# Patient Record
Sex: MALE | Race: BLACK OR AFRICAN AMERICAN | NOT HISPANIC OR LATINO | Employment: OTHER | URBAN - METROPOLITAN AREA
[De-identification: names, ages, dates, MRNs, and addresses within clinical notes are randomized per-mention and may not be internally consistent; named-entity substitution may affect disease eponyms.]

---

## 2017-05-10 ENCOUNTER — HOSPITAL ENCOUNTER (OUTPATIENT)
Dept: MRI IMAGING | Facility: CLINIC | Age: 67
Discharge: HOME | End: 2017-05-10
Attending: INTERNAL MEDICINE | Admitting: INTERNAL MEDICINE
Payer: COMMERCIAL

## 2017-05-10 DIAGNOSIS — G89.29 CHRONIC BACK PAIN: ICD-10-CM

## 2017-05-10 DIAGNOSIS — R29.898 WEAKNESS OF BOTH LEGS: ICD-10-CM

## 2017-05-10 DIAGNOSIS — N18.5 CHRONIC KIDNEY DISEASE, STAGE V (H): ICD-10-CM

## 2017-05-10 DIAGNOSIS — E11.22 TYPE 2 DIABETES MELLITUS WITH DIABETIC CHRONIC KIDNEY DISEASE (H): ICD-10-CM

## 2017-05-10 DIAGNOSIS — M54.9 CHRONIC BACK PAIN: ICD-10-CM

## 2017-05-10 DIAGNOSIS — I10 ESSENTIAL (PRIMARY) HYPERTENSION: ICD-10-CM

## 2017-05-10 PROCEDURE — 72148 MRI LUMBAR SPINE W/O DYE: CPT

## 2017-08-17 ENCOUNTER — MEDICAL CORRESPONDENCE (OUTPATIENT)
Dept: HEALTH INFORMATION MANAGEMENT | Facility: CLINIC | Age: 67
End: 2017-08-17

## 2017-08-17 ENCOUNTER — TRANSFERRED RECORDS (OUTPATIENT)
Dept: HEALTH INFORMATION MANAGEMENT | Facility: CLINIC | Age: 67
End: 2017-08-17

## 2017-11-03 ENCOUNTER — MEDICAL CORRESPONDENCE (OUTPATIENT)
Dept: HEALTH INFORMATION MANAGEMENT | Facility: CLINIC | Age: 67
End: 2017-11-03

## 2018-04-20 ENCOUNTER — HOSPITAL ENCOUNTER (OUTPATIENT)
Dept: ULTRASOUND IMAGING | Facility: CLINIC | Age: 68
Discharge: HOME | End: 2018-04-20
Attending: INTERNAL MEDICINE | Admitting: INTERNAL MEDICINE
Payer: COMMERCIAL

## 2018-04-20 DIAGNOSIS — N18.4 CHRONIC KIDNEY DISEASE, STAGE 4 (SEVERE) (H): ICD-10-CM

## 2018-04-20 PROCEDURE — 76770 US EXAM ABDO BACK WALL COMP: CPT

## 2019-01-07 ENCOUNTER — OFFICE VISIT (OUTPATIENT)
Dept: OPHTHALMOLOGY | Facility: CLINIC | Age: 69
End: 2019-01-07
Attending: OPTOMETRIST
Payer: COMMERCIAL

## 2019-01-07 DIAGNOSIS — H25.812 COMBINED FORMS OF AGE-RELATED CATARACT OF LEFT EYE: ICD-10-CM

## 2019-01-07 DIAGNOSIS — H40.003 GLAUCOMA SUSPECT, BILATERAL: ICD-10-CM

## 2019-01-07 DIAGNOSIS — E08.3213 MILD NONPROLIFERATIVE DIABETIC RETINOPATHY OF BOTH EYES WITH MACULAR EDEMA ASSOCIATED WITH DIABETES MELLITUS DUE TO UNDERLYING CONDITION (H): Primary | ICD-10-CM

## 2019-01-07 PROCEDURE — G0463 HOSPITAL OUTPT CLINIC VISIT: HCPCS | Mod: 25,ZF

## 2019-01-07 PROCEDURE — 92134 CPTRZ OPH DX IMG PST SGM RTA: CPT | Mod: ZF | Performed by: OPTOMETRIST

## 2019-01-07 ASSESSMENT — VISUAL ACUITY
OS_SC: 20/200
OS_PH_SC: 20/100
OD_PH_SC+: -2
METHOD: SNELLEN - LINEAR
OD_SC+: +2
OD_SC: 20/40
OS_PH_SC+: -1
OD_PH_SC: 20/30

## 2019-01-07 ASSESSMENT — CUP TO DISC RATIO
OS_RATIO: 0.6
OD_RATIO: 0.6

## 2019-01-07 ASSESSMENT — REFRACTION_MANIFEST
OD_AXIS: 020
OS_ADD: +3.00
OD_SPHERE: -0.50
OD_ADD: +3.00
OS_CYLINDER: +0.50
OS_SPHERE: -1.00
OS_AXIS: 180
OD_CYLINDER: +1.50

## 2019-01-07 ASSESSMENT — CONF VISUAL FIELD
OS_NORMAL: 1
METHOD: COUNTING FINGERS
OD_NORMAL: 1

## 2019-01-07 ASSESSMENT — SLIT LAMP EXAM - LIDS
COMMENTS: NORMAL
COMMENTS: NORMAL

## 2019-01-07 ASSESSMENT — EXTERNAL EXAM - LEFT EYE: OS_EXAM: NORMAL

## 2019-01-07 ASSESSMENT — TONOMETRY
IOP_METHOD: TONOPEN
OD_IOP_MMHG: 18
OS_IOP_MMHG: 17

## 2019-01-07 ASSESSMENT — REFRACTION_WEARINGRX: SPECS_TYPE: PAL

## 2019-01-07 ASSESSMENT — EXTERNAL EXAM - RIGHT EYE: OD_EXAM: NORMAL

## 2019-01-07 NOTE — PROGRESS NOTES
HPI:  Patient presents for a diabetic eye exam. He has diabetes mellitis type 2 (30 years) and on insulin (stable per last primary note 08/17/2017. Patient complains of glare and blur in the left eye. He has noticed the blur for the last three months now.       Pertinent Medical History:    Diabetes mellitis type 2    Hypertension    CKD 5    Ocular History:    PCO right eye    Cataract, left eye     No family history of glaucoma    Eye Medications:    None    Assessment and Plan:  1.   Mild Non-Proliferative Diabetic Retinopathy both eyes. With CME right eye >> left eye.     Patient has had diabetes for ~30 years. CME is visually significant. See retina clinic within one week for possible injections.     2.   Cataract, left eye.     Visually significant. Hold off until resolution of CME. Patient usually follows Dr. Vazquez.     3.   PCO, right eye.     Mildly visually significant. Follow up with Dr. Vazquez.     4.   Low Risk Glaucoma suspect due to age and cupping, both eyes.     Rim tissue full, large ONH, and no family history of glaucoma.    Recommends baseline testing to include VF 24-2, ONH OCT, and fundus photo in 6 months with Dr. Vazquez.       Medical History:  Past Medical History:   Diagnosis Date     Anal fissure      Kidney problem     causes leg swelling, underfunctioning     Non-proliferative diabetic retinopathy, both eyes (H) 9/16/2015     Nonsenile cataract      Nonspecific reaction to tuberculin skin test without active tuberculosis     finished 9 months INH 7/03-4/04     Type II or unspecified type diabetes mellitus without mention of complication, not stated as uncontrolled      Unspecified essential hypertension        Medications:  Current Outpatient Medications   Medication Sig Dispense Refill     amLODIPine (NORVASC) 5 MG tablet Take 5 mg by mouth daily.       ASPIRIN 81 MG OR TABS ONE DAILY 100 3     chlorthalidone (HYGROTEN) 25 MG tablet Take 25 mg by mouth daily.       Cholecalciferol  (VITAMIN D) 2000 UNIT CAPS Take 1 capsule by mouth daily.       glimepiride (AMARYL) 2 MG tablet Take 2 mg by mouth 2 times daily.       linagliptin (TRADJENTA) 5 MG TABS tablet Take 5 mg by mouth daily.       liraglutide (VICTOZA) 18 MG/3ML SOLN Inject  Subcutaneous daily. 0.6mg sq daily once weekly, then 1.2 sq daily       losartan (COZAAR) 50 MG tablet Take 50 mg by mouth daily.       metoprolol (TOPROL-XL) 50 MG 24 hr tablet Take 50 mg by mouth daily. For 90 days     Complete documentation of historical and exam elements from today's encounter can be found in the full encounter summary report (not reduplicated in this progress note). I personally obtained the chief complaint(s) and history of present illness.  I confirmed and edited as necessary the review of systems, past medical/surgical history, family history, social history, and examination findings as documented by others; and I examined the patient myself. I personally reviewed the relevant tests, images, and reports as documented above. I formulated and edited as necessary the assessment and plan and discussed the findings and management plan with the patient and family. - Sarthak Angeles, right eye    Chief Complaint(s) and History of Present Illness(es)     Diabetic Eye Exam     Associated symptoms include blurred vision.  Diabetes characteristics include Type 2.              Comments        The  blurry vision is in his LE. Hes has a history of cataract surgery RE 2012 but not the left.  He also complains of eye dryness LE and pressure LE.     Last A1C was 7.0 IN dECEMBER  BS today was 180    Gonzalo Teran COT 8:43 AM January 7, 2019

## 2019-02-20 ENCOUNTER — OFFICE VISIT (OUTPATIENT)
Dept: OPHTHALMOLOGY | Facility: CLINIC | Age: 69
End: 2019-02-20
Attending: OPHTHALMOLOGY
Payer: COMMERCIAL

## 2019-02-20 DIAGNOSIS — E10.3213 BOTH EYES AFFECTED BY MILD NONPROLIFERATIVE DIABETIC RETINOPATHY WITH MACULAR EDEMA, ASSOCIATED WITH TYPE 1 DIABETES MELLITUS (H): Primary | ICD-10-CM

## 2019-02-20 DIAGNOSIS — H25.12 AGE-RELATED NUCLEAR CATARACT, LEFT: Primary | ICD-10-CM

## 2019-02-20 DIAGNOSIS — E11.3293 NON-PROLIFERATIVE DIABETIC RETINOPATHY, BOTH EYES (H): ICD-10-CM

## 2019-02-20 PROCEDURE — 76516 ECHO EXAM OF EYE: CPT | Mod: ZF | Performed by: OPHTHALMOLOGY

## 2019-02-20 PROCEDURE — G0463 HOSPITAL OUTPT CLINIC VISIT: HCPCS | Mod: ZF

## 2019-02-20 ASSESSMENT — VISUAL ACUITY
METHOD: SNELLEN - LINEAR
OS_PH_SC: 20/100
OS_PH_SC: 20/100-1
OS_SC: 20/125
METHOD: SNELLEN - LINEAR
OD_SC+: -1
OS_SC: 20/125
OD_SC: 20/25
OS_PH_SC+: -1
OD_SC: 20/25-1

## 2019-02-20 ASSESSMENT — REFRACTION_WEARINGRX
OS_CYLINDER: +0.50
OD_ADD: +3.00
OD_AXIS: 020
SPECS_TYPE: PAL
OS_ADD: +3.00
OD_ADD: +3.00
OD_AXIS: 020
SPECS_TYPE: PAL
OD_CYLINDER: +1.50
OS_AXIS: 180
OD_SPHERE: -0.50
OS_SPHERE: -1.00
OS_AXIS: 180
OD_CYLINDER: +1.50
SPECS_TYPE: PAL
OS_ADD: +3.00
OS_SPHERE: -1.00
OS_CYLINDER: +0.50
OD_SPHERE: -0.50

## 2019-02-20 ASSESSMENT — SLIT LAMP EXAM - LIDS
COMMENTS: NORMAL

## 2019-02-20 ASSESSMENT — TONOMETRY
OS_IOP_MMHG: 22
OS_IOP_MMHG: 22
OD_IOP_MMHG: 18
IOP_METHOD: TONOPEN
IOP_METHOD: TONOPEN
OD_IOP_MMHG: 18

## 2019-02-20 ASSESSMENT — CONF VISUAL FIELD
METHOD: COUNTING FINGERS
OS_NORMAL: 1
OD_NORMAL: 1
OS_NORMAL: 1
OD_NORMAL: 1

## 2019-02-20 ASSESSMENT — EXTERNAL EXAM - LEFT EYE
OS_EXAM: NORMAL
OS_EXAM: NORMAL

## 2019-02-20 ASSESSMENT — EXTERNAL EXAM - RIGHT EYE
OD_EXAM: NORMAL
OD_EXAM: NORMAL

## 2019-02-20 ASSESSMENT — CUP TO DISC RATIO
OD_RATIO: 0.4
OS_RATIO: 0.4
OS_RATIO: 0.4
OD_RATIO: 0.4

## 2019-02-20 NOTE — PROGRESS NOTES
BERNABE Fierro is a 68 year old male referred by Dr. Solano and Dr. Alonso for cataract evaluation left eye. He has noted a slow decrease in his left eye vision. It is not very blurred on that side, and he is severely bothered by glare and bright sunlight. No pain, redness, discharge. No flashes/floaters.    POH: Cataract surgery in 2012 OD  PMH: Diabetes, type 2  FH: No FH of glc  SH: Non-smoker    Assessment & Plan    (H25.12) Age-related nuclear cataract, left  (primary encounter diagnosis)  Comment: Visually significant with BCVA of 20/100 and dense NS, cortical, and PSC changes on exam.    Dilates to: 7 mm  Alpha blockers/Flomax: None  Trauma/Pseudoxfoliation: None  Fuchs dystrophy/guttae: None    Diabetes: YES, with CME  Anticoagulation: None    Cyl: 1.74 @ 005 OS    We discussed the risks and benefits of cataract surgery in the left eye, and informed consent was obtained.  Proceed with CE/IOL OS.    Surgical plan:  RB/MAC  Post-op acular  Intraop avastin injection    (V43.1) Pseudophakia, right eye  Comment: Surgery performed elsewhere > 8 years ago. 3 piece IOL in the capsular bag. Mild PCO (s/p YAG in 2015)   Plan: Observe    (E11.5103) Non-proliferative diabetic retinopathy, both eyes (H)  Comment: With center-involving DME. Seen in retina clinic today. He declined anti-VEGF given good vision right eye and is afraid of complications.  Plan: Plan for deferral of anti-VEGF right eye until after CE/IOL left eye, plan for intra-op anti-VEGF left eye.     -----------------------------------------------------------------------------------    Patient disposition:   Return for scheduled procedure or sooner as needed.    Teaching statement:  I have confirmed the content of the chief complaint, history of present illness, review of systems, and past medical/surgical history sections and edited the information as needed.    I have interviewed and examined the patient and confirm the pertinent findings.   I have discussed the case with the resident/fellow and agree with the findings and plan as documented.    Kendal Vazquez MD  Comprehensive Ophthalmology & Ocular Pathology  Department of Ophthalmology and Visual Neurosciences  [email or web address]  Pager [phone]

## 2019-02-20 NOTE — PROGRESS NOTES
I have confirmed the patient's and reviewed Past Medical History, Past Surgical History, Social History, Family History, Problem List, Medication List and agree with Tech note.    CC: DM retinopathy    HPI: history of NPDR both eyes , lost to follow up , he ahd CE/IOL with Dr. Vazquez in 2015. Now worsening of vision in both eye    Assessment/plan:   1.  NPDR both eyes   Right eye    - center involving DME   - offered treatment with Anti-VEGF, he is declining to have treatment given that he has good vision right eye and is afraid of complications   - will plan for deferral of anti-VEGF until after CE/IOL left eye per patient request.    Left eye   - parafoveal CME   - closely observe for now   - best plan is to have pre-op avastin but pt likes to have CE/IOL first   - consider intraop avastin per Dr. Vazquez      2. PCIOL right eye    - few years ago byDr. Vazquez    - good outcome   - moderate PCO, to see Dr. Vazquez    3- Cataract left eye    - visually significant , motivated to have surgery    - will refer back to [place]   - ideally it is better to have pre-op avastin but pt is unwilling, consider intra-op avastin per Dr. Vazquez      RTC 2 months with dilated fundus exam , OCT mac     Cash Alonso MD, PhD  Vitreoretinal Surgery Fellow      Attestation:  I have seen and examined the patient with Dr. Alonso and agree with the findings in this note, as well as the interpretations of the diagnostic tests.      Sarah Ledezma MD PhD.  Professor & Chair

## 2019-02-21 ENCOUNTER — DOCUMENTATION ONLY (OUTPATIENT)
Dept: CARE COORDINATION | Facility: CLINIC | Age: 69
End: 2019-02-21

## 2019-02-27 DIAGNOSIS — H25.12 NUCLEAR SCLEROTIC CATARACT, LEFT: Primary | ICD-10-CM

## 2019-02-27 RX ORDER — KETOROLAC TROMETHAMINE 5 MG/ML
SOLUTION OPHTHALMIC
Qty: 1 BOTTLE | Refills: 1 | Status: SHIPPED | OUTPATIENT
Start: 2019-02-27 | End: 2019-03-27

## 2019-02-27 RX ORDER — OFLOXACIN 3 MG/ML
SOLUTION/ DROPS OPHTHALMIC
Qty: 1 BOTTLE | Refills: 0 | Status: SHIPPED | OUTPATIENT
Start: 2019-02-27 | End: 2019-03-27

## 2019-02-27 RX ORDER — PREDNISOLONE ACETATE 10 MG/ML
SUSPENSION/ DROPS OPHTHALMIC
Qty: 1 BOTTLE | Refills: 1 | Status: SHIPPED | OUTPATIENT
Start: 2019-02-27 | End: 2019-03-27

## 2019-02-28 ENCOUNTER — ANESTHESIA EVENT (OUTPATIENT)
Dept: SURGERY | Facility: AMBULATORY SURGERY CENTER | Age: 69
End: 2019-02-28

## 2019-03-01 ENCOUNTER — HOSPITAL ENCOUNTER (OUTPATIENT)
Facility: AMBULATORY SURGERY CENTER | Age: 69
End: 2019-03-01
Attending: OPHTHALMOLOGY
Payer: COMMERCIAL

## 2019-03-01 ENCOUNTER — ANESTHESIA (OUTPATIENT)
Dept: SURGERY | Facility: AMBULATORY SURGERY CENTER | Age: 69
End: 2019-03-01

## 2019-03-01 ENCOUNTER — OFFICE VISIT (OUTPATIENT)
Dept: OPHTHALMOLOGY | Facility: CLINIC | Age: 69
End: 2019-03-01
Payer: COMMERCIAL

## 2019-03-01 VITALS
SYSTOLIC BLOOD PRESSURE: 163 MMHG | BODY MASS INDEX: 32.58 KG/M2 | OXYGEN SATURATION: 100 % | TEMPERATURE: 97.5 F | WEIGHT: 220 LBS | RESPIRATION RATE: 15 BRPM | HEIGHT: 69 IN | HEART RATE: 64 BPM | DIASTOLIC BLOOD PRESSURE: 85 MMHG

## 2019-03-01 DIAGNOSIS — Z96.1 PSEUDOPHAKIA, LEFT EYE: Primary | ICD-10-CM

## 2019-03-01 DIAGNOSIS — E08.3213 MILD NONPROLIFERATIVE DIABETIC RETINOPATHY OF BOTH EYES WITH MACULAR EDEMA ASSOCIATED WITH DIABETES MELLITUS DUE TO UNDERLYING CONDITION (H): ICD-10-CM

## 2019-03-01 DIAGNOSIS — H25.12 AGE-RELATED NUCLEAR CATARACT OF LEFT EYE: Primary | ICD-10-CM

## 2019-03-01 DIAGNOSIS — E11.3293 NON-PROLIFERATIVE DIABETIC RETINOPATHY, BOTH EYES (H): ICD-10-CM

## 2019-03-01 DIAGNOSIS — H35.352 CYSTOID MACULAR DEGENERATION OF LEFT EYE: ICD-10-CM

## 2019-03-01 LAB — GLUCOSE BLDC GLUCOMTR-MCNC: 128 MG/DL (ref 70–99)

## 2019-03-01 DEVICE — EYE IMP IOL AMO PCL TECNIS ZCB00 23.0: Type: IMPLANTABLE DEVICE | Site: EYE | Status: FUNCTIONAL

## 2019-03-01 RX ORDER — ONDANSETRON 2 MG/ML
4 INJECTION INTRAMUSCULAR; INTRAVENOUS EVERY 30 MIN PRN
Status: DISCONTINUED | OUTPATIENT
Start: 2019-03-01 | End: 2019-03-02 | Stop reason: HOSPADM

## 2019-03-01 RX ORDER — TETRACAINE HYDROCHLORIDE 5 MG/ML
SOLUTION OPHTHALMIC PRN
Status: DISCONTINUED | OUTPATIENT
Start: 2019-03-01 | End: 2019-03-01 | Stop reason: HOSPADM

## 2019-03-01 RX ORDER — NALOXONE HYDROCHLORIDE 0.4 MG/ML
.1-.4 INJECTION, SOLUTION INTRAMUSCULAR; INTRAVENOUS; SUBCUTANEOUS
Status: DISCONTINUED | OUTPATIENT
Start: 2019-03-01 | End: 2019-03-02 | Stop reason: HOSPADM

## 2019-03-01 RX ORDER — SODIUM CHLORIDE, SODIUM LACTATE, POTASSIUM CHLORIDE, CALCIUM CHLORIDE 600; 310; 30; 20 MG/100ML; MG/100ML; MG/100ML; MG/100ML
500 INJECTION, SOLUTION INTRAVENOUS CONTINUOUS
Status: DISCONTINUED | OUTPATIENT
Start: 2019-03-01 | End: 2019-03-01 | Stop reason: HOSPADM

## 2019-03-01 RX ORDER — FENTANYL CITRATE 50 UG/ML
25-50 INJECTION, SOLUTION INTRAMUSCULAR; INTRAVENOUS
Status: DISCONTINUED | OUTPATIENT
Start: 2019-03-01 | End: 2019-03-01 | Stop reason: HOSPADM

## 2019-03-01 RX ORDER — CYCLOPENTOLATE HYDROCHLORIDE 10 MG/ML
1 SOLUTION/ DROPS OPHTHALMIC
Status: COMPLETED | OUTPATIENT
Start: 2019-03-01 | End: 2019-03-01

## 2019-03-01 RX ORDER — ONDANSETRON 4 MG/1
4 TABLET, ORALLY DISINTEGRATING ORAL EVERY 30 MIN PRN
Status: DISCONTINUED | OUTPATIENT
Start: 2019-03-01 | End: 2019-03-02 | Stop reason: HOSPADM

## 2019-03-01 RX ORDER — BALANCED SALT SOLUTION 6.4; .75; .48; .3; 3.9; 1.7 MG/ML; MG/ML; MG/ML; MG/ML; MG/ML; MG/ML
SOLUTION OPHTHALMIC PRN
Status: DISCONTINUED | OUTPATIENT
Start: 2019-03-01 | End: 2019-03-01 | Stop reason: HOSPADM

## 2019-03-01 RX ORDER — SODIUM CHLORIDE, SODIUM LACTATE, POTASSIUM CHLORIDE, CALCIUM CHLORIDE 600; 310; 30; 20 MG/100ML; MG/100ML; MG/100ML; MG/100ML
INJECTION, SOLUTION INTRAVENOUS CONTINUOUS
Status: DISCONTINUED | OUTPATIENT
Start: 2019-03-01 | End: 2019-03-02 | Stop reason: HOSPADM

## 2019-03-01 RX ORDER — PHENYLEPHRINE HYDROCHLORIDE 25 MG/ML
1 SOLUTION/ DROPS OPHTHALMIC
Status: COMPLETED | OUTPATIENT
Start: 2019-03-01 | End: 2019-03-01

## 2019-03-01 RX ORDER — PROPOFOL 10 MG/ML
INJECTION, EMULSION INTRAVENOUS PRN
Status: DISCONTINUED | OUTPATIENT
Start: 2019-03-01 | End: 2019-03-01

## 2019-03-01 RX ORDER — FLURBIPROFEN SODIUM 0.3 MG/ML
1 SOLUTION/ DROPS OPHTHALMIC
Status: COMPLETED | OUTPATIENT
Start: 2019-03-01 | End: 2019-03-01

## 2019-03-01 RX ORDER — OFLOXACIN 3 MG/ML
1 SOLUTION/ DROPS OPHTHALMIC
Status: COMPLETED | OUTPATIENT
Start: 2019-03-01 | End: 2019-03-01

## 2019-03-01 RX ORDER — MEPERIDINE HYDROCHLORIDE 25 MG/ML
12.5 INJECTION INTRAMUSCULAR; INTRAVENOUS; SUBCUTANEOUS
Status: DISCONTINUED | OUTPATIENT
Start: 2019-03-01 | End: 2019-03-02 | Stop reason: HOSPADM

## 2019-03-01 RX ORDER — OXYCODONE HYDROCHLORIDE 5 MG/1
5 TABLET ORAL EVERY 4 HOURS PRN
Status: DISCONTINUED | OUTPATIENT
Start: 2019-03-01 | End: 2019-03-02 | Stop reason: HOSPADM

## 2019-03-01 RX ORDER — PROPARACAINE HYDROCHLORIDE 5 MG/ML
1 SOLUTION/ DROPS OPHTHALMIC ONCE
Status: COMPLETED | OUTPATIENT
Start: 2019-03-01 | End: 2019-03-01

## 2019-03-01 RX ORDER — ACETAMINOPHEN 325 MG/1
650 TABLET ORAL ONCE
Status: COMPLETED | OUTPATIENT
Start: 2019-03-01 | End: 2019-03-01

## 2019-03-01 RX ADMIN — OFLOXACIN 1 DROP: 3 SOLUTION/ DROPS OPHTHALMIC at 10:52

## 2019-03-01 RX ADMIN — CYCLOPENTOLATE HYDROCHLORIDE 1 DROP: 10 SOLUTION/ DROPS OPHTHALMIC at 10:42

## 2019-03-01 RX ADMIN — OFLOXACIN 1 DROP: 3 SOLUTION/ DROPS OPHTHALMIC at 10:57

## 2019-03-01 RX ADMIN — PHENYLEPHRINE HYDROCHLORIDE 1 DROP: 25 SOLUTION/ DROPS OPHTHALMIC at 10:57

## 2019-03-01 RX ADMIN — FLURBIPROFEN SODIUM 1 DROP: 0.3 SOLUTION/ DROPS OPHTHALMIC at 10:42

## 2019-03-01 RX ADMIN — PHENYLEPHRINE HYDROCHLORIDE 1 DROP: 25 SOLUTION/ DROPS OPHTHALMIC at 10:42

## 2019-03-01 RX ADMIN — OFLOXACIN 1 DROP: 3 SOLUTION/ DROPS OPHTHALMIC at 10:42

## 2019-03-01 RX ADMIN — CYCLOPENTOLATE HYDROCHLORIDE 1 DROP: 10 SOLUTION/ DROPS OPHTHALMIC at 10:57

## 2019-03-01 RX ADMIN — FLURBIPROFEN SODIUM 1 DROP: 0.3 SOLUTION/ DROPS OPHTHALMIC at 10:52

## 2019-03-01 RX ADMIN — ACETAMINOPHEN 650 MG: 325 TABLET ORAL at 10:36

## 2019-03-01 RX ADMIN — FLURBIPROFEN SODIUM 1 DROP: 0.3 SOLUTION/ DROPS OPHTHALMIC at 11:03

## 2019-03-01 RX ADMIN — SODIUM CHLORIDE, SODIUM LACTATE, POTASSIUM CHLORIDE, CALCIUM CHLORIDE 500 ML: 600; 310; 30; 20 INJECTION, SOLUTION INTRAVENOUS at 10:36

## 2019-03-01 RX ADMIN — PROPARACAINE HYDROCHLORIDE 1 DROP: 5 SOLUTION/ DROPS OPHTHALMIC at 10:37

## 2019-03-01 RX ADMIN — FLURBIPROFEN SODIUM 1 DROP: 0.3 SOLUTION/ DROPS OPHTHALMIC at 10:57

## 2019-03-01 RX ADMIN — CYCLOPENTOLATE HYDROCHLORIDE 1 DROP: 10 SOLUTION/ DROPS OPHTHALMIC at 10:52

## 2019-03-01 RX ADMIN — PHENYLEPHRINE HYDROCHLORIDE 1 DROP: 25 SOLUTION/ DROPS OPHTHALMIC at 10:52

## 2019-03-01 RX ADMIN — PROPOFOL 40 MG: 10 INJECTION, EMULSION INTRAVENOUS at 11:17

## 2019-03-01 ASSESSMENT — VISUAL ACUITY
OS_SC: CF @ 2'
METHOD: SNELLEN - LINEAR

## 2019-03-01 ASSESSMENT — TONOMETRY
IOP_METHOD: TONOPEN
OS_IOP_MMHG: 20

## 2019-03-01 ASSESSMENT — SLIT LAMP EXAM - LIDS: COMMENTS: PTOSIS

## 2019-03-01 ASSESSMENT — MIFFLIN-ST. JEOR: SCORE: 1740.41

## 2019-03-01 ASSESSMENT — EXTERNAL EXAM - LEFT EYE: OS_EXAM: NORMAL

## 2019-03-01 NOTE — NURSING NOTE
Chief Complaints and History of Present Illnesses   Patient presents with     Post Op (Ophthalmology) Left Eye     Left Eye Cataract Removal with Intraocular Lens Implant     Chief Complaint(s) and History of Present Illness(es)     Post Op (Ophthalmology) Left Eye     Laterality: left eye    Onset: sudden    Onset: hours ago    Quality: distorted vision    Severity: moderate    Frequency: constantly    Timing: throughout the day    Course: stable    Associated symptoms: Negative for eye pain    Treatments tried: eye drops    Pain scale: 0/10    Comments: Left Eye Cataract Removal with Intraocular Lens Implant              Comments     No eye pain today. Vision is blurry. Patient states they have their post op drops from the pharmacy.   Ariana Dawkins COT 12:28 PM March 1, 2019

## 2019-03-01 NOTE — ANESTHESIA PREPROCEDURE EVALUATION
"Anesthesia Pre-Procedure Evaluation    Patient: Jass Fierro   MRN:     [id number] Gender:   male   Age:    68 year old :      1950        Preoperative Diagnosis: Visually Significant Cataract   Procedure(s):  Left Eye Cataract Removal with Intraocular Lens Implant     Past Medical History:   Diagnosis Date     Anal fissure      Kidney problem     causes leg swelling, underfunctioning     Non-proliferative diabetic retinopathy, both eyes (H) 2015     Nonsenile cataract      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     finished 9 months INH -     Type II or unspecified type diabetes mellitus without mention of complication, not stated as uncontrolled      Unspecified essential hypertension       Past Surgical History:   Procedure Laterality Date     C NONSPECIFIC PROCEDURE  2002    Fistulotomy + partial lat. internal sphincterotomy     CATARACT IOL, RT/LT Right     doctor and clinic unknown to pt          Anesthesia Evaluation     .             ROS/MED HX    ENT/Pulmonary:  - neg pulmonary ROS     Neurologic:  - neg neurologic ROS     Cardiovascular:     (+) hypertension----. : . . . :. .       METS/Exercise Tolerance:     Hematologic:  - neg hematologic  ROS       Musculoskeletal:  - neg musculoskeletal ROS       GI/Hepatic:  - neg GI/hepatic ROS       Renal/Genitourinary: Comment: No recent labs. Patient states that last time labs were checked, everything was \"normal\". No dialysis. Makes urine.     (+) chronic renal disease, type: CRI, Pt does not require dialysis, Pt has no history of transplant,       Endo:     (+) type II DM Obesity, .      Psychiatric:  - neg psychiatric ROS       Infectious Disease:  - neg infectious disease ROS       Malignancy:         Other:                         PHYSICAL EXAM:   Mental Status/Neuro:    Airway: Facies: Feasible  Mallampati: II  Mouth/Opening: Full  TM distance: > 6 cm  Neck ROM: Full   Respiratory:    CV:    Comments:                    Lab Results " "  Component Value Date    WBC 7.5 05/07/2012    HGB 12.5 (L) 05/07/2012    HCT 37.1 (L) 05/07/2012     05/07/2012     05/07/2012    POTASSIUM 5.2 05/07/2012    CHLORIDE 105 05/07/2012    CO2 23 05/07/2012    BUN 49 (H) 05/07/2012    CR 3.74 (H) 05/07/2012     (H) 05/07/2012    BOB 9.2 05/07/2012    PHOS 4.2 08/12/2011    ALBUMIN 4.3 05/07/2012    PROTTOTAL 8.6 05/07/2012    ALT 23 05/07/2012    AST 31 05/07/2012    ALKPHOS 125 05/07/2012    BILITOTAL 0.6 05/07/2012    TSH 4.12 01/13/2006       Preop Vitals  BP Readings from Last 3 Encounters:   03/01/19 171/82   05/07/12 165/94   08/11/08 138/82    Pulse Readings from Last 3 Encounters:   03/01/19 64   05/07/12 69   08/11/08 56      Resp Readings from Last 3 Encounters:   03/01/19 18   05/07/12 18   08/11/08 12    SpO2 Readings from Last 3 Encounters:   03/01/19 100%   05/07/12 100%      Temp Readings from Last 1 Encounters:   03/01/19 36.8  C (98.2  F) (Oral)    Ht Readings from Last 1 Encounters:   03/01/19 1.74 m (5' 8.5\")      Wt Readings from Last 1 Encounters:   03/01/19 99.8 kg (220 lb)    Estimated body mass index is 32.96 kg/m  as calculated from the following:    Height as of this encounter: 1.74 m (5' 8.5\").    Weight as of this encounter: 99.8 kg (220 lb).     LDA:            Assessment:   ASA SCORE: 3    NPO Status: > 2 hours since completed Clear Liquids; > 6 hours since completed Solid Foods   Documentation: H&P complete; Preop Testing complete; Consents complete   Proceeding: Proceed without further delay  Tobacco Use:  NO Active use of Tobacco/UNKNOWN Tobacco use status     Plan:   Anes. Type:  MAC   Pre-Induction: Midazolam IV   Induction:  Not applicable   Airway: Native Airway   Access/Monitoring: PIV   Maintenance: N/a   Emergence: N/a   Logistics: Same Day Surgery     Postop Pain/Sedation Strategy:  Standard-Options: Opioids PRN     PONV Management:  Adult Risk Factors:, Non-Smoker, Postop Opioids     CONSENT: Direct " conversation   Plan and risks discussed with: Patient          Comments for Plan/Consent:  Risks, benefits, and alternatives of MAC discussed with patient. They understand the risks including possible recall of events in the room. They understand there is a possibility that conversion to general anesthesia may be needed. Patient consents.                           Felicia Bettencourt MD

## 2019-03-01 NOTE — DISCHARGE INSTRUCTIONS
[facility]     Home Care Following Cataract Surgery    If you have a gauze eye patch on, please do not remove it until it is removed by your doctor at your first appointment after your surgery.  You will start your eye drops the next day.    OR    If you only have a clear eye shield on, you may remove the eye shield when you get home and begin eye drops today as directed by your doctor.      Wear the clear eye shield for protection when sleeping for the next 5 days.      Do not rub the eye that had the operation.      Your eye might be sensitive to light.  Wearing sunglasses may be more comfortable for you.      You may have some discomfort and irritation.  Acetaminophen (Tylenol) or Ibuprofen (Advil) may be taken for discomfort. If pain persists please call your doctor s office.      Keep the eye that had the surgery dry. You may wash your hair, bathe or shower, but keep your eye closed while doing so.       Avoid bending over, strenuous activity or heavy lifting until this activity has been approved by your doctor.      You have a follow-up appointment with your doctor tomorrow at the [facility] ([phone]).  Bring all your prescribed eye drops with you to this follow-up appointment.        If you take glaucoma medications, bring them with you to your follow-up appointment tomorrow.      Use medication exactly as prescribed by your doctor. You may restart your regular home medications.       Occasional blood-tinged tears are normal the day or two after surgery. However, if there is large or persistent bleeding from the eye, that is not normal, and you should contact the clinic.      Call your doctor s office at [phone] if any of the following should occur:    - Any sudden vision changes, including decreased vision  - Nausea or severe headache  - Increase in pain that is not controlled with Acetaminophen (Tylenol) or Ibuprofen (Advil)  - Signs of  infection (pus, increasing redness or tenderness)  - Severe sensitivity to light  - An increase in floaters (black spots in front of your vision)    [facility]  Home Care Following Anesthesia  For 24 hours after surgery:  1. Get plenty of rest.  A responsible adult must stay with you for at least 24 hours after you leave the surgery center.  2. Do not drive or use heavy equipment.  If you have weakness or tingling, don't drive or use heavy equipment until this feeling goes away.   3. Do not drink alcohol.   4. Avoid strenuous or risky activities.  Ask for help when climbing stairs.  5. You may feel lightheaded.  IF so, sit for a few minutes before standing.  Have someone help you get up.   6. If you have nausea (feel sick to your stomach): Drink only clear liquids such as apple juice, ginger ale, broth or 7-Up.  Rest may also help.  Be sure to drink enough fluids.  Move to a regular diet as you feel able.   7. You may have a slight fever.  Call the doctor if your fever is over 100 F (37.7 C) (taken under the tongue) or lasts longer than 24 hours.  8. You may have a dry mouth, a sore throat, muscle aches or trouble sleeping. These should go away after 24 hours.  9. Do not make important or legal decisions.               Tips for taking pain medications  To get the best pain relief possible, remember these points:    Take pain medications as directed, before pain becomes severe.    Pain medication can upset your stomach: taking it with food may help.    Constipation is a common side effect of pain medication. Drink plenty of  fluids.    Eat foods high in fiber. Take a stool softener if recommended by your doctor or pharmacist.    Do not drink alcohol, drive or operate machinery while taking pain medications.    Ask about other ways to control pain, such as with heat, ice or relaxation.    Tylenol/Acetaminophen Consumption  To help encourage the safe use of acetaminophen, the  makers of TYLENOL  have lowered the maximum daily dose for single-ingredient Extra Strength TYLENOL  (acetaminophen) products sold in the [place] from 8 pills per day (4,000 mg) to 6 pills per day (3,000 mg). The dosing interval has also changed from 2 pills every 4-6 hours to 2 pills every 6 hours.    If you feel your pain relief is insufficient, you may take Tylenol/Acetaminophen in addition to your narcotic pain medication.     Be careful not to exceed 3,000 mg of Tylenol/Acetaminophen in a 24 hour period from all sources.    If you are taking extra strength Tylenol/acetaminophen (500 mg), the maximum dose is 6 tablets in 24 hours.    If you are taking regular strength acetaminophen (325 mg), the maximum dose is 9 tablets in 24 hours.    Call a doctor for any of the followin. Signs of infection (fever, growing tenderness at the surgery site, a large amount of drainage or bleeding, severe pain, foul-smelling drainage, redness, swelling).  2. It has been over 8 to 10 hours since surgery and you are still not able to urinate (pass water).  3. Headache for over 24 hours.  Your doctor is: Dr. Kendal Vazquez, Ophthalmology: [phone]               Or dial [phone] and ask for the resident on call for:  Ophthalmology  For emergency care, call the:  [place] [facility]:  [phone] (TTY for hearing impaired: [phone])

## 2019-03-01 NOTE — ANESTHESIA PREPROCEDURE EVALUATION
Anesthesia Pre-Procedure Evaluation    Patient: Jass Fierro   MRN:     [id number] Gender:   male   Age:    68 year old :      1950        Preoperative Diagnosis: Visually Significant Cataract   Procedure(s):  Left Eye Cataract Removal with Intraocular Lens Implant     Past Medical History:   Diagnosis Date     Anal fissure      Kidney problem     causes leg swelling, underfunctioning     Non-proliferative diabetic retinopathy, both eyes (H) 2015     Nonsenile cataract      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     finished 9 months INH -     Type II or unspecified type diabetes mellitus without mention of complication, not stated as uncontrolled      Unspecified essential hypertension       Past Surgical History:   Procedure Laterality Date     C NONSPECIFIC PROCEDURE  2002    Fistulotomy + partial lat. internal sphincterotomy     CATARACT IOL, RT/LT Right     doctor and clinic unknown to pt          Anesthesia Evaluation     .             ROS/MED HX    ENT/Pulmonary:  - neg pulmonary ROS     Neurologic:  - neg neurologic ROS     Cardiovascular:     (+) hypertension----. : . . . :. .       METS/Exercise Tolerance:     Hematologic:  - neg hematologic  ROS       Musculoskeletal:  - neg musculoskeletal ROS       GI/Hepatic:  - neg GI/hepatic ROS       Renal/Genitourinary:  - ROS Renal section negative       Endo:     (+) type II DM Obesity, .      Psychiatric:  - neg psychiatric ROS       Infectious Disease:  - neg infectious disease ROS       Malignancy:         Other:                         PHYSICAL EXAM:   Mental Status/Neuro: A/A/O   Airway: Facies: Feasible  Mallampati: II  Mouth/Opening: Full  TM distance: > 6 cm  Neck ROM: Full   Respiratory:    CV:    Comments:                    Lab Results   Component Value Date    WBC 7.5 2012    HGB 12.5 (L) 2012    HCT 37.1 (L) 2012     2012     2012    POTASSIUM 5.2 2012    CHLORIDE 105 2012  "   CO2 23 05/07/2012    BUN 49 (H) 05/07/2012    CR 3.74 (H) 05/07/2012     (H) 05/07/2012    BOB 9.2 05/07/2012    PHOS 4.2 08/12/2011    ALBUMIN 4.3 05/07/2012    PROTTOTAL 8.6 05/07/2012    ALT 23 05/07/2012    AST 31 05/07/2012    ALKPHOS 125 05/07/2012    BILITOTAL 0.6 05/07/2012    TSH 4.12 01/13/2006       Preop Vitals  BP Readings from Last 3 Encounters:   03/01/19 171/82   05/07/12 165/94   08/11/08 138/82    Pulse Readings from Last 3 Encounters:   03/01/19 64   05/07/12 69   08/11/08 56      Resp Readings from Last 3 Encounters:   03/01/19 18   05/07/12 18   08/11/08 12    SpO2 Readings from Last 3 Encounters:   03/01/19 100%   05/07/12 100%      Temp Readings from Last 1 Encounters:   03/01/19 36.8  C (98.2  F) (Oral)    Ht Readings from Last 1 Encounters:   03/01/19 1.74 m (5' 8.5\")      Wt Readings from Last 1 Encounters:   03/01/19 99.8 kg (220 lb)    Estimated body mass index is 32.96 kg/m  as calculated from the following:    Height as of this encounter: 1.74 m (5' 8.5\").    Weight as of this encounter: 99.8 kg (220 lb).     LDA:            Assessment:   ASA SCORE: 2    NPO Status: > 2 hours since completed Clear Liquids; > 6 hours since completed Solid Foods   Documentation: H&P complete; Preop Testing complete; Consents complete   Proceeding: Proceed without further delay  Tobacco Use:  NO Active use of Tobacco/UNKNOWN Tobacco use status     Plan:   Anes. Type:  MAC   Pre-Induction: Midazolam IV   Induction:  Not applicable   Airway: Native Airway   Access/Monitoring: PIV   Maintenance: N/a   Emergence: N/a   Logistics: Same Day Surgery     Postop Pain/Sedation Strategy:  Standard-Options: Opioids PRN     PONV Management:  Adult Risk Factors:, Non-Smoker, Postop Opioids     CONSENT: Direct conversation   Plan and risks discussed with: Patient          Comments for Plan/Consent:  Risks, benefits, and alternatives of MAC discussed with patient. They understand the risks including possible " recall of events in the room. They understand there is a possibility that conversion to general anesthesia may be needed. Patient consents.                           Felicia Bettencourt MD

## 2019-03-01 NOTE — ANESTHESIA CARE TRANSFER NOTE
Patient: Jass Fierro    Procedure(s):  Left Eye Cataract Removal with Intraocular Lens Implant    Diagnosis: Visually Significant Cataract  Diagnosis Additional Information: No value filed.    Anesthesia Type:   No value filed.     Note:  Airway :Room Air  Patient transferred to:Phase II  Handoff Report: Identifed the Patient, Identified the Reponsible Provider, Reviewed the pertinent medical history, Discussed the surgical course, Reviewed Intra-OP anesthesia mangement and issues during anesthesia, Set expectations for post-procedure period and Allowed opportunity for questions and acknowledgement of understanding      Vitals: (Last set prior to Anesthesia Care Transfer)    CRNA VITALS  3/1/2019 1116 - 3/1/2019 1150      3/1/2019             NIBP:  140/101  (Abnormal)     NIBP Mean:  127    SpO2:  100 %    Resp Rate (set):  10                Electronically Signed By: DEVEN Raya CRNA  March 1, 2019  11:50 AM

## 2019-03-01 NOTE — ANESTHESIA POSTPROCEDURE EVALUATION
Anesthesia POST Procedure Evaluation    Patient: Jass Fierro   MRN:     [id number] Gender:   male   Age:    68 year old :      1950        Preoperative Diagnosis: Visually Significant Cataract   Procedure(s):  Left Eye Cataract Removal with Intraocular Lens Implant   Postop Comments: No value filed.       Anesthesia Type:  MAC    Reportable Event: NO     PAIN: Uncomplicated   Sign Out status: Comfortable, Well controlled pain     PONV: No PONV   Sign Out status:  No Nausea or Vomiting     Neuro/Psych: Uneventful perioperative course   Sign Out Status: Preoperative baseline; Age appropriate mentation     Airway/Resp.: Uneventful perioperative course   Sign Out Status: Non labored breathing, age appropriate RR; Resp. Status within EXPECTED Parameters     CV: Uneventful perioperative course   Sign Out status: Appropriate BP and perfusion indices; Appropriate HR/Rhythm     Disposition:   Sign Out in:  Phase II  Disposition:  Home  Recovery Course: Uneventful  Follow-Up: Not required           Last Anesthesia Record Vitals:  CRNA VITALS  3/1/2019 1116 - 3/1/2019 1216      3/1/2019             NIBP:  140/101  (Abnormal)     NIBP Mean:  127    SpO2:  100 %    Resp Rate (set):  10          Last PACU/Preop Vitals:  Vitals:    19 0959 19 1150 19 1205   BP: 171/82 134/80 163/85   Pulse: 64     Resp: 18 16 15   Temp: 36.8  C (98.2  F) 36.4  C (97.5  F) 36.4  C (97.5  F)   SpO2: 100% 96% 100%         Electronically Signed By: Felicia Bettencourt MD, 2019, 1:01 PM

## 2019-03-01 NOTE — PROGRESS NOTES
POD#0, status post cataract surgery, left eye, retrobulbar block, avastin injection    No complaints.  Denies eye pain.    Impression/Plan:  Pseudophakia, OS: POD0, good post-operative appearance. IOP reasonable.    - Fluoroquinolone antibiotic 4x daily in the surgical eye for 1 week  - Prednisolone 4x daily until follow-up  - Ketorolac to start 1 week after surgery, then use 4x daily until follow-up      Eye protection at all times and eye shield at night for 1 week.    Limited activities with no exercise or heavy lifting for 1 week.    Instructed patient to contact us for decreasing vision, eye pain, new floaters or flashes of light or other concerning symptoms.    Written instructions given    Return to clinic 3-4 weeks with refraction, dilation, and macula OCT both eyes    Teaching statement:  Complete documentation of historical and exam elements from today's encounter can be found in the full encounter summary report (not reduplicated in this progress note). I personally obtained the chief complaint(s) and history of present illness.  I confirmed and edited as necessary the review of systems, past medical/surgical history, family history, social history, and examination findings as documented by others; and I examined the patient myself. I personally reviewed the relevant tests, images, and reports as documented above.     I formulated and edited as necessary the assessment and plan and discussed the findings and management plan with the patient and family.    Kendal Vazquez MD  Comprehensive Ophthalmology & Ocular Pathology  Department of Ophthalmology and Visual Neurosciences  [email or web address]  Pager [phone]

## 2019-03-01 NOTE — PROCEDURES
Ophthalmology Post-op Procedure Note    Surgical Service:    Ophthalmology & Visual Sciences  Date Performed:      March 1, 2019  [facility]      Pre-operative Diagnosis: Visually significant cataract, left eye; diabetic macular edema let eye  Post-operative Diagnosis:  Pseudophakia, left eye; diabetic macular edema left eye  Operative Procedure:  Phacoemulsification with intraocular lens implantation, left eye; Intravitreal injection of bevacizumab, left eye    Surgeon(s):  Fellow/Staff Surgeon:       Kendal aVzquez MD  Resident Surgeon:            Yossi Figueroa MD    Anesthesia:   Retrobulbar/MAC  Findings:  Dense cataract  Blood Loss:    Minimal  Implants:  ZCB00 23.0 intraocular lens  Specimens:  None     Complications:  The patient did not experience any complications.   Condition: Stable    Operative Report Completion:    Description of Operation/Procedure:  After appropriate informed consent was obtained, the appropriate cardiac and blood pressure monitors were placed. The patient was brought to the operating room. A final pause occurred just before the start of the procedure during which the entire procedure team actively confirmed the correct patient, procedure, site, special equipment and special requirements. Under monitored anesthesia care, the patient was sedated with intravenous anesthesia. While the patient was sedated, retrobulbar anesthesia was achieved with 4 cc of 1% lidocaine, 0.375% bupivacaine and 75 units of Vitrase. An additional 1 cc of this anesthetic mixture was given around the orbicularis oculi muscle as the needle was being withdrawn from the muscle cone. The patient was prepped and draped in the usual sterile fashion using 5% povidone/iodine.     An eyelid speculum was placed to open the eyelids. A paracentesis port was placed approximately sixty degrees to the left of the planned temporal incision location using the sideport blade. The anterior chamber was filled with  dispersive viscoelastic. A clear corneal temporal incision was made with a metal 2.6 mm keratome. A round continuous tear capsulorhexis was initiated with a cystotome and completed with the Utrata forceps. Balanced salt solution on a cannula was used to perform hydrodissection. The nucleus was removed using phacoemulsification with a chop technique. The remaining cortical material was removed using the irrigation/aspiration handpiece. The capsular bag was filled with Provisc. A lens of the power and model listed above was placed into the capsular bag using the cartridge injection system. The remaining viscoelastic was removed using the irrigation aspiration handpiece. 0.1 mm of 1.25mg Intravitreal bevacizumab was injected 3.5 mm posterior to the limbus superotemporally. Intracameral moxifloxacin was administered. The paracentesis and temporal wounds were hydrated with balanced salt solution. At the conclusion of the case, the wounds were felt to be watertight, the pupil was round, the lens was centered, and the anterior chamber was deep. The eyelid speculum was removed. Antibiotic ointment was administered to the operative eye. A pressure patch was placed over the operative eye.      Attending Attestation:  I was present for and performed the entire procedure.  Kendal Vazquez MD

## 2019-03-01 NOTE — PROCEDURES
Ophthalmology Post-op Procedure Note    Surgical Service:    Ophthalmology & Visual Sciences  Date Performed:      March 1, 2019  Location: [facility]      Pre-operative Diagnosis: Visually significant cataract, right eye  Post-operative Diagnosis:  Pseudophakia, right eye  Operative Procedure:  Phacoemulsification with intraocular lens implantation, right eye      Surgeon(s):  Fellow/Staff Surgeon:       Kendal Vazquez MD  Resident Surgeon:            Amy Reed MD    Anesthesia:   Retrobulbar/MAC  Findings:  Dense cortical opacification with poor red reflex  Blood Loss:    Minimal  Implants:  ZCB00 21.0 intraocular lens  Specimens:  None     Complications:  The patient did not experience any complications.   Condition: Stable    Operative Report Completion:    Description of Operation/Procedure:    After appropriate informed consent was obtained, the appropriate cardiac and blood pressure monitors were placed. The patient was brought to the operating room. A final pause occurred just before the start of the procedure during which the entire procedure team actively confirmed the correct patient, procedure, site, special equipment and special requirements. Under monitored anesthesia care, the patient was sedated with intravenous anesthesia. While the patient was sedated, retrobulbar anesthesia was achieved with 4 cc of 1% lidocaine, 0.375% bupivacaine and 75 units of Vitrase. An additional 1 cc of this anesthetic mixture was given around the orbicularis oculi muscle as the needle was being withdrawn from the muscle cone. The patient was prepped and draped in the usual sterile fashion using 5% povidone/iodine.     An eyelid speculum was placed to open the eyelids. A paracentesis port was placed approximately sixty degrees to the left of the planned temporal incision location using the sideport blade. Approximately 0.8 cc of 1% nonpreserved lidocaine was placed into the anterior chamber. Air was injected in  the anterior chamber followed by Trypan blue. Trypan blue was necessary to provide better visualization of the anterior capsule. The anterior chamber was filled with dispersive viscoelastic. A clear corneal temporal incision was made with a metal 2.6 mm keratome. A round continuous tear capsulorhexis was initiated with a cystotome and completed with the Utrata forceps. Balanced salt solution on a cannula was used to perform hydrodissection. The nucleus was removed using phacoemulsification with a chop technique. The remaining cortical material was removed using the irrigation/aspiration handpiece. The capsular bag was filled with cohesive viscoelastic. A lens of the model and power listed above was placed into the capsular bag using the cartridge injection system. The remaining viscoelastic was removed using the irrigation aspiration handpiece. The paracentesis and temporal wounds were hydrated with balanced salt solution. Intracameral moxifloxacin was administered. At the conclusion of the case, the wounds were felt to be watertight, the pupil was round, the lens was centered, and the anterior chamber was deep. The eyelid speculum was removed. Antibiotic ointment was administered to the operative eye. A pressure patch was placed over the operative eye.        Attending Attestation:  I was present for and performed the entire procedure.  Kendal Vazquez MD

## 2019-03-27 ENCOUNTER — OFFICE VISIT (OUTPATIENT)
Dept: OPHTHALMOLOGY | Facility: CLINIC | Age: 69
End: 2019-03-27
Attending: OPHTHALMOLOGY
Payer: COMMERCIAL

## 2019-03-27 DIAGNOSIS — Z96.1 PSEUDOPHAKIA, BOTH EYES: ICD-10-CM

## 2019-03-27 PROCEDURE — 92015 DETERMINE REFRACTIVE STATE: CPT | Mod: ZF

## 2019-03-27 PROCEDURE — G0463 HOSPITAL OUTPT CLINIC VISIT: HCPCS | Mod: ZF

## 2019-03-27 RX ORDER — KETOROLAC TROMETHAMINE 5 MG/ML
1 SOLUTION OPHTHALMIC 4 TIMES DAILY
Qty: 1 BOTTLE | Refills: 3 | Status: SHIPPED | OUTPATIENT
Start: 2019-03-27 | End: 2023-07-06

## 2019-03-27 RX ORDER — PREDNISOLONE ACETATE 10 MG/ML
1 SUSPENSION/ DROPS OPHTHALMIC 4 TIMES DAILY
Qty: 1 BOTTLE | Refills: 1 | Status: SHIPPED | OUTPATIENT
Start: 2019-03-27 | End: 2023-07-06

## 2019-03-27 ASSESSMENT — REFRACTION_MANIFEST
OD_CYLINDER: +1.50
OD_SPHERE: -0.75
OD_AXIS: 017
OS_CYLINDER: +0.75
OS_SPHERE: -1.25
OS_AXIS: 177
OS_ADD: +2.75
OD_ADD: +2.75

## 2019-03-27 ASSESSMENT — VISUAL ACUITY
OS_PH_SC: 20/60
OD_PH_SC+: -2
OD_SC+: +1
OS_SC: J7
OD_PH_SC: 20/40
OS_SC+: +1
OS_SC: 20/100-
OD_SC: 20/50
OD_SC: J7
OS_PH_SC+: -2
METHOD: SNELLEN - LINEAR

## 2019-03-27 ASSESSMENT — SLIT LAMP EXAM - LIDS
COMMENTS: NORMAL
COMMENTS: NORMAL

## 2019-03-27 ASSESSMENT — CONF VISUAL FIELD
OD_NORMAL: 1
OS_NORMAL: 1
METHOD: COUNTING FINGERS

## 2019-03-27 ASSESSMENT — TONOMETRY
OD_IOP_MMHG: 15
IOP_METHOD: ICARE
OS_IOP_MMHG: 17

## 2019-03-27 ASSESSMENT — EXTERNAL EXAM - LEFT EYE: OS_EXAM: NORMAL

## 2019-03-27 ASSESSMENT — CUP TO DISC RATIO
OS_RATIO: 0.4
OD_RATIO: 0.4

## 2019-03-27 NOTE — NURSING NOTE
"Chief Complaints and History of Present Illnesses   Patient presents with     Post Op (Ophthalmology) Left Eye     Chief Complaint(s) and History of Present Illness(es)     Post Op (Ophthalmology) Left Eye     Laterality: left eye    Course: stable    Associated symptoms: Negative for floaters and flashes    Treatments tried: eye drops    Pain scale: 0/10              Comments     POW#4 LE s/p CE/IOL    Vision is \"good.\" Would like to obtain new gls Rx today.    Ocular meds: Ketorolac tid LE, Predforte tid LE    DM2   Last BS: 200 today @ 6am                  Last A1c: 7.0  (per Pt )  01/2019       A1C                      14.2                08/11/2008                 A1C                      12.7                03/03/2008                 A1C                      8.8                 08/13/2007                 A1C                      12.8                03/02/2007                 A1C                      7.9                 04/11/2006                Vidhi Reed COT 9:03 AM March 27, 2019                   " The patient is a 9y Female complaining of chest pain.

## 2019-03-27 NOTE — PROGRESS NOTES
BERNABE Fierro is a 68 year old male here for follow-up after CE/IOL left eye with intra-op avastin injection for DME. He is pleased with his improvement in vision.  No pain, redness, discharge. No flashes/floaters.    POH: Cataract surgery in 2012 OD  PMH: Diabetes, type 2  FH: No FH of glc  SH: Non-smoker    Assessment & Plan    (Z96.1) Pseudophakia, both eyes  Comment: S/p CE/IOL with intra-op avastin. Good post-op appearance of anterior segment left eye. Increased macular edema on exam and OCT not surprising.   Left eye surgery performed elsewhere > 8 years ago. 3 piece IOL in the capsular bag. Mild PCO (s/p YAG in 2015)   Plan: Continue Prednisolone and ketorolac 4x daily left eye  Follow-up in retina clinic 3-4 weeks    (E11.6394) Non-proliferative diabetic retinopathy, both eyes (H)  Comment: With center-involving DME. Seen in retina clinic prior to CE/IOL. He declined anti-VEGF given good vision right eye and is afraid of complications.  Plan: Return to retina clinic as above     -----------------------------------------------------------------------------------    Patient disposition:   Return to retina clinic next couple of weeks or sooner as needed.    Teaching statement:  Complete documentation of historical and exam elements from today's encounter can be found in the full encounter summary report (not reduplicated in this progress note). I personally obtained the chief complaint(s) and history of present illness.  I confirmed and edited as necessary the review of systems, past medical/surgical history, family history, social history, and examination findings as documented by others; and I examined the patient myself. I personally reviewed the relevant tests, images, and reports as documented above.     I formulated and edited as necessary the assessment and plan and discussed the findings and management plan with the patient and family.      Kendal Vazquez MD  Comprehensive Ophthalmology &  Ocular Pathology  Department of Ophthalmology and Visual Neurosciences  [email or web address]  Pager [phone]

## 2021-08-06 ENCOUNTER — LAB REQUISITION (OUTPATIENT)
Dept: LAB | Facility: CLINIC | Age: 71
End: 2021-08-06
Payer: MEDICARE

## 2021-08-06 DIAGNOSIS — E03.8 OTHER SPECIFIED HYPOTHYROIDISM: ICD-10-CM

## 2021-08-06 DIAGNOSIS — Z12.5 ENCOUNTER FOR SCREENING FOR MALIGNANT NEOPLASM OF PROSTATE: ICD-10-CM

## 2021-08-06 DIAGNOSIS — E11.9 TYPE 2 DIABETES MELLITUS WITHOUT COMPLICATIONS (H): ICD-10-CM

## 2021-08-06 DIAGNOSIS — Z11.3 ENCOUNTER FOR SCREENING FOR INFECTIONS WITH A PREDOMINANTLY SEXUAL MODE OF TRANSMISSION: ICD-10-CM

## 2021-08-06 DIAGNOSIS — I10 ESSENTIAL (PRIMARY) HYPERTENSION: ICD-10-CM

## 2021-08-06 LAB
ALBUMIN SERPL-MCNC: 2.7 G/DL (ref 3.4–5)
ANION GAP SERPL CALCULATED.3IONS-SCNC: 11 MMOL/L (ref 3–14)
BUN SERPL-MCNC: 77 MG/DL (ref 7–30)
CALCIUM SERPL-MCNC: 8.2 MG/DL (ref 8.5–10.1)
CHLORIDE BLD-SCNC: 104 MMOL/L (ref 94–109)
CHOLEST SERPL-MCNC: 100 MG/DL
CO2 SERPL-SCNC: 17 MMOL/L (ref 20–32)
CREAT SERPL-MCNC: 6.36 MG/DL (ref 0.66–1.25)
FASTING STATUS PATIENT QL REPORTED: NO
GFR SERPL CREATININE-BSD FRML MDRD: 8 ML/MIN/1.73M2
GLUCOSE BLD-MCNC: 346 MG/DL (ref 70–99)
HDLC SERPL-MCNC: 27 MG/DL
LDLC SERPL CALC-MCNC: 38 MG/DL
NONHDLC SERPL-MCNC: 73 MG/DL
PHOSPHATE SERPL-MCNC: 3.5 MG/DL (ref 2.5–4.5)
POTASSIUM BLD-SCNC: 4.4 MMOL/L (ref 3.4–5.3)
SODIUM SERPL-SCNC: 132 MMOL/L (ref 133–144)
TRIGL SERPL-MCNC: 176 MG/DL
TSH SERPL DL<=0.005 MIU/L-ACNC: 2.51 MU/L (ref 0.4–4)

## 2021-08-06 PROCEDURE — 86803 HEPATITIS C AB TEST: CPT | Mod: ORL | Performed by: FAMILY MEDICINE

## 2021-08-06 PROCEDURE — 82043 UR ALBUMIN QUANTITATIVE: CPT | Mod: ORL | Performed by: FAMILY MEDICINE

## 2021-08-06 PROCEDURE — 80069 RENAL FUNCTION PANEL: CPT | Mod: ORL | Performed by: FAMILY MEDICINE

## 2021-08-06 PROCEDURE — 82607 VITAMIN B-12: CPT | Mod: ORL | Performed by: FAMILY MEDICINE

## 2021-08-06 PROCEDURE — 84443 ASSAY THYROID STIM HORMONE: CPT | Mod: ORL | Performed by: FAMILY MEDICINE

## 2021-08-06 PROCEDURE — 87389 HIV-1 AG W/HIV-1&-2 AB AG IA: CPT | Mod: ORL | Performed by: FAMILY MEDICINE

## 2021-08-06 PROCEDURE — 80061 LIPID PANEL: CPT | Mod: ORL | Performed by: FAMILY MEDICINE

## 2021-08-06 PROCEDURE — 87340 HEPATITIS B SURFACE AG IA: CPT | Mod: ORL | Performed by: FAMILY MEDICINE

## 2021-08-06 PROCEDURE — G0103 PSA SCREENING: HCPCS | Mod: ORL | Performed by: FAMILY MEDICINE

## 2021-08-07 LAB
CREAT UR-MCNC: 91 MG/DL
MICROALBUMIN UR-MCNC: 1590 MG/L
MICROALBUMIN/CREAT UR: 1747.25 MG/G CR (ref 0–17)
PSA SERPL-MCNC: 1.09 UG/L (ref 0–4)
VIT B12 SERPL-MCNC: 910 PG/ML (ref 193–986)

## 2021-08-08 LAB
HBV SURFACE AG SERPL QL IA: NONREACTIVE
HCV AB SERPL QL IA: NONREACTIVE
HIV 1+2 AB+HIV1 P24 AG SERPL QL IA: NONREACTIVE

## 2021-08-31 ENCOUNTER — LAB REQUISITION (OUTPATIENT)
Dept: LAB | Facility: CLINIC | Age: 71
End: 2021-08-31
Payer: MEDICARE

## 2021-08-31 DIAGNOSIS — R00.0 TACHYCARDIA, UNSPECIFIED: ICD-10-CM

## 2021-08-31 LAB
T4 FREE SERPL-MCNC: 1.63 NG/DL (ref 0.76–1.46)
TSH SERPL DL<=0.005 MIU/L-ACNC: 0.07 MU/L (ref 0.4–4)

## 2021-08-31 PROCEDURE — 84439 ASSAY OF FREE THYROXINE: CPT | Mod: ORL | Performed by: INTERNAL MEDICINE

## 2021-08-31 PROCEDURE — 84443 ASSAY THYROID STIM HORMONE: CPT | Mod: ORL | Performed by: INTERNAL MEDICINE

## 2021-09-01 ENCOUNTER — TRANSCRIBE ORDERS (OUTPATIENT)
Dept: OTHER | Age: 71
End: 2021-09-01

## 2021-09-01 DIAGNOSIS — N18.5 CKD (CHRONIC KIDNEY DISEASE) STAGE 5, GFR LESS THAN 15 ML/MIN (H): Primary | ICD-10-CM

## 2021-09-09 ENCOUNTER — LAB REQUISITION (OUTPATIENT)
Dept: LAB | Facility: CLINIC | Age: 71
End: 2021-09-09
Payer: MEDICARE

## 2021-09-09 DIAGNOSIS — D63.1 ANEMIA IN CHRONIC KIDNEY DISEASE (CODE): ICD-10-CM

## 2021-09-09 DIAGNOSIS — N18.5 CHRONIC KIDNEY DISEASE, STAGE 5 (H): ICD-10-CM

## 2021-09-09 DIAGNOSIS — E21.3 HYPERPARATHYROIDISM, UNSPECIFIED (H): ICD-10-CM

## 2021-09-09 LAB
FERRITIN SERPL-MCNC: 364 NG/ML (ref 26–388)
IRON SATN MFR SERPL: 16 % (ref 15–46)
IRON SERPL-MCNC: 37 UG/DL (ref 35–180)
PTH-INTACT SERPL-MCNC: 741 PG/ML (ref 18–80)
TIBC SERPL-MCNC: 226 UG/DL (ref 240–430)

## 2021-09-09 PROCEDURE — 82306 VITAMIN D 25 HYDROXY: CPT | Mod: ORL | Performed by: INTERNAL MEDICINE

## 2021-09-09 PROCEDURE — 83550 IRON BINDING TEST: CPT | Mod: ORL | Performed by: INTERNAL MEDICINE

## 2021-09-09 PROCEDURE — 83970 ASSAY OF PARATHORMONE: CPT | Mod: ORL | Performed by: INTERNAL MEDICINE

## 2021-09-09 PROCEDURE — 82728 ASSAY OF FERRITIN: CPT | Mod: ORL | Performed by: INTERNAL MEDICINE

## 2021-09-09 PROCEDURE — 36415 COLL VENOUS BLD VENIPUNCTURE: CPT | Mod: ORL | Performed by: INTERNAL MEDICINE

## 2021-09-10 LAB — DEPRECATED CALCIDIOL+CALCIFEROL SERPL-MC: 24 UG/L (ref 20–75)

## 2023-06-07 ENCOUNTER — LAB REQUISITION (OUTPATIENT)
Dept: LAB | Facility: CLINIC | Age: 73
End: 2023-06-07

## 2023-06-07 ENCOUNTER — LAB REQUISITION (OUTPATIENT)
Dept: LAB | Facility: CLINIC | Age: 73
End: 2023-06-07
Payer: COMMERCIAL

## 2023-06-07 DIAGNOSIS — E11.9 TYPE 2 DIABETES MELLITUS WITHOUT COMPLICATIONS (H): ICD-10-CM

## 2023-06-07 DIAGNOSIS — Z94.0 KIDNEY TRANSPLANT STATUS: ICD-10-CM

## 2023-06-07 DIAGNOSIS — Z79.4 LONG TERM (CURRENT) USE OF INSULIN (H): ICD-10-CM

## 2023-06-07 LAB
ALBUMIN SERPL BCG-MCNC: 3.8 G/DL (ref 3.5–5.2)
ALP SERPL-CCNC: 140 U/L (ref 40–129)
ALT SERPL W P-5'-P-CCNC: 33 U/L (ref 10–50)
ANION GAP SERPL CALCULATED.3IONS-SCNC: 13 MMOL/L (ref 7–15)
AST SERPL W P-5'-P-CCNC: 22 U/L (ref 10–50)
BILIRUB SERPL-MCNC: 0.5 MG/DL
BUN SERPL-MCNC: 17.9 MG/DL (ref 8–23)
CALCIUM SERPL-MCNC: 9.1 MG/DL (ref 8.8–10.2)
CHLORIDE SERPL-SCNC: 102 MMOL/L (ref 98–107)
CREAT SERPL-MCNC: 1.14 MG/DL (ref 0.67–1.17)
DEPRECATED HCO3 PLAS-SCNC: 20 MMOL/L (ref 22–29)
GFR SERPL CREATININE-BSD FRML MDRD: 67 ML/MIN/1.73M2
GLUCOSE SERPL-MCNC: 263 MG/DL (ref 70–99)
POTASSIUM SERPL-SCNC: 4.2 MMOL/L (ref 3.4–5.3)
PROT SERPL-MCNC: 6.5 G/DL (ref 6.4–8.3)
SODIUM SERPL-SCNC: 135 MMOL/L (ref 136–145)
TACROLIMUS BLD-MCNC: 7 UG/L (ref 5–15)
TME LAST DOSE: NORMAL H
TME LAST DOSE: NORMAL H

## 2023-06-07 PROCEDURE — 80197 ASSAY OF TACROLIMUS: CPT | Performed by: INTERNAL MEDICINE

## 2023-06-07 PROCEDURE — 80053 COMPREHEN METABOLIC PANEL: CPT | Performed by: INTERNAL MEDICINE

## 2023-06-12 ENCOUNTER — TRANSCRIBE ORDERS (OUTPATIENT)
Dept: OTHER | Age: 73
End: 2023-06-12

## 2023-06-12 DIAGNOSIS — Z94.0 RENAL TRANSPLANT RECIPIENT: ICD-10-CM

## 2023-06-12 DIAGNOSIS — I10 ESSENTIAL HYPERTENSION: Primary | ICD-10-CM

## 2023-06-13 ENCOUNTER — TELEPHONE (OUTPATIENT)
Dept: TRANSPLANT | Facility: CLINIC | Age: 73
End: 2023-06-13
Payer: MEDICAID

## 2023-06-13 DIAGNOSIS — Z79.899 ENCOUNTER FOR LONG-TERM (CURRENT) USE OF HIGH-RISK MEDICATION: ICD-10-CM

## 2023-06-13 DIAGNOSIS — D84.9 IMMUNOSUPPRESSED STATUS (H): ICD-10-CM

## 2023-06-13 DIAGNOSIS — Z20.828 CONTACT WITH AND (SUSPECTED) EXPOSURE TO OTHER VIRAL COMMUNICABLE DISEASES: ICD-10-CM

## 2023-06-13 DIAGNOSIS — Z94.0 KIDNEY REPLACED BY TRANSPLANT: Primary | ICD-10-CM

## 2023-06-13 NOTE — TELEPHONE ENCOUNTER
[facility] Call Center    Phone Message    May a detailed message be left on voicemail: yes     Reason for Call: Patient calling to schedule in neph, he had a transplant in [place]. He is running out of medication tonight and was requesting to be seen tomorrow. Has never been seen at [facility]. Informed patient of protocols to send TE to see a transplant provider and unsure if he will be able to get in tomorrow. Please advise patient what to do about medication. Thank you     Action Taken: Message routed to:  [facility] ([facility] NEPH    Travel Screening: Not Applicable

## 2023-06-14 ENCOUNTER — APPOINTMENT (OUTPATIENT)
Dept: GENERAL RADIOLOGY | Facility: CLINIC | Age: 73
End: 2023-06-14
Attending: FAMILY MEDICINE
Payer: COMMERCIAL

## 2023-06-14 ENCOUNTER — HOSPITAL ENCOUNTER (EMERGENCY)
Facility: CLINIC | Age: 73
Discharge: HOME | End: 2023-06-14
Attending: FAMILY MEDICINE | Admitting: FAMILY MEDICINE
Payer: COMMERCIAL

## 2023-06-14 VITALS
HEART RATE: 100 BPM | RESPIRATION RATE: 16 BRPM | DIASTOLIC BLOOD PRESSURE: 71 MMHG | SYSTOLIC BLOOD PRESSURE: 137 MMHG | BODY MASS INDEX: 31.2 KG/M2 | WEIGHT: 198.8 LBS | HEIGHT: 67 IN | TEMPERATURE: 98.8 F | OXYGEN SATURATION: 100 %

## 2023-06-14 DIAGNOSIS — Z94.0 KIDNEY REPLACED BY TRANSPLANT: ICD-10-CM

## 2023-06-14 DIAGNOSIS — M54.9 UPPER BACK PAIN ON RIGHT SIDE: ICD-10-CM

## 2023-06-14 LAB
ALBUMIN SERPL BCG-MCNC: 4.1 G/DL (ref 3.5–5.2)
ALBUMIN UR-MCNC: NEGATIVE MG/DL
ALP SERPL-CCNC: 133 U/L (ref 40–129)
ALT SERPL W P-5'-P-CCNC: 26 U/L (ref 0–70)
ANION GAP SERPL CALCULATED.3IONS-SCNC: 10 MMOL/L (ref 7–15)
APPEARANCE UR: CLEAR
AST SERPL W P-5'-P-CCNC: 26 U/L (ref 0–45)
BACTERIA #/AREA URNS HPF: ABNORMAL /HPF
BASOPHILS # BLD AUTO: 0 10E3/UL (ref 0–0.2)
BASOPHILS NFR BLD AUTO: 0 %
BILIRUB SERPL-MCNC: 0.6 MG/DL
BILIRUB UR QL STRIP: NEGATIVE
BUN SERPL-MCNC: 21.1 MG/DL (ref 8–23)
CALCIUM SERPL-MCNC: 9.2 MG/DL (ref 8.8–10.2)
CHLORIDE SERPL-SCNC: 102 MMOL/L (ref 98–107)
COLOR UR AUTO: ABNORMAL
CREAT SERPL-MCNC: 1.17 MG/DL (ref 0.67–1.17)
DEPRECATED HCO3 PLAS-SCNC: 26 MMOL/L (ref 22–29)
EOSINOPHIL # BLD AUTO: 0.1 10E3/UL (ref 0–0.7)
EOSINOPHIL NFR BLD AUTO: 1 %
ERYTHROCYTE [DISTWIDTH] IN BLOOD BY AUTOMATED COUNT: 13.9 % (ref 10–15)
GFR SERPL CREATININE-BSD FRML MDRD: 65 ML/MIN/1.73M2
GLUCOSE BLDC GLUCOMTR-MCNC: 251 MG/DL (ref 70–99)
GLUCOSE SERPL-MCNC: 218 MG/DL (ref 70–99)
GLUCOSE UR STRIP-MCNC: NEGATIVE MG/DL
HCT VFR BLD AUTO: 40.7 % (ref 40–53)
HGB BLD-MCNC: 13.2 G/DL (ref 13.3–17.7)
HGB UR QL STRIP: NEGATIVE
IMM GRANULOCYTES # BLD: 0.1 10E3/UL
IMM GRANULOCYTES NFR BLD: 1 %
KETONES UR STRIP-MCNC: NEGATIVE MG/DL
LEUKOCYTE ESTERASE UR QL STRIP: NEGATIVE
LYMPHOCYTES # BLD AUTO: 1.2 10E3/UL (ref 0.8–5.3)
LYMPHOCYTES NFR BLD AUTO: 13 %
MCH RBC QN AUTO: 29.9 PG (ref 26.5–33)
MCHC RBC AUTO-ENTMCNC: 32.4 G/DL (ref 31.5–36.5)
MCV RBC AUTO: 92 FL (ref 78–100)
MONOCYTES # BLD AUTO: 0.8 10E3/UL (ref 0–1.3)
MONOCYTES NFR BLD AUTO: 9 %
MUCOUS THREADS #/AREA URNS LPF: PRESENT /LPF
NEUTROPHILS # BLD AUTO: 7.3 10E3/UL (ref 1.6–8.3)
NEUTROPHILS NFR BLD AUTO: 76 %
NITRATE UR QL: NEGATIVE
NRBC # BLD AUTO: 0 10E3/UL
NRBC BLD AUTO-RTO: 0 /100
PH UR STRIP: 5 [PH] (ref 5–7)
PLATELET # BLD AUTO: 222 10E3/UL (ref 150–450)
POTASSIUM SERPL-SCNC: 4.3 MMOL/L (ref 3.4–5.3)
PROT SERPL-MCNC: 6.9 G/DL (ref 6.4–8.3)
RBC # BLD AUTO: 4.41 10E6/UL (ref 4.4–5.9)
RBC URINE: 0 /HPF
SODIUM SERPL-SCNC: 138 MMOL/L (ref 136–145)
SP GR UR STRIP: 1.01 (ref 1–1.03)
UROBILINOGEN UR STRIP-MCNC: NORMAL MG/DL
WBC # BLD AUTO: 9.6 10E3/UL (ref 4–11)
WBC URINE: <1 /HPF

## 2023-06-14 PROCEDURE — 250N000012 HC RX MED GY IP 250 OP 636 PS 637: Performed by: FAMILY MEDICINE

## 2023-06-14 PROCEDURE — 80053 COMPREHEN METABOLIC PANEL: CPT | Performed by: FAMILY MEDICINE

## 2023-06-14 PROCEDURE — 85025 COMPLETE CBC W/AUTO DIFF WBC: CPT | Performed by: FAMILY MEDICINE

## 2023-06-14 PROCEDURE — 82962 GLUCOSE BLOOD TEST: CPT

## 2023-06-14 PROCEDURE — 81003 URINALYSIS AUTO W/O SCOPE: CPT | Performed by: FAMILY MEDICINE

## 2023-06-14 PROCEDURE — 99284 EMERGENCY DEPT VISIT MOD MDM: CPT | Performed by: FAMILY MEDICINE

## 2023-06-14 PROCEDURE — 71046 X-RAY EXAM CHEST 2 VIEWS: CPT

## 2023-06-14 PROCEDURE — 99284 EMERGENCY DEPT VISIT MOD MDM: CPT | Mod: 25 | Performed by: FAMILY MEDICINE

## 2023-06-14 PROCEDURE — 36415 COLL VENOUS BLD VENIPUNCTURE: CPT | Performed by: FAMILY MEDICINE

## 2023-06-14 RX ORDER — MYCOPHENOLIC ACID 360 MG/1
360 TABLET, DELAYED RELEASE ORAL 2 TIMES DAILY
Qty: 30 TABLET | Refills: 0 | Status: SHIPPED | OUTPATIENT
Start: 2023-06-14 | End: 2023-06-20

## 2023-06-14 RX ORDER — ACETAMINOPHEN 500 MG
500-1000 TABLET ORAL EVERY 6 HOURS PRN
Qty: 30 TABLET | Refills: 0 | Status: SHIPPED | OUTPATIENT
Start: 2023-06-14 | End: 2023-06-21

## 2023-06-14 RX ORDER — TACROLIMUS 1 MG/1
2 CAPSULE ORAL 2 TIMES DAILY
Qty: 30 CAPSULE | Refills: 0 | Status: SHIPPED | OUTPATIENT
Start: 2023-06-14 | End: 2023-06-20

## 2023-06-14 RX ADMIN — INSULIN ASPART 10 UNITS: 100 INJECTION, SOLUTION INTRAVENOUS; SUBCUTANEOUS at 14:54

## 2023-06-14 ASSESSMENT — ACTIVITIES OF DAILY LIVING (ADL)
ADLS_ACUITY_SCORE: 35
ADLS_ACUITY_SCORE: 33
ADLS_ACUITY_SCORE: 35

## 2023-06-14 NOTE — ED TRIAGE NOTES
Patient denies any numbness or tingling to extremties     Triage Assessment     Row Name 06/14/23 1021       Triage Assessment (Adult)    Airway WDL WDL       Respiratory WDL    Respiratory WDL WDL       Skin Circulation/Temperature WDL    Skin Circulation/Temperature WDL WDL       Cardiac WDL    Cardiac WDL WDL       Peripheral/Neurovascular WDL    Peripheral Neurovascular WDL WDL       Cognitive/Neuro/Behavioral WDL    Cognitive/Neuro/Behavioral WDL WDL

## 2023-06-14 NOTE — ED NOTES
Patient also requesting refill of his transplant medications and a referral to a nephrologist since he had Kidney transplant in feb.

## 2023-06-14 NOTE — ED PROVIDER NOTES
[place] [facility]ERGENCY DEPARTMENT ([facility])    6/14/23       ED PROVIDER NOTE  ED 03    History     Chief Complaint   Patient presents with     Back Pain     Back pain x 2 weeks     HPI  Jass Fierro is a 72 year old male with a past medical history significant for DM2, ESRD s/p kidney transplant (2/1/23) who presents to the Emergency Department for evaluation of back pain. Patient is present with his son who translates for him. Patient presents with upper right back pain for the last two weeks which radiates down the right side of his back. He also endorses pain in his right neck and arm at times. He states these pain comes and goes. Patient has been using a heat cream which only helps for a short while. He denies any pain when breathing. He denies any abdominal pain. Patient has tried using tylenol with no relief. He denies any numbness or tingling in his extremities. Patient is status post a kidney transplant in [place] on February 1st, 2023. Patient recently [place] the [place] and had a cough for 2-3 days which eventually went away. This did not aggravate any back pain. As of today patient's son states patient has ran out of his kidney transplant medications. Patient has no transplant team set up here so they were looking for resources to have one set up.     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     finished 9 months INH 7/03-4/04     Type II or unspecified type diabetes mellitus without mention of complication, not stated as uncontrolled      Unspecified essential hypertension      Past Surgical History:   Procedure Laterality Date     CATARACT IOL, RT/LT Right 2012    doctor and clinic unknown to pt     PHACOEMULSIFICATION WITH STANDARD INTRAOCULAR LENS IMPLANT Left 3/1/2019  "   Procedure: Left Eye Cataract Removal with Intraocular Lens Implant;  Surgeon: Kendal Vazquez MD;  Location:  OR     [facility] NONSPECIFIC PROCEDURE  03/2002    Fistulotomy + partial lat. internal sphincterotomy     acetaminophen (TYLENOL) 500 MG tablet  mycophenolic acid (GENERIC EQUIVALENT) 360 MG EC tablet  tacrolimus (GENERIC EQUIVALENT) 1 MG capsule  amLODIPine (NORVASC) 5 MG tablet  ASPIRIN 81 MG OR TABS  chlorthalidone (HYGROTEN) 25 MG tablet  Cholecalciferol (VITAMIN D) 2000 UNIT CAPS  glimepiride (AMARYL) 2 MG tablet  ketorolac (ACULAR) 0.5 % ophthalmic solution  linagliptin (TRADJENTA) 5 MG TABS tablet  liraglutide (VICTOZA) 18 MG/3ML SOLN  losartan (COZAAR) 50 MG tablet  metoprolol (TOPROL-XL) 50 MG 24 hr tablet  prednisoLONE acetate (PRED FORTE) 1 % ophthalmic suspension      Allergies   Allergen Reactions     Seasonal Allergies Itching     Family History  Family History   Problem Relation Age of Onset     Family History Negative Other      Diabetes Other      Hypertension Other      Diabetes Mother      Diabetes Father      Hypertension Father      Hypertension Brother      Cancer No family hx of      Glaucoma No family hx of      Social History   Social History     Tobacco Use     Smoking status: Never   Substance Use Topics     Alcohol use: No     Drug use: No      Past medical history, past surgical history, medications, allergies, family history, and social history were reviewed with the patient. No additional pertinent items.      A medically appropriate review of systems was performed with pertinent positives and negatives noted in the HPI, and all other systems negative.    Physical Exam   BP: 137/71  Pulse: 100  Temp: 98.8  F (37.1  C)  Resp: 16  Height: 170.2 cm (5' 7\")  Weight: 90.2 kg (198 lb 12.8 oz)  SpO2: 100 %  Physical Exam  Vitals and nursing note reviewed.   Constitutional:       General: He is not in acute distress.     Appearance: Normal appearance. He is not toxic-appearing. "   HENT:      Head: Atraumatic.   Eyes:      General: No scleral icterus.     Conjunctiva/sclera: Conjunctivae normal.   Cardiovascular:      Rate and Rhythm: Normal rate.      Heart sounds: Normal heart sounds.   Pulmonary:      Effort: Pulmonary effort is normal. No respiratory distress.      Breath sounds: Normal breath sounds.   Abdominal:      Palpations: Abdomen is soft.      Tenderness: There is no abdominal tenderness.   Musculoskeletal:         General: No deformity.      Cervical back: Neck supple. Tenderness (No bony midline tenderness) present. Pain with movement present.      Thoracic back: Tenderness present.        Back:    Skin:     General: Skin is warm.   Neurological:      General: No focal deficit present.      Mental Status: He is alert and oriented to person, place, and time.      Cranial Nerves: Cranial nerves 2-12 are intact.      Sensory: Sensation is intact.      Motor: Motor function is intact.      Coordination: Coordination is intact.      Deep Tendon Reflexes:      Reflex Scores:       Tricep reflexes are 1+ on the right side and 1+ on the left side.       Bicep reflexes are 1+ on the right side and 1+ on the left side.       Brachioradialis reflexes are 1+ on the right side and 1+ on the left side.          ED Course, Procedures, & Data      Procedures                      Results for orders placed or performed during the hospital encounter of 06/14/23   XR Chest 2 Views     Status: None    Narrative    CHEST TWO VIEWS   6/14/2023 12:15 PM     HISTORY: Upper back pain and cough.    COMPARISON: None available.      Impression    IMPRESSION: PA and lateral views of the chest were obtained.  Cardiomediastinal silhouette is within normal limits. No suspicious  focal pulmonary opacities. No significant pleural effusion or  pneumothorax.    HOWIE LEE MD         SYSTEM ID:  [id number]   Comprehensive metabolic panel     Status: Abnormal   Result Value Ref Range    Sodium 138 136 - 145  mmol/L    Potassium 4.3 3.4 - 5.3 mmol/L    Chloride 102 98 - 107 mmol/L    Carbon Dioxide (CO2) 26 22 - 29 mmol/L    Anion Gap 10 7 - 15 mmol/L    Urea Nitrogen 21.1 8.0 - 23.0 mg/dL    Creatinine 1.17 0.67 - 1.17 mg/dL    Calcium 9.2 8.8 - 10.2 mg/dL    Glucose 218 (H) 70 - 99 mg/dL    Alkaline Phosphatase 133 (H) 40 - 129 U/L    AST 26 0 - 45 U/L    ALT 26 0 - 70 U/L    Protein Total 6.9 6.4 - 8.3 g/dL    Albumin 4.1 3.5 - 5.2 g/dL    Bilirubin Total 0.6 <=1.2 mg/dL    GFR Estimate 65 >60 mL/min/1.73m2   UA with Microscopic reflex to Culture     Status: Abnormal    Specimen: Urine, Clean Catch   Result Value Ref Range    Color Urine Straw Colorless, Straw, Light Yellow, Yellow    Appearance Urine Clear Clear    Glucose Urine Negative Negative mg/dL    Bilirubin Urine Negative Negative    Ketones Urine Negative Negative mg/dL    Specific Gravity Urine 1.006 1.003 - 1.035    Blood Urine Negative Negative    pH Urine 5.0 5.0 - 7.0    Protein Albumin Urine Negative Negative mg/dL    Urobilinogen Urine Normal Normal, 2.0 mg/dL    Nitrite Urine Negative Negative    Leukocyte Esterase Urine Negative Negative    Bacteria Urine Few (A) None Seen /HPF    Mucus Urine Present (A) None Seen /LPF    RBC Urine 0 <=2 /HPF    WBC Urine <1 <=5 /HPF    Narrative    Urine Culture not indicated   CBC with platelets and differential     Status: Abnormal   Result Value Ref Range    WBC Count 9.6 4.0 - 11.0 10e3/uL    RBC Count 4.41 4.40 - 5.90 10e6/uL    Hemoglobin 13.2 (L) 13.3 - 17.7 g/dL    Hematocrit 40.7 40.0 - 53.0 %    MCV 92 78 - 100 fL    MCH 29.9 26.5 - 33.0 pg    MCHC 32.4 31.5 - 36.5 g/dL    RDW 13.9 10.0 - 15.0 %    Platelet Count 222 150 - 450 10e3/uL    % Neutrophils 76 %    % Lymphocytes 13 %    % Monocytes 9 %    % Eosinophils 1 %    % Basophils 0 %    % Immature Granulocytes 1 %    NRBCs per 100 WBC 0 <1 /100    Absolute Neutrophils 7.3 1.6 - 8.3 10e3/uL    Absolute Lymphocytes 1.2 0.8 - 5.3 10e3/uL    Absolute  Monocytes 0.8 0.0 - 1.3 10e3/uL    Absolute Eosinophils 0.1 0.0 - 0.7 10e3/uL    Absolute Basophils 0.0 0.0 - 0.2 10e3/uL    Absolute Immature Granulocytes 0.1 <=0.4 10e3/uL    Absolute NRBCs 0.0 10e3/uL   Glucose by meter     Status: Abnormal   Result Value Ref Range    GLUCOSE BY METER POCT 251 (H) 70 - 99 mg/dL   CBC with platelets differential     Status: Abnormal    Narrative    The following orders were created for panel order CBC with platelets differential.  Procedure                               Abnormality         Status                     ---------                               -----------         ------                     CBC with platelets and d...[[id number]]  Abnormal            Final result                 Please view results for these tests on the individual orders.     Medications   insulin aspart (NovoLOG) injection (RAPID ACTING) (10 Units Subcutaneous $Given 6/14/23 4518)     Labs Ordered and Resulted from Time of ED Arrival to Time of ED Departure   COMPREHENSIVE METABOLIC PANEL - Abnormal       Result Value    Sodium 138      Potassium 4.3      Chloride 102      Carbon Dioxide (CO2) 26      Anion Gap 10      Urea Nitrogen 21.1      Creatinine 1.17      Calcium 9.2      Glucose 218 (*)     Alkaline Phosphatase 133 (*)     AST 26      ALT 26      Protein Total 6.9      Albumin 4.1      Bilirubin Total 0.6      GFR Estimate 65     ROUTINE UA WITH MICROSCOPIC REFLEX TO CULTURE - Abnormal    Color Urine Straw      Appearance Urine Clear      Glucose Urine Negative      Bilirubin Urine Negative      Ketones Urine Negative      Specific Gravity Urine 1.006      Blood Urine Negative      pH Urine 5.0      Protein Albumin Urine Negative      Urobilinogen Urine Normal      Nitrite Urine Negative      Leukocyte Esterase Urine Negative      Bacteria Urine Few (*)     Mucus Urine Present (*)     RBC Urine 0      WBC Urine <1     CBC WITH PLATELETS AND DIFFERENTIAL - Abnormal    WBC Count 9.6      RBC  Count 4.41      Hemoglobin 13.2 (*)     Hematocrit 40.7      MCV 92      MCH 29.9      MCHC 32.4      RDW 13.9      Platelet Count 222      % Neutrophils 76      % Lymphocytes 13      % Monocytes 9      % Eosinophils 1      % Basophils 0      % Immature Granulocytes 1      NRBCs per 100 WBC 0      Absolute Neutrophils 7.3      Absolute Lymphocytes 1.2      Absolute Monocytes 0.8      Absolute Eosinophils 0.1      Absolute Basophils 0.0      Absolute Immature Granulocytes 0.1      Absolute NRBCs 0.0     GLUCOSE BY METER - Abnormal    GLUCOSE BY METER POCT 251 (*)    GLUCOSE MONITOR NURSING POCT     XR Chest 2 Views   Final Result   IMPRESSION: PA and lateral views of the chest were obtained.   Cardiomediastinal silhouette is within normal limits. No suspicious   focal pulmonary opacities. No significant pleural effusion or   pneumothorax.      HOWIE LEE MD            SYSTEM ID:  [id number]             Critical care was not performed.     Medical Decision Making  The patient's presentation was of moderate complexity (an acute illness with systemic symptoms).    The patient's evaluation involved:  an assessment requiring an independent historian (Patient's son accompanies him and provides collateral information)  review of external note(s) from 2 sources (Notes from prior nephrology visits in November 2022 and notes from surgeon in [facility] regarding kidney transplant now scanned in the chart)  ordering and/or review of 3+ test(s) in this encounter (see separate area of note for details)  review of 3+ test result(s) ordered prior to this encounter (Panel of labs including creatinine, electrolytes and tacrolimus level from visit acute clinic 6/7/2023)  discussion of management or test interpretation with another health professional (Transplant nephrology fellow)    The patient's management necessitated moderate risk (prescription drug management including medications given in the ED).      Assessment & Plan    A  72-year-old man who has a history of end-stage renal disease, who had a renal transplant while in [place] in February 2023.  [place] and has not been connected with kidney transplant service here or nephrology here.  He is out of his tacrolimus and mycophenolate.  He had right upper back pain which is somewhat acute on chronic it is clearly musculoskeletal by history and exam.  His labs and chest x-ray revealed normal creatinine and normal chest x-ray negative CBC.  His creatinine is very close to the labs performed on 6 7 at [facility] and his tacrolimus level on that day within reasonable limits.  It appears the primary reason for their presentation here was that the patient has run out of his immunosuppressive's today, they presented their paperwork from [facility] regarding the doses of the immunosuppressive medications.  I discussed with transplant nephrology who will agree to contact the patient follow-up with him and set him up for coordination of his case, levels, prescriptions etc.  They asked if I could give a 2-week supply of tacrolimus and mycophenolate which I did.  Patient is also taking prednisone 7.5 mg daily but has no need for a refill.  Based on the clinical findings and the entire clinical scenario, the patient appears stable at this time to be treated symptomatically, and to follow-up as an outpatient for any further evaluation and treatment.  Discussed expected course, need for follow up, and indications for return with the patient.  See discharge instructions.      I have reviewed the nursing notes. I have reviewed the findings, diagnosis, plan and need for follow up with the patient.    Discharge Medication List as of 6/14/2023  2:42 PM      START taking these medications    Details   acetaminophen (TYLENOL) 500 MG tablet Take 1-2 tablets (500-1,000 mg) by mouth every 6 hours as needed for pain, Disp-30 tablet, R-0, E-Prescribe      mycophenolic acid (GENERIC EQUIVALENT) 360 MG EC  tablet Take 1 tablet (360 mg) by mouth 2 times daily, Disp-30 tablet, R-0, E-Prescribe      tacrolimus (GENERIC EQUIVALENT) 1 MG capsule Take 2 capsules (2 mg) by mouth 2 times daily, Disp-30 capsule, R-0, E-Prescribe             Final diagnoses:   Upper back pain on right side   Kidney replaced by transplant   SHARRON ALICEA, am serving as a trained medical scribe to document services personally performed by Mark Singh MD, based on the provider's statements to me.      IMark MD, was physically present and have reviewed and verified the accuracy of this note documented by SHARRON LEI.     Mark Singh MD  [facility] EMERGENCY DEPARTMENT  6/14/2023     Mark Singh MD  06/14/23 [id number]

## 2023-06-14 NOTE — DISCHARGE INSTRUCTIONS
Thank you for choosing [facility].     Please closely monitor for further symptoms. Return to the Emergency Department if you develop any new or worsening signs or symptoms.    If you received any opiate pain medications or sedatives during your visit, please do not drive for at least 8 hours.     Labs, cultures or final xray interpretations may still need to be reviewed.  We will call you if your plan of care needs to be changed.    Transplant nephrology should call you within the next 2-3 business days to establish care, set up follow-up, and to check tacrolimus levels.  Please follow up with your primary care physician or clinic also for any other ongoing needs.

## 2023-06-14 NOTE — TELEPHONE ENCOUNTER
Presented to ED for pain / transplant immunosuppression medication.  Will follow up with Dr. Zaid Deng for assumption of transplant cares at [facility].     PCS spoke with Jass. He confirmed that he was transplanted in [facility] on 2/1/23. Jass confirmed that this was a living donor, unrelated.   Donor remains in [place].     He reports that he has records in English that he can bring with him to the appointment tomorrow. PCS asked that he bring all medications, records, log of BP readings, etc.    Jass did confirm that he has been taking his medications as below:  6 & 6 - mycophenolic acid 360mg BID.  8 & 8 - tacrolimus 2mg BID  8 am - prednisone 7.5mg daily  [facility] asked that he take all IS tonight at 7pm, lab appt made for tomorrow at 7a. Jass aware and will draw trough level.

## 2023-06-19 ENCOUNTER — APPOINTMENT (OUTPATIENT)
Dept: INTERPRETER SERVICES | Facility: CLINIC | Age: 73
End: 2023-06-19
Payer: MEDICAID

## 2023-06-20 ENCOUNTER — LAB (OUTPATIENT)
Dept: LAB | Facility: CLINIC | Age: 73
End: 2023-06-20
Payer: COMMERCIAL

## 2023-06-20 ENCOUNTER — TELEPHONE (OUTPATIENT)
Dept: TRANSPLANT | Facility: CLINIC | Age: 73
End: 2023-06-20

## 2023-06-20 ENCOUNTER — OFFICE VISIT (OUTPATIENT)
Dept: TRANSPLANT | Facility: CLINIC | Age: 73
End: 2023-06-20
Attending: INTERNAL MEDICINE
Payer: COMMERCIAL

## 2023-06-20 VITALS
SYSTOLIC BLOOD PRESSURE: 149 MMHG | WEIGHT: 189.6 LBS | DIASTOLIC BLOOD PRESSURE: 90 MMHG | OXYGEN SATURATION: 100 % | BODY MASS INDEX: 29.7 KG/M2 | HEART RATE: 94 BPM | TEMPERATURE: 97.9 F

## 2023-06-20 DIAGNOSIS — Z29.89 NEED FOR PNEUMOCYSTIS PROPHYLAXIS: ICD-10-CM

## 2023-06-20 DIAGNOSIS — Z86.2 HISTORY OF ANEMIA DUE TO CKD: ICD-10-CM

## 2023-06-20 DIAGNOSIS — Z94.0 KIDNEY REPLACED BY TRANSPLANT: ICD-10-CM

## 2023-06-20 DIAGNOSIS — I15.1 HTN, KIDNEY TRANSPLANT RELATED: ICD-10-CM

## 2023-06-20 DIAGNOSIS — E11.21 TYPE 2 DIABETES MELLITUS WITH DIABETIC NEPHROPATHY, UNSPECIFIED WHETHER LONG TERM INSULIN USE (H): ICD-10-CM

## 2023-06-20 DIAGNOSIS — Z94.0 HTN, KIDNEY TRANSPLANT RELATED: ICD-10-CM

## 2023-06-20 DIAGNOSIS — Z48.298 AFTERCARE FOLLOWING ORGAN TRANSPLANT: Primary | ICD-10-CM

## 2023-06-20 DIAGNOSIS — Z79.899 ENCOUNTER FOR LONG-TERM CURRENT USE OF MEDICATION: ICD-10-CM

## 2023-06-20 DIAGNOSIS — Z20.828 CONTACT WITH AND (SUSPECTED) EXPOSURE TO OTHER VIRAL COMMUNICABLE DISEASES: ICD-10-CM

## 2023-06-20 DIAGNOSIS — Z79.899 ENCOUNTER FOR LONG-TERM (CURRENT) USE OF HIGH-RISK MEDICATION: ICD-10-CM

## 2023-06-20 DIAGNOSIS — D84.9 IMMUNOSUPPRESSED STATUS (H): ICD-10-CM

## 2023-06-20 DIAGNOSIS — Z94.0 KIDNEY REPLACED BY TRANSPLANT: Primary | ICD-10-CM

## 2023-06-20 DIAGNOSIS — Z48.298 AFTERCARE FOLLOWING ORGAN TRANSPLANT: ICD-10-CM

## 2023-06-20 DIAGNOSIS — N18.9 HISTORY OF ANEMIA DUE TO CKD: ICD-10-CM

## 2023-06-20 DIAGNOSIS — D84.9 IMMUNOSUPPRESSION (H): ICD-10-CM

## 2023-06-20 LAB
ALBUMIN MFR UR ELPH: 6.1 MG/DL
ANION GAP SERPL CALCULATED.3IONS-SCNC: 8 MMOL/L (ref 7–15)
BUN SERPL-MCNC: 21.3 MG/DL (ref 8–23)
CALCIUM SERPL-MCNC: 9.2 MG/DL (ref 8.8–10.2)
CD3 CELLS # BLD: 1705 CELLS/UL (ref 603–2990)
CD3 CELLS NFR BLD: 78 % (ref 49–84)
CD3+CD4+ CELLS # BLD: 399 CELLS/UL (ref 441–2156)
CD3+CD4+ CELLS NFR BLD: 18 % (ref 28–63)
CD3+CD4+ CELLS/CD3+CD8+ CLL BLD: 0.32 % (ref 1.4–2.6)
CD3+CD8+ CELLS # BLD: 1249 CELLS/UL (ref 125–1312)
CD3+CD8+ CELLS NFR BLD: 57 % (ref 10–40)
CHLORIDE SERPL-SCNC: 103 MMOL/L (ref 98–107)
CMV IGG SERPL IA-ACNC: >10 U/ML
CMV IGG SERPL IA-ACNC: ABNORMAL
CREAT SERPL-MCNC: 1.16 MG/DL (ref 0.67–1.17)
CREAT UR-MCNC: 39.2 MG/DL
DEPRECATED HCO3 PLAS-SCNC: 26 MMOL/L (ref 22–29)
EBV VCA IGG SER IA-ACNC: 181 U/ML
EBV VCA IGG SER IA-ACNC: POSITIVE
ERYTHROCYTE [DISTWIDTH] IN BLOOD BY AUTOMATED COUNT: 14.1 % (ref 10–15)
GFR SERPL CREATININE-BSD FRML MDRD: 66 ML/MIN/1.73M2
GLUCOSE SERPL-MCNC: 127 MG/DL (ref 70–99)
HBA1C MFR BLD: 8.9 %
HCT VFR BLD AUTO: 41.3 % (ref 40–53)
HGB BLD-MCNC: 13.1 G/DL (ref 13.3–17.7)
MCH RBC QN AUTO: 29.1 PG (ref 26.5–33)
MCHC RBC AUTO-ENTMCNC: 31.7 G/DL (ref 31.5–36.5)
MCV RBC AUTO: 92 FL (ref 78–100)
MYCOPHENOLATE SERPL LC/MS/MS-MCNC: 0.92 MG/L (ref 1–3.5)
MYCOPHENOLATE-G SERPL LC/MS/MS-MCNC: 32.9 MG/L (ref 30–95)
PLATELET # BLD AUTO: 267 10E3/UL (ref 150–450)
POTASSIUM SERPL-SCNC: 3.5 MMOL/L (ref 3.4–5.3)
PROT/CREAT 24H UR: 0.16 MG/MG CR (ref 0–0.2)
RBC # BLD AUTO: 4.5 10E6/UL (ref 4.4–5.9)
SODIUM SERPL-SCNC: 137 MMOL/L (ref 136–145)
T CELL COMMENT: ABNORMAL
TACROLIMUS BLD-MCNC: 6.5 UG/L (ref 5–15)
TME LAST DOSE: ABNORMAL H
TME LAST DOSE: ABNORMAL H
TME LAST DOSE: NORMAL H
TME LAST DOSE: NORMAL H
WBC # BLD AUTO: 7.9 10E3/UL (ref 4–11)

## 2023-06-20 PROCEDURE — 87521 HEPATITIS C PROBE&RVRS TRNSC: CPT

## 2023-06-20 PROCEDURE — 99205 OFFICE O/P NEW HI 60 MIN: CPT | Performed by: INTERNAL MEDICINE

## 2023-06-20 PROCEDURE — 86644 CMV ANTIBODY: CPT

## 2023-06-20 PROCEDURE — 87799 DETECT AGENT NOS DNA QUANT: CPT

## 2023-06-20 PROCEDURE — G0463 HOSPITAL OUTPT CLINIC VISIT: HCPCS | Performed by: INTERNAL MEDICINE

## 2023-06-20 PROCEDURE — 80197 ASSAY OF TACROLIMUS: CPT

## 2023-06-20 PROCEDURE — 80048 BASIC METABOLIC PNL TOTAL CA: CPT | Performed by: PATHOLOGY

## 2023-06-20 PROCEDURE — 85027 COMPLETE CBC AUTOMATED: CPT | Performed by: PATHOLOGY

## 2023-06-20 PROCEDURE — 99000 SPECIMEN HANDLING OFFICE-LAB: CPT | Performed by: PATHOLOGY

## 2023-06-20 PROCEDURE — 84100 ASSAY OF PHOSPHORUS: CPT | Performed by: INTERNAL MEDICINE

## 2023-06-20 PROCEDURE — 99417 PROLNG OP E/M EACH 15 MIN: CPT | Performed by: INTERNAL MEDICINE

## 2023-06-20 PROCEDURE — 83735 ASSAY OF MAGNESIUM: CPT | Performed by: INTERNAL MEDICINE

## 2023-06-20 PROCEDURE — 80180 DRUG SCRN QUAN MYCOPHENOLATE: CPT

## 2023-06-20 PROCEDURE — 80197 ASSAY OF TACROLIMUS: CPT | Performed by: INTERNAL MEDICINE

## 2023-06-20 PROCEDURE — 83036 HEMOGLOBIN GLYCOSYLATED A1C: CPT

## 2023-06-20 PROCEDURE — 84156 ASSAY OF PROTEIN URINE: CPT | Performed by: PATHOLOGY

## 2023-06-20 PROCEDURE — 86665 EPSTEIN-BARR CAPSID VCA: CPT

## 2023-06-20 PROCEDURE — 86360 T CELL ABSOLUTE COUNT/RATIO: CPT

## 2023-06-20 PROCEDURE — 36415 COLL VENOUS BLD VENIPUNCTURE: CPT | Performed by: PATHOLOGY

## 2023-06-20 RX ORDER — PREDNISONE 5 MG/1
7.5 TABLET ORAL DAILY
COMMUNITY
Start: 2023-06-20 | End: 2023-06-26

## 2023-06-20 RX ORDER — SULFAMETHOXAZOLE AND TRIMETHOPRIM 400; 80 MG/1; MG/1
1 TABLET ORAL DAILY
COMMUNITY
End: 2023-07-06

## 2023-06-20 RX ORDER — TACROLIMUS 1 MG/1
2 CAPSULE ORAL 2 TIMES DAILY
Qty: 90 CAPSULE | Refills: 1 | Status: SHIPPED | OUTPATIENT
Start: 2023-06-20 | End: 2023-06-26

## 2023-06-20 RX ORDER — MYCOPHENOLIC ACID 360 MG/1
360 TABLET, DELAYED RELEASE ORAL 2 TIMES DAILY
Qty: 180 TABLET | Refills: 1 | Status: SHIPPED | OUTPATIENT
Start: 2023-06-20 | End: 2023-07-03

## 2023-06-20 RX ORDER — LOSARTAN POTASSIUM 50 MG/1
50 TABLET ORAL DAILY
Status: CANCELLED | OUTPATIENT
Start: 2023-06-20

## 2023-06-20 RX ORDER — METOPROLOL SUCCINATE 50 MG/1
50 TABLET, EXTENDED RELEASE ORAL DAILY
Status: CANCELLED | OUTPATIENT
Start: 2023-06-20

## 2023-06-20 RX ORDER — ATORVASTATIN CALCIUM 40 MG/1
1 TABLET, FILM COATED ORAL DAILY
COMMUNITY
Start: 2023-06-06

## 2023-06-20 RX ORDER — AMLODIPINE BESYLATE 10 MG/1
1 TABLET ORAL DAILY
COMMUNITY
Start: 2023-06-06

## 2023-06-20 RX ORDER — TAMSULOSIN HYDROCHLORIDE 0.4 MG/1
0.4 CAPSULE ORAL DAILY
COMMUNITY
Start: 2023-06-06 | End: 2024-02-01

## 2023-06-20 RX ORDER — ISONIAZID 300 MG/1
300 TABLET ORAL DAILY
Qty: 90 TABLET | Refills: 0 | COMMUNITY
Start: 2023-06-20 | End: 2023-09-26

## 2023-06-20 ASSESSMENT — PAIN SCALES - GENERAL: PAINLEVEL: NO PAIN (0)

## 2023-06-20 NOTE — PROGRESS NOTES
TRANSPLANT NEPHROLOGY CONSULT NOTE    Assessment & Plan   # LDKT: Stable   - Baseline Creatinine  ~ 1.1   - Proteinuria: Normal (<0.2 grams)   - Date DSA Last Checked: Not Known         - BK Viremia: No   - Kidney Tx Biopsy: No      # Immunosuppression: Tacrolimus immediate release (goal 8-10), Mycophenolic acid (dose 360 mg every 12 hours) and Prednisone (dose 7.5 mg daily)   - Continue with intensive monitoring of immunosuppression for efficacy and toxicity.   - Changes: Not at this time, plan to reduce prednisone to 5 mg po every day and adjust MPA to 540 mg po bid next visit    # Infection Prophylaxis:   - PJP: Sulfa/TMP (Bactrim)    # hx of Latent Tb:   - on IZN, prescribed 90 d and has 1 more month left, unclear that he completed 9 month Tx   - refer to ID for recs    # Hypertension: Borderline control;  Goal BP: < 130/80   - Changes: Not at this time monitor home BP readings daily and bring monitor to validate    # Diabetes: Unknown control , check HbA1C   - Management as per Endocrinology and primary care.    # Anemia in Chronic Renal Disease: Hgb: Stable      CAROLYN: No   - Iron studies: Not checked recently    # Mineral Bone Disorder:   - update MBD labs    # Hypokalemia:low nnormal      HypoMg: monitor for now    # Skin Cancer Risk:    - Discussed sun protection and recommend regular follow up with Dermatology.    # Medical Compliance: Yes    # Health Maintenance and Vaccination Review: Follow up with PCP    # Transplant History:  Etiology of Kidney Failure: Diabetic nephropathy  Tx: LDKT  Transplant: 2/1/2023 (Kidney)  Significant changes in immunosuppression: None  Significant transplant-related complications: None      Transplant Office Phone Number: [phone]    Assessment and plan was discussed with the patient and he voiced his understanding and agreement.    Return visit: 2 months for 6 month post transplant visit  Zaid Deng MD      Reason for Consult   Jass Fierro was seen in  consultation at the request of  for kidney transplant and immunosuppression management.    Chief Complaint   Mr. Fierro is a 72 year old here for kidney transplant and immunosuppression management.    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 His post transplant course was reportedly uncomplicated: no infection, no rejection. No kidney biopsy.   He recently moved back to [place] and would like to establish post transplant care.     Other pertinent PMHx: LTBI (1998) s/p only 1 month of IZN ? Incomplete Tx and was prescribed 3 month course to complete    Current IS FK 2/2 /360 prednisone 7.5  PPx: bactrim, no valcyte    Home BP: Not checked    Review of Systems   A comprehensive review of systems was obtained and negative, except as noted in the HPI or PMH.    Problem List   Patient Active Problem List   Diagnosis     Diabetes mellitus, type 2 (H)     Nonspecific reaction to tuberculin skin test without active tuberculosis     Anal fissure     Essential hypertension     Proteinuria     TYPE 2 DIABETES, HBA1C GOAL < 7%     CARDIOVASCULAR SCREENING; LDL GOAL LESS THAN 100     Non-proliferative diabetic retinopathy, both eyes (H)     Pseudophakia, right eye     Cataract, left       Past Medical History    Past Medical History:   Diagnosis Date     Anal fissure      Kidney problem     causes leg swelling, underfunctioning     Non-proliferative diabetic retinopathy, both eyes (H) 9/16/2015     Nonsenile cataract      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     finished 9 months INH 7/03-4/04     Type II or unspecified type diabetes mellitus without mention of complication, not stated as uncontrolled       Unspecified essential hypertension        Past Surgical History   Past Surgical History:   Procedure Laterality Date     CATARACT IOL, RT/LT Right 2012    doctor and clinic unknown to pt     PHACOEMULSIFICATION WITH STANDARD INTRAOCULAR LENS IMPLANT Left 3/1/2019    Procedure: Left Eye Cataract Removal with Intraocular Lens Implant;  Surgeon: Kendal Vazquez MD;  Location:  OR     [facility] NONSPECIFIC PROCEDURE  03/2002    Fistulotomy + partial lat. internal sphincterotomy       Family History   Family History   Problem Relation Age of Onset     Family History Negative Other      Diabetes Other      Hypertension Other      Diabetes Mother      Diabetes Father      Hypertension Father      Hypertension Brother      Cancer No family hx of      Glaucoma No family hx of         Social History   Social History     Tobacco Use     Smoking status: Never   Substance Use Topics     Alcohol use: No     Drug use: No       Allergies   Allergies   Allergen Reactions     Seasonal Allergies Itching       Medications   Current Outpatient Medications   Medication Sig     acetaminophen (TYLENOL) 500 MG tablet Take 1-2 tablets (500-1,000 mg) by mouth every 6 hours as needed for pain     amLODIPine (NORVASC) 5 MG tablet Take 5 mg by mouth daily.     ASPIRIN 81 MG OR TABS ONE DAILY     chlorthalidone (HYGROTEN) 25 MG tablet Take 25 mg by mouth daily.     Cholecalciferol (VITAMIN D) 2000 UNIT CAPS Take 1 capsule by mouth daily.     glimepiride (AMARYL) 2 MG tablet Take 2 mg by mouth 2 times daily.     ketorolac (ACULAR) 0.5 % ophthalmic solution Place 1 drop Into the left eye 4 times daily     linagliptin (TRADJENTA) 5 MG TABS tablet Take 5 mg by mouth daily.     liraglutide (VICTOZA) 18 MG/3ML SOLN Inject  Subcutaneous daily. 0.6mg sq daily once weekly, then 1.2 sq daily     losartan (COZAAR) 50 MG tablet Take 50 mg by mouth daily.     metoprolol (TOPROL-XL) 50 MG 24 hr tablet Take 50 mg by mouth daily. For 90 days     mycophenolic acid  (GENERIC EQUIVALENT) 360 MG EC tablet Take 1 tablet (360 mg) by mouth 2 times daily     prednisoLONE acetate (PRED FORTE) 1 % ophthalmic suspension Place 1 drop Into the left eye 4 times daily     tacrolimus (GENERIC EQUIVALENT) 1 MG capsule Take 2 capsules (2 mg) by mouth 2 times daily     No current facility-administered medications for this visit.     There are no discontinued medications.    Physical Exam   Vital Signs: There were no vitals taken for this visit.    GENERAL APPEARANCE: alert and no distress  HENT: mouth without ulcers or lesions  LYMPHATICS: no cervical or supraclavicular nodes  RESP: lungs clear to auscultation - no rales, rhonchi or wheezes  CV: regular rhythm, normal rate, no rub, no murmur  EDEMA: no LE edema bilaterally  ABDOMEN: soft, nondistended, nontender, bowel sounds normal  MS: extremities normal - no gross deformities noted, no evidence of inflammation in joints, no muscle tenderness  SKIN: no rash  Access: RUE AVF +thrill/bruit    Data         Latest Ref Rng & Units 6/14/2023     1:53 PM 6/14/2023    11:38 AM 6/7/2023     8:23 AM   Renal   Sodium 136 - 145 mmol/L  138   135     K 3.4 - 5.3 mmol/L  4.3   4.2     Cl 98 - 107 mmol/L  102   102     Cl (external) 98 - 107 mmol/L  102   102     CO2 22 - 29 mmol/L  26   20     Urea Nitrogen 8.0 - 23.0 mg/dL  21.1   17.9     Creatinine 0.67 - 1.17 mg/dL  1.17   1.14     Glucose 70 - 99 mg/dL 251   218   263     Calcium 8.8 - 10.2 mg/dL  9.2   9.1           Latest Ref Rng & Units 9/9/2021    12:28 PM 8/6/2021    12:28 PM 5/7/2012     5:32 PM   Bone Health   Phosphorus 2.5 - 4.5 mg/dL  3.5      Parathyroid Hormone Intact 18 - 80 pg/mL 741    133     Vit D Def 20 - 75 ug/L 24             Latest Ref Rng & Units 6/20/2023     7:52 AM 6/14/2023    11:38 AM 5/7/2012     5:32 PM   Heme   WBC 4.0 - 11.0 10e3/uL 7.9   9.6   7.5     Hgb 13.3 - 17.7 g/dL 13.1   13.2   12.5     Plt 150 - 450 10e3/uL 267   222   256           Latest Ref Rng & Units  6/14/2023    11:38 AM 6/7/2023     8:23 AM 8/6/2021    12:28 PM   Liver   AP 40 - 129 U/L 133   140      TBili <=1.2 mg/dL 0.6   0.5      ALT 0 - 70 U/L 26   33      AST 0 - 45 U/L 26   22      Tot Protein 6.4 - 8.3 g/dL 6.9   6.5      Albumin 3.4 - 5.0 g/dL   2.7     Albumin 3.5 - 5.2 g/dL 4.1   3.8            Latest Ref Rng & Units 8/11/2008     9:27 AM 3/3/2008    10:41 AM 8/13/2007     1:07 PM   Pancreas   A1C 4.3 - 6.0 % 14.2   12.7   8.8           Latest Ref Rng & Units 9/9/2021    12:28 PM 8/12/2011     9:49 AM   Iron studies   Iron 35 - 180 ug/dL 37   55     Iron Saturation Index 15 - 46 % 16   21     Ferritin 26 - 388 ng/mL 364   117           8/12/2011     9:49 AM   [facility]   Hep B Surf 327.0          Recent Labs   Lab Test 06/07/23  0804   TACROL 7.0       I spent a total of 85 minutes on the date of the encounter doing chart review, performing a history and physical exam, completing documentation and any further activities as noted above.

## 2023-06-20 NOTE — NURSING NOTE
Chief Complaint   Patient presents with     RECHECK       BP (!) 149/90   Pulse 94   Temp 97.9  F (36.6  C) (Oral)   Wt 86 kg (189 lb 9.6 oz)   SpO2 100%   BMI 29.70 kg/m      Nabil Cook on 6/20/2023 at 8:06 AM

## 2023-06-20 NOTE — TELEPHONE ENCOUNTER
[facility] was able to meet with Jass and his son in clinic.    Med rec completed.    Per Dr. Deng, sent to ID for consult:  was historically treated for latent TB in 1998(?) x1 mo with isoniazid.   Was prescribed a 90d course internationally, post-transplant. Has 1 month supply left as of 6/20/2023, in order to complete.   With current treatment, will need to check LFTs monthly.  Labs per current protocol (sans DSA).    Will need to follow up with PCP for DM management.

## 2023-06-20 NOTE — PATIENT INSTRUCTIONS
ID referral    Labs every other week    Pharmacy    Prednisone wean to 5 and MPA to 540 bid next visit

## 2023-06-20 NOTE — LETTER
6/20/2023         RE: Jass Fierro  [address]  [facility] [address]        Dear Colleague,    Thank you for referring your patient, Jass Fierro, to the [facility]. Please see a copy of my visit note below.    TRANSPLANT NEPHROLOGY CONSULT NOTE    Assessment & Plan   # LDKT: Stable   - Baseline Creatinine  ~ 1.1   - Proteinuria: Normal (<0.2 grams)   - Date DSA Last Checked: Not Known         - BK Viremia: No   - Kidney Tx Biopsy: No      # Immunosuppression: Tacrolimus immediate release (goal 8-10), Mycophenolic acid (dose 360 mg every 12 hours) and Prednisone (dose 7.5 mg daily)   - Continue with intensive monitoring of immunosuppression for efficacy and toxicity.   - Changes: Not at this time, plan to reduce prednisone to 5 mg po every day and adjust MPA to 540 mg po bid next visit    # Infection Prophylaxis:   - PJP: Sulfa/TMP (Bactrim)    # hx of Latent Tb:   - on IZN, prescribed 90 d and has 1 more month left, unclear that he completed 9 month Tx   - refer to ID for recs    # Hypertension: Borderline control;  Goal BP: < 130/80   - Changes: Not at this time monitor home BP readings daily and bring monitor to validate    # Diabetes: Unknown control , check HbA1C   - Management as per Endocrinology and primary care.    # Anemia in Chronic Renal Disease: Hgb: Stable      CAROLYN: No   - Iron studies: Not checked recently    # Mineral Bone Disorder:   - update MBD labs    # Hypokalemia:low nnormal      HypoMg: monitor for now    # Skin Cancer Risk:    - Discussed sun protection and recommend regular follow up with Dermatology.    # Medical Compliance: Yes    # Health Maintenance and Vaccination Review: Follow up with PCP    # Transplant History:  Etiology of Kidney Failure: Diabetic nephropathy  Tx: LDKT  Transplant: 2/1/2023 (Kidney)  Significant changes in immunosuppression: None  Significant transplant-related complications: None      Transplant Office  Phone Number: [phone]    Assessment and plan was discussed with the patient and he voiced his understanding and agreement.    Return visit: 2 months for 6 month post transplant visit  Zaid Deng MD      Reason for Consult   Jass Fierro was seen in consultation at the request of  for kidney transplant and immunosuppression management.    Chief Complaint   Mr. Fierro is a 72 year old here for kidney transplant and immunosuppression management.    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 His post transplant course was reportedly uncomplicated: no infection, no rejection. No kidney biopsy.   He recently moved back to [place] and would like to establish post transplant care.     Other pertinent PMHx: LTBI (1998) s/p only 1 month of IZN ? Incomplete Tx and was prescribed 3 month course to complete    Current IS FK 2/2 /360 prednisone 7.5  PPx: bactrim, no valcyte    Home BP: Not checked    Review of Systems   A comprehensive review of systems was obtained and negative, except as noted in the HPI or PMH.    Problem List   Patient Active Problem List   Diagnosis     Diabetes mellitus, type 2 (H)     Nonspecific reaction to tuberculin skin test without active tuberculosis     Anal fissure     Essential hypertension     Proteinuria     TYPE 2 DIABETES, HBA1C GOAL < 7%     CARDIOVASCULAR SCREENING; LDL GOAL LESS THAN 100     Non-proliferative diabetic retinopathy, both eyes (H)     Pseudophakia, right eye     Cataract, left       Past Medical History    Past Medical History:   Diagnosis Date     Anal fissure      Kidney problem     causes leg swelling, underfunctioning      Non-proliferative diabetic retinopathy, both eyes (H) 9/16/2015     Nonsenile cataract      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     finished 9 months INH 7/03-4/04     Type II or unspecified type diabetes mellitus without mention of complication, not stated as uncontrolled      Unspecified essential hypertension        Past Surgical History   Past Surgical History:   Procedure Laterality Date     CATARACT IOL, RT/LT Right 2012    doctor and clinic unknown to pt     PHACOEMULSIFICATION WITH STANDARD INTRAOCULAR LENS IMPLANT Left 3/1/2019    Procedure: Left Eye Cataract Removal with Intraocular Lens Implant;  Surgeon: Kendal Vazquez MD;  Location:  OR     [facility] NONSPECIFIC PROCEDURE  03/2002    Fistulotomy + partial lat. internal sphincterotomy       Family History   Family History   Problem Relation Age of Onset     Family History Negative Other      Diabetes Other      Hypertension Other      Diabetes Mother      Diabetes Father      Hypertension Father      Hypertension Brother      Cancer No family hx of      Glaucoma No family hx of         Social History   Social History     Tobacco Use     Smoking status: Never   Substance Use Topics     Alcohol use: No     Drug use: No       Allergies   Allergies   Allergen Reactions     Seasonal Allergies Itching       Medications   Current Outpatient Medications   Medication Sig     acetaminophen (TYLENOL) 500 MG tablet Take 1-2 tablets (500-1,000 mg) by mouth every 6 hours as needed for pain     amLODIPine (NORVASC) 5 MG tablet Take 5 mg by mouth daily.     ASPIRIN 81 MG OR TABS ONE DAILY     chlorthalidone (HYGROTEN) 25 MG tablet Take 25 mg by mouth daily.     Cholecalciferol (VITAMIN D) 2000 UNIT CAPS Take 1 capsule by mouth daily.     glimepiride (AMARYL) 2 MG tablet Take 2 mg by mouth 2 times daily.     ketorolac (ACULAR) 0.5 % ophthalmic solution Place 1 drop Into the left eye 4 times daily     linagliptin (TRADJENTA) 5 MG TABS tablet Take 5 mg by mouth daily.     liraglutide  (VICTOZA) 18 MG/3ML SOLN Inject  Subcutaneous daily. 0.6mg sq daily once weekly, then 1.2 sq daily     losartan (COZAAR) 50 MG tablet Take 50 mg by mouth daily.     metoprolol (TOPROL-XL) 50 MG 24 hr tablet Take 50 mg by mouth daily. For 90 days     mycophenolic acid (GENERIC EQUIVALENT) 360 MG EC tablet Take 1 tablet (360 mg) by mouth 2 times daily     prednisoLONE acetate (PRED FORTE) 1 % ophthalmic suspension Place 1 drop Into the left eye 4 times daily     tacrolimus (GENERIC EQUIVALENT) 1 MG capsule Take 2 capsules (2 mg) by mouth 2 times daily     No current facility-administered medications for this visit.     There are no discontinued medications.    Physical Exam   Vital Signs: There were no vitals taken for this visit.    GENERAL APPEARANCE: alert and no distress  HENT: mouth without ulcers or lesions  LYMPHATICS: no cervical or supraclavicular nodes  RESP: lungs clear to auscultation - no rales, rhonchi or wheezes  CV: regular rhythm, normal rate, no rub, no murmur  EDEMA: no LE edema bilaterally  ABDOMEN: soft, nondistended, nontender, bowel sounds normal  MS: extremities normal - no gross deformities noted, no evidence of inflammation in joints, no muscle tenderness  SKIN: no rash  Access: RUE AVF +thrill/bruit    Data         Latest Ref Rng & Units 6/14/2023     1:53 PM 6/14/2023    11:38 AM 6/7/2023     8:23 AM   Renal   Sodium 136 - 145 mmol/L  138   135     K 3.4 - 5.3 mmol/L  4.3   4.2     Cl 98 - 107 mmol/L  102   102     Cl (external) 98 - 107 mmol/L  102   102     CO2 22 - 29 mmol/L  26   20     Urea Nitrogen 8.0 - 23.0 mg/dL  21.1   17.9     Creatinine 0.67 - 1.17 mg/dL  1.17   1.14     Glucose 70 - 99 mg/dL 251   218   263     Calcium 8.8 - 10.2 mg/dL  9.2   9.1           Latest Ref Rng & Units 9/9/2021    12:28 PM 8/6/2021    12:28 PM 5/7/2012     5:32 PM   Bone Health   Phosphorus 2.5 - 4.5 mg/dL  3.5      Parathyroid Hormone Intact 18 - 80 pg/mL 741    133     Vit D Def 20 - 75 ug/L 24              Latest Ref Rng & Units 6/20/2023     7:52 AM 6/14/2023    11:38 AM 5/7/2012     5:32 PM   Heme   WBC 4.0 - 11.0 10e3/uL 7.9   9.6   7.5     Hgb 13.3 - 17.7 g/dL 13.1   13.2   12.5     Plt 150 - 450 10e3/uL 267   222   256           Latest Ref Rng & Units 6/14/2023    11:38 AM 6/7/2023     8:23 AM 8/6/2021    12:28 PM   Liver   AP 40 - 129 U/L 133   140      TBili <=1.2 mg/dL 0.6   0.5      ALT 0 - 70 U/L 26   33      AST 0 - 45 U/L 26   22      Tot Protein 6.4 - 8.3 g/dL 6.9   6.5      Albumin 3.4 - 5.0 g/dL   2.7     Albumin 3.5 - 5.2 g/dL 4.1   3.8            Latest Ref Rng & Units 8/11/2008     9:27 AM 3/3/2008    10:41 AM 8/13/2007     1:07 PM   Pancreas   A1C 4.3 - 6.0 % 14.2   12.7   8.8           Latest Ref Rng & Units 9/9/2021    12:28 PM 8/12/2011     9:49 AM   Iron studies   Iron 35 - 180 ug/dL 37   55     Iron Saturation Index 15 - 46 % 16   21     Ferritin 26 - 388 ng/mL 364   117           8/12/2011     9:49 AM   [facility]   Hep B Surf 327.0          Recent Labs   Lab Test 06/07/23  0804   TACROL 7.0       I spent a total of 85 minutes on the date of the encounter doing chart review, performing a history and physical exam, completing documentation and any further activities as noted above.      duplicate        Again, thank you for allowing me to participate in the care of your patient.        Sincerely,        Zaid Deng MD

## 2023-06-21 ENCOUNTER — TELEPHONE (OUTPATIENT)
Dept: TRANSPLANT | Facility: CLINIC | Age: 73
End: 2023-06-21
Payer: MEDICAID

## 2023-06-21 LAB
BKV DNA # SPEC NAA+PROBE: NOT DETECTED COPIES/ML
HBV DNA SERPL QL NAA+PROBE: NORMAL
HCV RNA SERPL QL NAA+PROBE: NORMAL
HIV1+2 RNA SERPL QL NAA+PROBE: NORMAL

## 2023-06-21 NOTE — TELEPHONE ENCOUNTER
Post Kidney and Pancreas Transplant Team Conference  Date: 6/21/2023  Transplant Coordinator: Stacie Sanchez     Attendees:  [x]  Dr. Gee [x] Stacie Sanchez, RN [x] Shelley Pfeiffer LPN     []  Dr. Chin [x] Jina Robbins RN [] Kate Sandoval LPN   [x]  Dr. Deng [x] Vidhi Medina, ELINA    [x]  Dr. Chaney [] Felicia Oropeza RN [x] Agapito Ramírez, PharmD   [] Dr. Sigala [] Mini Moore, ELINA    [] Dr. Rose [] Bertin Galindo RN    [] Dr. Batres [] Joanna Jeong RN [] Saide Armendariz RN   [] Dr. Estrella [] Mariola Lutz RN    []  Dr. Stanton [] Mignon Mccurdy, RN    [] Dr. Glass [x] Naya Downing RN    [x] Nelli King, KACEY [] Chandrika Hernandez RN        Verbal Plan Read Back:   Will receive update from Dr. Deng's visit yesterday     Routed to RN Coordinator   Shelley Pfeiffer LPN

## 2023-06-21 NOTE — TELEPHONE ENCOUNTER
From: Zaid Jacob MD   Sent: 6/20/2023   8:08 AM CDT   To: ELINA Mendozaily Jos will call him today-he had some issues with check in yesterday and labs done x2 although she spoke to his sister prior to this visit about labs ,....     He will need BP readings sent from last week and this week--high in clinic but unclear if he took any meds. He had the list . Still with on/off diarrhea so could not increase MPA further and ID will decide on stopping nafcillin pending CTa/p.     Cr still above baseline -will f/up prospera, DSA, need 12h FK trough , CT (unsure it was accurate yesterday). He thinks he is hydrated and weight is not down   Discussed possible need for kidney biopsy if not improved on repeat and FK within range     thanks

## 2023-06-23 DIAGNOSIS — Z48.298 AFTERCARE FOLLOWING ORGAN TRANSPLANT: ICD-10-CM

## 2023-06-23 DIAGNOSIS — Z94.0 KIDNEY REPLACED BY TRANSPLANT: Primary | ICD-10-CM

## 2023-06-23 DIAGNOSIS — Z20.828 CONTACT WITH AND (SUSPECTED) EXPOSURE TO OTHER VIRAL COMMUNICABLE DISEASES: ICD-10-CM

## 2023-06-23 DIAGNOSIS — Z79.899 ENCOUNTER FOR LONG-TERM CURRENT USE OF MEDICATION: ICD-10-CM

## 2023-06-23 LAB
MAGNESIUM SERPL-MCNC: 1.6 MG/DL (ref 1.7–2.3)
PHOSPHATE SERPL-MCNC: 4.5 MG/DL (ref 2.5–4.5)
TACROLIMUS BLD-MCNC: 6.4 UG/L (ref 5–15)
TME LAST DOSE: NORMAL H
TME LAST DOSE: NORMAL H

## 2023-06-26 ENCOUNTER — TELEPHONE (OUTPATIENT)
Dept: TRANSPLANT | Facility: CLINIC | Age: 73
End: 2023-06-26
Payer: MEDICAID

## 2023-06-26 DIAGNOSIS — Z48.298 AFTERCARE FOLLOWING ORGAN TRANSPLANT: Primary | ICD-10-CM

## 2023-06-26 RX ORDER — PREDNISONE 5 MG/1
7.5 TABLET ORAL DAILY
Qty: 120 TABLET | Refills: 3 | Status: SHIPPED | OUTPATIENT
Start: 2023-06-26 | End: 2023-07-03

## 2023-06-26 RX ORDER — TACROLIMUS 1 MG/1
3 CAPSULE ORAL 2 TIMES DAILY
Qty: 540 CAPSULE | Refills: 3 | Status: SHIPPED | OUTPATIENT
Start: 2023-06-26 | End: 2023-09-29

## 2023-06-26 NOTE — TELEPHONE ENCOUNTER
Spoke to patient and increased tac dose to 3/3mg BID. Patient needed a few refills of medication--send to his local [facility]. Overall patient is feeling well with no complaints. Discussed diet, water intake and restrictions. Patient will follow up with labs on Friday.

## 2023-06-26 NOTE — TELEPHONE ENCOUNTER
[facility] Call Center    Phone Message    May a detailed message be left on voicemail: yes     Reason for Call: Other: Patient is trying to reach coordinator Stacie. Please call patient back at [phone].    Action Taken: Message routed to:  [facility] SOT    Travel Screening: Not Applicable

## 2023-06-28 DIAGNOSIS — Z48.298 AFTERCARE FOLLOWING ORGAN TRANSPLANT: Primary | ICD-10-CM

## 2023-06-28 NOTE — TELEPHONE ENCOUNTER
Post Kidney and Pancreas Transplant Team Conference  Date: 6/28/2023  Transplant Coordinator: Stacie Sanchez     Attendees:  [x]  Dr. Gee [x] Stacie Sanchez, RN [x] Shelley Pfeiffer LPN     []  Dr. Chin [x] Jina Robbins, ELINA [] Kate Sandoval LPN   []  Dr. Deng [x] Vidhi Medina, ELINA    [x]  Dr. Chaney [] Felicia Oropeza RN [x] Agapito Ramírez, PharmD   [] Dr. Sigala [] Mini Moore, ELINA    [] Dr. Rose [] Bertin Galindo RN    [] Dr. Batres [] Joanna Jeong, ELINA [x] Sadie Armendariz RN   [x] Dr. Estrella [] Mariola Lutz RN    []  Dr. Stanton [] Mignon Mccurdy RN    [] Dr. Glass [] Naya Downing RN    [] Nelli King NP [] Chandrika Hernandez RN        Verbal Plan Read Back:   Change MPA to 540 mg BID  Change Prednisone to 5 mg daily    Routed to RN Coordinator   Shelley Pfeiffer LPN

## 2023-06-30 ENCOUNTER — LAB (OUTPATIENT)
Dept: LAB | Facility: CLINIC | Age: 73
End: 2023-06-30
Payer: COMMERCIAL

## 2023-06-30 ENCOUNTER — TELEPHONE (OUTPATIENT)
Dept: MULTI SPECIALTY CLINIC | Facility: CLINIC | Age: 73
End: 2023-06-30

## 2023-06-30 DIAGNOSIS — Z20.828 CONTACT WITH AND (SUSPECTED) EXPOSURE TO OTHER VIRAL COMMUNICABLE DISEASES: ICD-10-CM

## 2023-06-30 DIAGNOSIS — Z79.899 ENCOUNTER FOR LONG-TERM CURRENT USE OF MEDICATION: ICD-10-CM

## 2023-06-30 DIAGNOSIS — Z48.298 AFTERCARE FOLLOWING ORGAN TRANSPLANT: ICD-10-CM

## 2023-06-30 DIAGNOSIS — Z52.008 UNSPECIFIED DONOR, OTHER BLOOD: ICD-10-CM

## 2023-06-30 DIAGNOSIS — Z94.0 KIDNEY REPLACED BY TRANSPLANT: ICD-10-CM

## 2023-06-30 DIAGNOSIS — Z48.298 AFTERCARE FOLLOWING ORGAN TRANSPLANT: Primary | ICD-10-CM

## 2023-06-30 LAB
ALBUMIN MFR UR ELPH: 7.3 MG/DL
ALBUMIN SERPL BCG-MCNC: 4.2 G/DL (ref 3.5–5.2)
ALP SERPL-CCNC: 138 U/L (ref 40–129)
ALT SERPL W P-5'-P-CCNC: 43 U/L (ref 0–70)
ANION GAP SERPL CALCULATED.3IONS-SCNC: 9 MMOL/L (ref 7–15)
AST SERPL W P-5'-P-CCNC: 31 U/L (ref 0–45)
BILIRUB DIRECT SERPL-MCNC: <0.2 MG/DL (ref 0–0.3)
BILIRUB SERPL-MCNC: 0.4 MG/DL
BUN SERPL-MCNC: 31.8 MG/DL (ref 8–23)
CALCIUM SERPL-MCNC: 9.1 MG/DL (ref 8.8–10.2)
CHLORIDE SERPL-SCNC: 107 MMOL/L (ref 98–107)
CREAT SERPL-MCNC: 1.18 MG/DL (ref 0.67–1.17)
CREAT UR-MCNC: 51 MG/DL
DEPRECATED HCO3 PLAS-SCNC: 22 MMOL/L (ref 22–29)
ERYTHROCYTE [DISTWIDTH] IN BLOOD BY AUTOMATED COUNT: 13.9 % (ref 10–15)
GFR SERPL CREATININE-BSD FRML MDRD: 65 ML/MIN/1.73M2
GLUCOSE SERPL-MCNC: 115 MG/DL (ref 70–99)
HCT VFR BLD AUTO: 39.1 % (ref 40–53)
HGB BLD-MCNC: 12.7 G/DL (ref 13.3–17.7)
MAGNESIUM SERPL-MCNC: 1.7 MG/DL (ref 1.7–2.3)
MCH RBC QN AUTO: 28.9 PG (ref 26.5–33)
MCHC RBC AUTO-ENTMCNC: 32.5 G/DL (ref 31.5–36.5)
MCV RBC AUTO: 89 FL (ref 78–100)
PHOSPHATE SERPL-MCNC: 4 MG/DL (ref 2.5–4.5)
PLATELET # BLD AUTO: 229 10E3/UL (ref 150–450)
POTASSIUM SERPL-SCNC: 4.3 MMOL/L (ref 3.4–5.3)
PROT SERPL-MCNC: 6.8 G/DL (ref 6.4–8.3)
PROT/CREAT 24H UR: 0.14 MG/MG CR (ref 0–0.2)
PTH-INTACT SERPL-MCNC: 184 PG/ML (ref 15–65)
RBC # BLD AUTO: 4.4 10E6/UL (ref 4.4–5.9)
SODIUM SERPL-SCNC: 138 MMOL/L (ref 136–145)
TACROLIMUS BLD-MCNC: 9 UG/L (ref 5–15)
TME LAST DOSE: NORMAL H
TME LAST DOSE: NORMAL H
WBC # BLD AUTO: 7.9 10E3/UL (ref 4–11)

## 2023-06-30 PROCEDURE — 84100 ASSAY OF PHOSPHORUS: CPT | Performed by: PATHOLOGY

## 2023-06-30 PROCEDURE — 36415 COLL VENOUS BLD VENIPUNCTURE: CPT | Performed by: PATHOLOGY

## 2023-06-30 PROCEDURE — 87799 DETECT AGENT NOS DNA QUANT: CPT | Performed by: INTERNAL MEDICINE

## 2023-06-30 PROCEDURE — 82248 BILIRUBIN DIRECT: CPT | Performed by: PATHOLOGY

## 2023-06-30 PROCEDURE — 83735 ASSAY OF MAGNESIUM: CPT | Performed by: PATHOLOGY

## 2023-06-30 PROCEDURE — 86833 HLA CLASS II HIGH DEFIN QUAL: CPT | Performed by: INTERNAL MEDICINE

## 2023-06-30 PROCEDURE — 99000 SPECIMEN HANDLING OFFICE-LAB: CPT | Performed by: PATHOLOGY

## 2023-06-30 PROCEDURE — 85027 COMPLETE CBC AUTOMATED: CPT | Performed by: PATHOLOGY

## 2023-06-30 PROCEDURE — 80053 COMPREHEN METABOLIC PANEL: CPT | Performed by: PATHOLOGY

## 2023-06-30 PROCEDURE — 86832 HLA CLASS I HIGH DEFIN QUAL: CPT | Performed by: INTERNAL MEDICINE

## 2023-06-30 PROCEDURE — 83970 ASSAY OF PARATHORMONE: CPT | Performed by: PATHOLOGY

## 2023-06-30 PROCEDURE — 81382 HLA II TYPING 1 LOC HR: CPT | Mod: XU | Performed by: INTERNAL MEDICINE

## 2023-06-30 PROCEDURE — 84156 ASSAY OF PROTEIN URINE: CPT | Performed by: PATHOLOGY

## 2023-06-30 PROCEDURE — 80197 ASSAY OF TACROLIMUS: CPT | Performed by: INTERNAL MEDICINE

## 2023-07-03 LAB — BKV DNA # SPEC NAA+PROBE: NOT DETECTED COPIES/ML

## 2023-07-03 RX ORDER — MYCOPHENOLIC ACID 180 MG/1
180 TABLET, DELAYED RELEASE ORAL 2 TIMES DAILY
Qty: 60 TABLET | Refills: 11 | Status: SHIPPED | OUTPATIENT
Start: 2023-07-03 | End: 2023-12-28

## 2023-07-03 RX ORDER — MYCOPHENOLIC ACID 360 MG/1
360 TABLET, DELAYED RELEASE ORAL 2 TIMES DAILY
Qty: 60 TABLET | Refills: 11 | Status: SHIPPED | OUTPATIENT
Start: 2023-07-03 | End: 2023-12-28

## 2023-07-03 RX ORDER — PREDNISONE 5 MG/1
5 TABLET ORAL DAILY
Qty: 90 TABLET | Refills: 3 | Status: SHIPPED | OUTPATIENT
Start: 2023-07-03

## 2023-07-06 ENCOUNTER — OFFICE VISIT (OUTPATIENT)
Dept: PHARMACY | Facility: CLINIC | Age: 73
End: 2023-07-06
Attending: INTERNAL MEDICINE
Payer: COMMERCIAL

## 2023-07-06 DIAGNOSIS — E78.5 DYSLIPIDEMIA: ICD-10-CM

## 2023-07-06 DIAGNOSIS — M54.50 LOW BACK PAIN, UNSPECIFIED BACK PAIN LATERALITY, UNSPECIFIED CHRONICITY, UNSPECIFIED WHETHER SCIATICA PRESENT: ICD-10-CM

## 2023-07-06 DIAGNOSIS — I15.1 HTN, KIDNEY TRANSPLANT RELATED: ICD-10-CM

## 2023-07-06 DIAGNOSIS — E11.69 TYPE 2 DIABETES MELLITUS WITH OTHER SPECIFIED COMPLICATION, WITH LONG-TERM CURRENT USE OF INSULIN (H): ICD-10-CM

## 2023-07-06 DIAGNOSIS — Z94.0 KIDNEY TRANSPLANT RECIPIENT: Primary | ICD-10-CM

## 2023-07-06 DIAGNOSIS — Z79.4 TYPE 2 DIABETES MELLITUS WITH OTHER SPECIFIED COMPLICATION, WITH LONG-TERM CURRENT USE OF INSULIN (H): ICD-10-CM

## 2023-07-06 DIAGNOSIS — Z94.0 HTN, KIDNEY TRANSPLANT RELATED: ICD-10-CM

## 2023-07-06 DIAGNOSIS — Z48.298 AFTERCARE FOLLOWING ORGAN TRANSPLANT: Primary | ICD-10-CM

## 2023-07-06 PROCEDURE — 99605 MTMS BY PHARM NP 15 MIN: CPT | Performed by: PHARMACIST

## 2023-07-06 PROCEDURE — 99607 MTMS BY PHARM ADDL 15 MIN: CPT | Performed by: PHARMACIST

## 2023-07-06 RX ORDER — INSULIN ASPART 100 [IU]/ML
13 INJECTION, SOLUTION INTRAVENOUS; SUBCUTANEOUS
COMMUNITY

## 2023-07-06 RX ORDER — SULFAMETHOXAZOLE AND TRIMETHOPRIM 400; 80 MG/1; MG/1
1 TABLET ORAL DAILY
Qty: 90 TABLET | Refills: 3 | Status: SHIPPED | OUTPATIENT
Start: 2023-07-06 | End: 2024-02-15

## 2023-07-06 RX ORDER — SENNOSIDES 8.6 MG
650 CAPSULE ORAL EVERY 8 HOURS PRN
COMMUNITY
End: 2024-02-01

## 2023-07-06 NOTE — LETTER
July 6, 2023  Jass JAZMÍN Sri  [address]  [facility] [address]    Dear Mr. Fierro, [facility]     Thank you for talking with me on Jul 6, 2023 about your health and medications. As a follow-up to our conversation, I have included two documents:      1. Your Recommended To-Do List has steps you should take to get the best results from your medications.  2. Your Medication List will help you keep track of your medications and how to take them.    If you want to talk about these documents, please call Agapito Ramírez RPH at phone: [phone], Monday-Friday 8-4:30pm.    I look forward to working with you and your doctors to make sure your medications work well for you.    Sincerely,  Agapito Ramírez RPH  MTM Pharmacist, [facility]

## 2023-07-06 NOTE — Clinical Note
FYI- I stopped Atorvastatin. Pt complaining of muscle aches in shoulders and thighs. He did not bring in glucose monitor or medications. Will follow-up with him in 2 weeks.

## 2023-07-06 NOTE — PROGRESS NOTES
Medication Therapy Management (MTM) Encounter    ASSESSMENT:                            Medication Adherence/Access: No issues identified    Kidney Transplant:  Pt needs refill on Bactrim, contacted coordinator.     Back Pain: Recommended patient try Acetaminophen and Biofreeze for his back pain OTC.     Hypertension: BP not at goal <140/90. Recommend moving amlodipine to the evening to improve efficacy.     Hyperlipidemia: Pt complains of daily muscle aches in shoulders and thighs, we will hold Atorvastatin for 2 weeks to see if it improves.    Type 2 Diabetes: Pt did not bring glucose monitor today, but A1c was elevated, likely poorly controlled. Will follow-up with patient in 2 weeks.     PLAN:                            1. Start Tylenol Arthritis 650mg 3 TIMES DAILY.   2. Try Biofreeze Gel for muscle pain as wel.   3. Move Amlodipine to the evening.   4. Stop Atorvastatin 40mg for 2 weeks.     Stacie Sanchez...  Patient needs refill for Bactrim S S.     Follow-up: 7/20    SUBJECTIVE/OBJECTIVE:                          Jass Fierro is a 72 year old male coming in for an initial visit. He was referred to me from Dr. Tera Christie. Patient was accompanied by Saida.  (ID# unknown) was used during today's visit. Emir also joins us via phone.     Reason for visit: New to our transplant program. Received a kidney in [place]..    Allergies/ADRs: Reviewed in chart  Past Medical History: Reviewed in chart  Tobacco: He reports that he has never smoked. He does not have any smokeless tobacco history on file.  Alcohol: not currently using    Medication Adherence/Access: Patient uses pill box(es) and has assistance with medication administration: family member, Son William.  Patient takes medications 2 time(s) per day. 8am and 8pm  Per patient, misses medication 0 times per week.   The patient fills medications at [place]: NO, fills medications at [facility] on [address].    Kidney Transplant:  Current  immunosuppressants include Tacrolimus 3mg twice daily and Myfortic 540mg twice daily, Prednisone 5mg daily.  Pt reports Tremors only in right hand.   Transplant date: 2/1/23 in [place].  Estimated Creatinine Clearance: 57.6 mL/min (A) (based on SCr of 1.18 mg/dL (H)).  PJP prophylaxis: Bactrim S S daily  Vascular prophylaxis; Aspirin 81mg daily. Denies gastrointestinal bleed Sx.   Tx Coordinator: Stacie Sanchez Tx MD: Tera Christie, Using Med Card: Yes  Immunizations: annual flu shot 2022; Pneumovax 23:  2022; Prevnar 13: 2018; TDaP:  2018; Shingrix: unknown, HBV: immunity in 2011, COVID: Moderna x1    Back Pain: Pt complains of lower back pain. 5/10 per patient report.     Hypertension:   Amlodipine 10mg every morning  Patient reports no current medication side effects.  BP Readings from Last 3 Encounters:   06/20/23 (!) 149/90   06/14/23 137/71   03/01/19 163/85     Pulse Readings from Last 3 Encounters:   06/20/23 94   06/14/23 100   03/01/19 64     Hyperlipidemia:   Atorvastatin 40mg daily  Patient reports myalgias in thighs and shoulders since transplant  The ASCVD Risk score ([place] DK, et al., 2019) failed to calculate for the following reasons:    The valid total cholesterol range is 130 to 320 mg/dL  Recent Labs   Lab Test 08/06/21  1228   CHOL 100   HDL 27*   LDL 38   TRIG 176*     Type 2 Diabetes:    Novolog 14 units with every meal  Lantus 22 units every evening.  Patient is not experiencing side effects.  Blood sugar monitoring: 3 time(s) daily. Ranges : patient did not bring in his meter today.  Current diabetes symptoms: none  Urine Albumin:   Lab Results   Component Value Date    UMALCR 1,747.25 (H) 08/06/2021      Lab Results   Component Value Date    A1C 8.9 (H) 06/20/2023     Today's Vitals: There were no vitals taken for this visit.  ----------------    I spent 40 minutes with this patient today. All changes were made via collaborative practice agreement with Dr. Tera Christie. A copy of the visit note  was provided to the patient's provider(s).    A summary of these recommendations was sent via [employer].    Agapito Ramírez, PharmD  MTM Pharmacist    Phone: [phone]      Medication Therapy Recommendations  Dyslipidemia    Current Medication: atorvastatin (LIPITOR) 40 MG tablet   Rationale: Undesirable effect - Adverse medication event - Safety   Recommendation: Discontinue Medication   Status: Accepted per CPA         HTN, kidney transplant related    Current Medication: amLODIPine (NORVASC) 10 MG tablet   Rationale: Incorrect administration - Dosage too low - Effectiveness   Recommendation: Change Administration Time   Status: Patient Agreed - Adherence/Education         Low back pain, unspecified back pain laterality, unspecified chronicity, unspecified whether sciatica present    Rationale: Untreated condition - Needs additional medication therapy - Indication   Recommendation: Start Medication - acetaminophen 650 MG CR tablet   Status: Accepted - no CPA Needed          Rationale: Untreated condition - Needs additional medication therapy - Indication   Recommendation: Start Medication - Biofreeze 10 % Crea   Status: Accepted - no CPA Needed

## 2023-07-06 NOTE — PATIENT INSTRUCTIONS
"Recommendations from today's MTM visit:                                                       1. Start Tylenol Arthritis 650mg 3 TIMES DAILY.   2. Try Biofreeze Gel for muscle pain as wel.   3. Move Amlodipine to the evening.   4. Stop Atorvastatin 40mg for 2 weeks.     Follow-up: 7/20 at 11:30 AM, bring in medications, blood sugar monitor, and Emir.     It was great speaking with you today.  I value your experience and would be very thankful for your time in providing feedback in our clinic survey. In the next few days, you may receive an email or text message from [employer] with a link to a survey related to your  clinical pharmacist.\"     To schedule another MTM appointment, please call the clinic directly or you may call the MTM scheduling line at [phone] or toll-free at [phone].     My Clinical Pharmacist's contact information:                                                      Please feel free to contact me with any questions or concerns you have.      Agapito Ramírez, PharmOUMOU  MTM Pharmacist    Phone: [phone]   "

## 2023-07-06 NOTE — LETTER
_  Medication List        Prepared on: 07/06/2023     Bring your Medication List when you go to the doctor, hospital, or   emergency room. And, share it with your family or caregivers.     Note any changes to how you take your medications.  Cross out medications when you no longer use them.    Medication How I take it Why I use it Prescriber   acetaminophen (ACETAMINOPHEN 8 HOUR) 650 MG CR tablet Take 650 mg by mouth every 8 hours as needed for mild pain or fever  Back pain Patient Reported   amLODIPine (NORVASC) 10 MG tablet Take 1 tablet by mouth daily hypertension Patient Reported   ASPIRIN 81 MG OR TABS ONE DAILY DM w/o Complication Type II Nicholas Fletcher MD   atorvastatin (LIPITOR) 40 MG tablet Take 1 tablet by mouth daily  cholesterol Patient Reported   blood glucose monitoring (NO BRAND SPECIFIED) meter device kit Use to test blood sugar 3 times daily or as directed. Aftercare Following Organ Transplant Zaid Christie MD   insulin aspart (NOVOLOG FLEXPEN) 100 UNIT/ML pen Inject 14 Units Subcutaneous 3 times daily (with meals)  Diabetes type 2 Patient Reported   insulin glargine (LANTUS PEN) 100 UNIT/ML pen Inject 22 Units Subcutaneous At Bedtime Diabetes type 2 Patient Reported   isoniazid (NYDRAZID) 300 MG tablet Take 1 tablet (300 mg) by mouth daily antibiotic Zaid Christie MD   mycophenolic acid (GENERIC EQUIVALENT) 180 MG EC tablet Take 1 tablet (180 mg) by mouth 2 times daily Total dose = 540 mg twice per day Aftercare Following Organ Transplant Zaid Christie MD   mycophenolic acid (GENERIC EQUIVALENT) 360 MG EC tablet Take 1 tablet (360 mg) by mouth 2 times daily Total dose = 540 mg twice per day Aftercare Following Organ Transplant Zaid Christie MD   predniSONE (DELTASONE) 5 MG tablet Take 1 tablet (5 mg) by mouth daily Aftercare Following Organ Transplant Zaid Christie MD   sulfamethoxazole-trimethoprim (BACTRIM) 400-80 MG tablet Take 1 tablet by mouth daily  Organ transplant Patient  Reported   tacrolimus (GENERIC EQUIVALENT) 1 MG capsule Take 3 capsules (3 mg) by mouth 2 times daily Aftercare Following Organ Transplant Zaid Christie MD         Add new medications, over-the-counter drugs, herbals, vitamins, or  minerals in the blank rows below.    Medication How I take it Why I use it Prescriber                                      Allergies:      seasonal allergies        Side effects I have had:               Other Information:              My notes and questions:

## 2023-07-06 NOTE — LETTER
"Recommended To-Do List      Prepared on: 07/06/2023       You can get the best results from your medications by completing the items on this \"To-Do List.\"      Bring your To-Do List when you go to your doctor. And, share it with your family or caregivers.    My To-Do List:  What we talked about: What I should do:   An issue with your medication    Stop taking atorvastatin (LIPITOR)          What we talked about: What I should do:   Your medication dosage being too low    Change when you are taking amLODIPine (NORVASC) to the evening          What we talked about: What I should do:   A new medication that may be of benefit to you    Start taking acetaminophen (TYLENOL) ER 650mg 3 TIMES DAILY           What we talked about: What I should do:   A new medication that may be of benefit to you    Start taking Biofreeze gel for your back pain          What we talked about: What I should do:                     "

## 2023-07-07 ENCOUNTER — LAB (OUTPATIENT)
Dept: LAB | Facility: CLINIC | Age: 73
End: 2023-07-07
Attending: INTERNAL MEDICINE
Payer: COMMERCIAL

## 2023-07-07 DIAGNOSIS — Z20.828 CONTACT WITH AND (SUSPECTED) EXPOSURE TO OTHER VIRAL COMMUNICABLE DISEASES: ICD-10-CM

## 2023-07-07 DIAGNOSIS — Z48.298 AFTERCARE FOLLOWING ORGAN TRANSPLANT: ICD-10-CM

## 2023-07-07 DIAGNOSIS — Z94.0 KIDNEY REPLACED BY TRANSPLANT: ICD-10-CM

## 2023-07-07 DIAGNOSIS — Z79.899 ENCOUNTER FOR LONG-TERM CURRENT USE OF MEDICATION: ICD-10-CM

## 2023-07-07 LAB
A*LOCUS SEROLOGIC EQUIVALENT: 68
A*LOCUS: NORMAL
ABTEST METHOD: NORMAL
ANION GAP SERPL CALCULATED.3IONS-SCNC: 9 MMOL/L (ref 7–15)
B*LOCUS SEROLOGIC EQUIVALENT: 51
B*LOCUS: NORMAL
BUN SERPL-MCNC: 21 MG/DL (ref 8–23)
BW-1: NORMAL
C*LOCUS SEROLOGIC EQUIVALENT: 16
C*LOCUS: NORMAL
CALCIUM SERPL-MCNC: 9.3 MG/DL (ref 8.8–10.2)
CHLORIDE SERPL-SCNC: 104 MMOL/L (ref 98–107)
CREAT SERPL-MCNC: 1.14 MG/DL (ref 0.67–1.17)
DEPRECATED HCO3 PLAS-SCNC: 22 MMOL/L (ref 22–29)
DPA1*: NORMAL
DPA1*LOCUS: NORMAL
DPB1*: NORMAL
DPB1*LOCUS NMDP: NORMAL
DPB1*LOCUS: NORMAL
DPB1*NMDP: NORMAL
DQA1*: NORMAL
DQA1*LOCUS: NORMAL
DQB1*: NORMAL
DQB1*LOCUS SEROLOGIC EQUIVALENT: 7
DQB1*LOCUS: NORMAL
DQB1*SEROLOGIC EQUIVALENT: 6
DRB1*: NORMAL
DRB1*LOCUS SEROLOGIC EQUIVALENT: 8
DRB1*LOCUS: NORMAL
DRB1*SEROLOGIC EQUIVALENT: 13
DRB3*LOCUS SEROLOGIC EQUIVALENT: 52
DRB3*LOCUS: NORMAL
DRSSO TEST METHOD: NORMAL
ERYTHROCYTE [DISTWIDTH] IN BLOOD BY AUTOMATED COUNT: 13.9 % (ref 10–15)
GFR SERPL CREATININE-BSD FRML MDRD: 67 ML/MIN/1.73M2
GLUCOSE SERPL-MCNC: 214 MG/DL (ref 70–99)
HCT VFR BLD AUTO: 38.7 % (ref 40–53)
HGB BLD-MCNC: 12.5 G/DL (ref 13.3–17.7)
MAGNESIUM SERPL-MCNC: 1.5 MG/DL (ref 1.7–2.3)
MCH RBC QN AUTO: 29.4 PG (ref 26.5–33)
MCHC RBC AUTO-ENTMCNC: 32.3 G/DL (ref 31.5–36.5)
MCV RBC AUTO: 91 FL (ref 78–100)
PHOSPHATE SERPL-MCNC: 3.5 MG/DL (ref 2.5–4.5)
PLATELET # BLD AUTO: 197 10E3/UL (ref 150–450)
POTASSIUM SERPL-SCNC: 4.4 MMOL/L (ref 3.4–5.3)
RBC # BLD AUTO: 4.25 10E6/UL (ref 4.4–5.9)
SA 1 CELL: NORMAL
SA 1 TEST METHOD: NORMAL
SA 2 CELL: NORMAL
SA 2 TEST METHOD: NORMAL
SA1 HI RISK ABY: NORMAL
SA1 MOD RISK ABY: NORMAL
SA2 HI RISK ABY: NORMAL
SA2 MOD RISK ABY: NORMAL
SODIUM SERPL-SCNC: 135 MMOL/L (ref 136–145)
TACROLIMUS BLD-MCNC: 8.9 UG/L (ref 5–15)
TME LAST DOSE: NORMAL H
TME LAST DOSE: NORMAL H
UNACCEPTABLE ANTIGENS: NORMAL
UNOS CPRA: 4
WBC # BLD AUTO: 7.6 10E3/UL (ref 4–11)
ZZZSA 1  COMMENTS: NORMAL
ZZZSA 2 COMMENTS: NORMAL

## 2023-07-07 PROCEDURE — 87799 DETECT AGENT NOS DNA QUANT: CPT | Performed by: INTERNAL MEDICINE

## 2023-07-07 PROCEDURE — 36415 COLL VENOUS BLD VENIPUNCTURE: CPT | Performed by: PATHOLOGY

## 2023-07-07 PROCEDURE — 83735 ASSAY OF MAGNESIUM: CPT | Performed by: PATHOLOGY

## 2023-07-07 PROCEDURE — 85027 COMPLETE CBC AUTOMATED: CPT | Performed by: PATHOLOGY

## 2023-07-07 PROCEDURE — 84100 ASSAY OF PHOSPHORUS: CPT | Performed by: PATHOLOGY

## 2023-07-07 PROCEDURE — 80048 BASIC METABOLIC PNL TOTAL CA: CPT | Performed by: PATHOLOGY

## 2023-07-07 PROCEDURE — 99000 SPECIMEN HANDLING OFFICE-LAB: CPT | Performed by: PATHOLOGY

## 2023-07-07 PROCEDURE — 80197 ASSAY OF TACROLIMUS: CPT | Performed by: INTERNAL MEDICINE

## 2023-07-10 LAB — BKV DNA # SPEC NAA+PROBE: NOT DETECTED COPIES/ML

## 2023-07-21 ENCOUNTER — LAB (OUTPATIENT)
Dept: LAB | Facility: CLINIC | Age: 73
End: 2023-07-21
Attending: INTERNAL MEDICINE
Payer: COMMERCIAL

## 2023-07-21 DIAGNOSIS — Z79.899 ENCOUNTER FOR LONG-TERM CURRENT USE OF MEDICATION: ICD-10-CM

## 2023-07-21 DIAGNOSIS — Z20.828 CONTACT WITH AND (SUSPECTED) EXPOSURE TO OTHER VIRAL COMMUNICABLE DISEASES: ICD-10-CM

## 2023-07-21 DIAGNOSIS — Z48.298 AFTERCARE FOLLOWING ORGAN TRANSPLANT: ICD-10-CM

## 2023-07-21 DIAGNOSIS — Z94.0 KIDNEY REPLACED BY TRANSPLANT: ICD-10-CM

## 2023-07-21 LAB
ANION GAP SERPL CALCULATED.3IONS-SCNC: 9 MMOL/L (ref 7–15)
BUN SERPL-MCNC: 28.7 MG/DL (ref 8–23)
CALCIUM SERPL-MCNC: 9.8 MG/DL (ref 8.8–10.2)
CHLORIDE SERPL-SCNC: 105 MMOL/L (ref 98–107)
CREAT SERPL-MCNC: 1.28 MG/DL (ref 0.67–1.17)
DEPRECATED HCO3 PLAS-SCNC: 25 MMOL/L (ref 22–29)
ERYTHROCYTE [DISTWIDTH] IN BLOOD BY AUTOMATED COUNT: 13.8 % (ref 10–15)
GFR SERPL CREATININE-BSD FRML MDRD: 59 ML/MIN/1.73M2
GLUCOSE SERPL-MCNC: 101 MG/DL (ref 70–99)
HCT VFR BLD AUTO: 40.6 % (ref 40–53)
HGB BLD-MCNC: 13.1 G/DL (ref 13.3–17.7)
MAGNESIUM SERPL-MCNC: 1.5 MG/DL (ref 1.7–2.3)
MCH RBC QN AUTO: 29.2 PG (ref 26.5–33)
MCHC RBC AUTO-ENTMCNC: 32.3 G/DL (ref 31.5–36.5)
MCV RBC AUTO: 90 FL (ref 78–100)
PHOSPHATE SERPL-MCNC: 3.5 MG/DL (ref 2.5–4.5)
PLATELET # BLD AUTO: 214 10E3/UL (ref 150–450)
POTASSIUM SERPL-SCNC: 4.1 MMOL/L (ref 3.4–5.3)
RBC # BLD AUTO: 4.49 10E6/UL (ref 4.4–5.9)
SODIUM SERPL-SCNC: 139 MMOL/L (ref 136–145)
TACROLIMUS BLD-MCNC: 8.9 UG/L (ref 5–15)
TME LAST DOSE: NORMAL H
TME LAST DOSE: NORMAL H
WBC # BLD AUTO: 7.1 10E3/UL (ref 4–11)

## 2023-07-21 PROCEDURE — 80197 ASSAY OF TACROLIMUS: CPT | Performed by: INTERNAL MEDICINE

## 2023-07-21 PROCEDURE — 84100 ASSAY OF PHOSPHORUS: CPT | Performed by: PATHOLOGY

## 2023-07-21 PROCEDURE — 87799 DETECT AGENT NOS DNA QUANT: CPT | Performed by: INTERNAL MEDICINE

## 2023-07-21 PROCEDURE — 36415 COLL VENOUS BLD VENIPUNCTURE: CPT | Performed by: PATHOLOGY

## 2023-07-21 PROCEDURE — 85027 COMPLETE CBC AUTOMATED: CPT | Performed by: PATHOLOGY

## 2023-07-21 PROCEDURE — 99000 SPECIMEN HANDLING OFFICE-LAB: CPT | Performed by: PATHOLOGY

## 2023-07-21 PROCEDURE — 80048 BASIC METABOLIC PNL TOTAL CA: CPT | Performed by: PATHOLOGY

## 2023-07-21 PROCEDURE — 83735 ASSAY OF MAGNESIUM: CPT | Performed by: PATHOLOGY

## 2023-07-24 LAB — BKV DNA # SPEC NAA+PROBE: NOT DETECTED COPIES/ML

## 2023-08-15 ENCOUNTER — OFFICE VISIT (OUTPATIENT)
Dept: TRANSPLANT | Facility: CLINIC | Age: 73
End: 2023-08-15
Attending: INTERNAL MEDICINE
Payer: COMMERCIAL

## 2023-08-15 ENCOUNTER — LAB (OUTPATIENT)
Dept: LAB | Facility: CLINIC | Age: 73
End: 2023-08-15
Attending: INTERNAL MEDICINE
Payer: COMMERCIAL

## 2023-08-15 VITALS
BODY MASS INDEX: 31.48 KG/M2 | WEIGHT: 201 LBS | SYSTOLIC BLOOD PRESSURE: 132 MMHG | TEMPERATURE: 98.4 F | OXYGEN SATURATION: 100 % | DIASTOLIC BLOOD PRESSURE: 76 MMHG | HEART RATE: 89 BPM

## 2023-08-15 DIAGNOSIS — Z79.899 ENCOUNTER FOR LONG-TERM CURRENT USE OF MEDICATION: ICD-10-CM

## 2023-08-15 DIAGNOSIS — E11.21 TYPE 2 DIABETES MELLITUS WITH DIABETIC NEPHROPATHY, UNSPECIFIED WHETHER LONG TERM INSULIN USE (H): ICD-10-CM

## 2023-08-15 DIAGNOSIS — Z48.298 AFTERCARE FOLLOWING ORGAN TRANSPLANT: Primary | ICD-10-CM

## 2023-08-15 DIAGNOSIS — I15.1 HTN, KIDNEY TRANSPLANT RELATED: ICD-10-CM

## 2023-08-15 DIAGNOSIS — Z29.89 NEED FOR PNEUMOCYSTIS PROPHYLAXIS: ICD-10-CM

## 2023-08-15 DIAGNOSIS — Z20.828 CONTACT WITH AND (SUSPECTED) EXPOSURE TO OTHER VIRAL COMMUNICABLE DISEASES: ICD-10-CM

## 2023-08-15 DIAGNOSIS — Z94.0 HTN, KIDNEY TRANSPLANT RELATED: ICD-10-CM

## 2023-08-15 DIAGNOSIS — Z94.0 KIDNEY REPLACED BY TRANSPLANT: ICD-10-CM

## 2023-08-15 DIAGNOSIS — Z48.298 AFTERCARE FOLLOWING ORGAN TRANSPLANT: ICD-10-CM

## 2023-08-15 DIAGNOSIS — D84.9 IMMUNOSUPPRESSION (H): ICD-10-CM

## 2023-08-15 LAB
ALBUMIN MFR UR ELPH: 7.3 MG/DL
ALBUMIN SERPL BCG-MCNC: 4.1 G/DL (ref 3.5–5.2)
ALP SERPL-CCNC: 128 U/L (ref 40–129)
ALT SERPL W P-5'-P-CCNC: 42 U/L (ref 0–70)
ANION GAP SERPL CALCULATED.3IONS-SCNC: 8 MMOL/L (ref 7–15)
AST SERPL W P-5'-P-CCNC: 31 U/L (ref 0–45)
BILIRUB DIRECT SERPL-MCNC: 0.2 MG/DL (ref 0–0.3)
BILIRUB SERPL-MCNC: 0.6 MG/DL
BUN SERPL-MCNC: 23.7 MG/DL (ref 8–23)
CALCIUM SERPL-MCNC: 9.3 MG/DL (ref 8.8–10.2)
CHLORIDE SERPL-SCNC: 103 MMOL/L (ref 98–107)
CHOLEST SERPL-MCNC: 112 MG/DL
CREAT SERPL-MCNC: 1.24 MG/DL (ref 0.67–1.17)
CREAT UR-MCNC: 74.2 MG/DL
DEPRECATED HCO3 PLAS-SCNC: 24 MMOL/L (ref 22–29)
ERYTHROCYTE [DISTWIDTH] IN BLOOD BY AUTOMATED COUNT: 12.9 % (ref 10–15)
GFR SERPL CREATININE-BSD FRML MDRD: 61 ML/MIN/1.73M2
GLUCOSE SERPL-MCNC: 204 MG/DL (ref 70–99)
HBA1C MFR BLD: 7.8 %
HCT VFR BLD AUTO: 39.2 % (ref 40–53)
HDLC SERPL-MCNC: 66 MG/DL
HGB BLD-MCNC: 13 G/DL (ref 13.3–17.7)
LDLC SERPL CALC-MCNC: 34 MG/DL
MAGNESIUM SERPL-MCNC: 1.5 MG/DL (ref 1.7–2.3)
MCH RBC QN AUTO: 29.3 PG (ref 26.5–33)
MCHC RBC AUTO-ENTMCNC: 33.2 G/DL (ref 31.5–36.5)
MCV RBC AUTO: 88 FL (ref 78–100)
NONHDLC SERPL-MCNC: 46 MG/DL
PHOSPHATE SERPL-MCNC: 3.5 MG/DL (ref 2.5–4.5)
PLATELET # BLD AUTO: 218 10E3/UL (ref 150–450)
POTASSIUM SERPL-SCNC: 4.3 MMOL/L (ref 3.4–5.3)
PROT SERPL-MCNC: 6.6 G/DL (ref 6.4–8.3)
PROT/CREAT 24H UR: 0.1 MG/MG CR (ref 0–0.2)
RBC # BLD AUTO: 4.44 10E6/UL (ref 4.4–5.9)
SODIUM SERPL-SCNC: 135 MMOL/L (ref 136–145)
TACROLIMUS BLD-MCNC: 8.6 UG/L (ref 5–15)
TME LAST DOSE: NORMAL H
TME LAST DOSE: NORMAL H
TRIGL SERPL-MCNC: 61 MG/DL
URATE SERPL-MCNC: 5.1 MG/DL (ref 3.4–7)
WBC # BLD AUTO: 4.8 10E3/UL (ref 4–11)

## 2023-08-15 PROCEDURE — 87799 DETECT AGENT NOS DNA QUANT: CPT | Performed by: INTERNAL MEDICINE

## 2023-08-15 PROCEDURE — 84100 ASSAY OF PHOSPHORUS: CPT | Performed by: PATHOLOGY

## 2023-08-15 PROCEDURE — 83036 HEMOGLOBIN GLYCOSYLATED A1C: CPT | Performed by: INTERNAL MEDICINE

## 2023-08-15 PROCEDURE — 84550 ASSAY OF BLOOD/URIC ACID: CPT | Performed by: PATHOLOGY

## 2023-08-15 PROCEDURE — G0463 HOSPITAL OUTPT CLINIC VISIT: HCPCS | Performed by: INTERNAL MEDICINE

## 2023-08-15 PROCEDURE — 99000 SPECIMEN HANDLING OFFICE-LAB: CPT | Performed by: PATHOLOGY

## 2023-08-15 PROCEDURE — 80197 ASSAY OF TACROLIMUS: CPT | Performed by: INTERNAL MEDICINE

## 2023-08-15 PROCEDURE — 84156 ASSAY OF PROTEIN URINE: CPT | Performed by: PATHOLOGY

## 2023-08-15 PROCEDURE — 99214 OFFICE O/P EST MOD 30 MIN: CPT | Performed by: INTERNAL MEDICINE

## 2023-08-15 PROCEDURE — 36415 COLL VENOUS BLD VENIPUNCTURE: CPT | Performed by: PATHOLOGY

## 2023-08-15 PROCEDURE — 83735 ASSAY OF MAGNESIUM: CPT | Performed by: PATHOLOGY

## 2023-08-15 PROCEDURE — 85027 COMPLETE CBC AUTOMATED: CPT | Performed by: PATHOLOGY

## 2023-08-15 PROCEDURE — 80061 LIPID PANEL: CPT | Performed by: PATHOLOGY

## 2023-08-15 PROCEDURE — 82248 BILIRUBIN DIRECT: CPT | Performed by: PATHOLOGY

## 2023-08-15 PROCEDURE — 80053 COMPREHEN METABOLIC PANEL: CPT | Performed by: PATHOLOGY

## 2023-08-15 ASSESSMENT — PAIN SCALES - GENERAL: PAINLEVEL: NO PAIN (0)

## 2023-08-15 NOTE — NURSING NOTE
Chief Complaint   Patient presents with    RECHECK     4 mo f/u       /76   Pulse 89   Temp 98.4  F (36.9  C) (Oral)   Wt 91.2 kg (201 lb)   SpO2 100%   BMI 31.48 kg/m      Nabil Cook on 8/15/2023 at 7:52 AM

## 2023-08-15 NOTE — PATIENT INSTRUCTIONS
Bring your med list and pill bottles to every appointment    Transplant Patient Information  Your Post Transplant Coordinator is: Stacie Sanchez  You and your care team can contact your transplant coordinator Monday - Friday, 8am - 5pm at [phone] (Option 2 to reach the coordinator or Option 4 to schedule an appointment).  You can also reach your care team online via [employer].  After hours for urgent matters, please call [facility] at [phone].

## 2023-08-15 NOTE — PROGRESS NOTES
TRANSPLANT NEPHROLOGY CONSULT NOTE    Assessment & Plan   # LDKT: Stable   - Baseline Creatinine  ~ 1.1-1.2   - Proteinuria: Normal (<0.2 grams)   - Date DSA Last Checked: Not Known         - BK Viremia: No   - Kidney Tx Biopsy: No    # Immunosuppression: Tacrolimus immediate release (goal 8-10), Mycophenolic acid (dose 360 mg every 12 hours) and Prednisone (dose 7.5 mg daily)   - Continue with intensive monitoring of immunosuppression for efficacy and toxicity.  - Changes: Not at this time,   - plan to reduce prednisone to 5 mg po every day and adjust MPA to 540 mg po bid next visit, would not make changes till at least he brings the med list and pill bottles, discussed the importance of bringing his med list and pill bottles and keeping his appointment with the pharmacist    # Infection Prophylaxis:   - PJP: Sulfa/TMP (Bactrim)    # hx of Latent Tb:   - on IZN, per his son who assists in med setup, he was prescribed 90 d and has 1 more month left, unclear if he completed 9 month Tx   - referred to ID     # Hypertension:acceptable control Goal BP: < 130/80   - Changes: Not at this time     # Diabetes: poor control, HbA1C:8.9%   - Management as per Endocrinology and primary care.    # Anemia in Chronic Renal Disease: Hgb: Stable      CAROLYN: No   - Iron studies: Not checked recently    # Mineral Bone Disorder:   -secondary hyperparathyroidism, vit D deficiency-16: when he brings his med list and pill bottles, will confirm he is taking vit D3 2000 international unit(s) qd    # Skin Cancer Risk:    - Discussed sun protection and recommend regular follow up with Dermatology.    # Medical Compliance: Yes    # Health Maintenance and Vaccination Review: Follow up with PCP    # Transplant History:  Etiology of Kidney Failure: Diabetic nephropathy  Tx: LDKT  Transplant: 2/1/2023 (Kidney)  Significant changes in immunosuppression: None  Significant transplant-related complications: None      Transplant Office Phone Number:  [phone]    Assessment and plan was discussed with the patient and he voiced his understanding and agreement.    Return visit: 3 months     Zaid Deng MD      Chief Complaint   Mr. Fierro is a 72 year old here for kidney transplant and immunosuppression management.    History of Present Illness     Feels good  Energy level is good. Denies any fevers, chills, weight loss, night sweats. No nausea, vomiting, abdominal pain, diarrhea. No chest pain, sob, leg swelling. No graft pain or urinary symptoms  Sugars better controlled  , repeat HbA1c pending today, following with PCP  Forgot to bring his med list, doesn't know the names of his meds, his son assists him in setting up his meds    Current IS FK 2/2 /360 prednisone 7.5  PPx: bactrim, no valcyte    Home BP: Not checked, doesn't have a BP monitor    Review of Systems   A comprehensive review of systems was obtained and negative, except as noted in the HPI or PMH.    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 tuberculosis    Anal fissure    Essential hypertension    Proteinuria    TYPE 2 DIABETES, HBA1C GOAL < 7%    CARDIOVASCULAR SCREENING; LDL GOAL LESS THAN 100    Non-proliferative diabetic retinopathy, both eyes (H)    Pseudophakia, right eye    Cataract, left       Past Medical History    Past Medical History:   Diagnosis Date    Anal fissure     Kidney problem     causes leg swelling, underfunctioning    Non-proliferative diabetic retinopathy, both eyes (H) 9/16/2015    Nonsenile cataract     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     finished 9 months INH 7/03-4/04    Type II or unspecified type diabetes mellitus without mention of complication, not stated as uncontrolled     Unspecified essential hypertension        Past Surgical History   Past Surgical History:   Procedure Laterality Date    CATARACT IOL, RT/LT Right 2012    doctor and clinic  unknown to pt    PHACOEMULSIFICATION WITH STANDARD INTRAOCULAR LENS IMPLANT Left 3/1/2019    Procedure: Left Eye Cataract Removal with Intraocular Lens Implant;  Surgeon: Kendal Vazquez MD;  Location:  OR    [facility] NONSPECIFIC PROCEDURE  03/2002    Fistulotomy + partial lat. internal sphincterotomy       Family History   Family History   Problem Relation Age of Onset    Family History Negative Other     Diabetes Other     Hypertension Other     Diabetes Mother     Diabetes Father     Hypertension Father     Hypertension Brother     Cancer No family hx of     Glaucoma No family hx of         Social History   Social History     Tobacco Use    Smoking status: Never   Substance Use Topics    Alcohol use: No    Drug use: No       Allergies   Allergies   Allergen Reactions    Seasonal Allergies Itching       Medications   Current Outpatient Medications   Medication Sig    acetaminophen (ACETAMINOPHEN 8 HOUR) 650 MG CR tablet Take 650 mg by mouth every 8 hours as needed for mild pain or fever    amLODIPine (NORVASC) 10 MG tablet Take 1 tablet by mouth daily    ASPIRIN 81 MG OR TABS ONE DAILY    atorvastatin (LIPITOR) 40 MG tablet Take 1 tablet by mouth daily    blood glucose monitoring (NO BRAND SPECIFIED) meter device kit Use to test blood sugar 3 times daily or as directed.    insulin aspart (NOVOLOG FLEXPEN) 100 UNIT/ML pen Inject 14 Units Subcutaneous 3 times daily (with meals)    insulin glargine (LANTUS PEN) 100 UNIT/ML pen Inject 22 Units Subcutaneous At Bedtime    isoniazid (NYDRAZID) 300 MG tablet Take 1 tablet (300 mg) by mouth daily    mycophenolic acid (GENERIC EQUIVALENT) 180 MG EC tablet Take 1 tablet (180 mg) by mouth 2 times daily Total dose = 540 mg twice per day    mycophenolic acid (GENERIC EQUIVALENT) 360 MG EC tablet Take 1 tablet (360 mg) by mouth 2 times daily Total dose = 540 mg twice per day    predniSONE (DELTASONE) 5 MG tablet Take 1 tablet (5 mg) by mouth daily    sulfamethoxazole-trimethoprim  (BACTRIM) 400-80 MG tablet Take 1 tablet by mouth daily    tacrolimus (GENERIC EQUIVALENT) 1 MG capsule Take 3 capsules (3 mg) by mouth 2 times daily    tamsulosin (FLOMAX) 0.4 MG capsule Take 0.4 mg by mouth daily     No current facility-administered medications for this visit.     There are no discontinued medications.    Physical Exam   Vital Signs: /76   Pulse 89   Temp 98.4  F (36.9  C) (Oral)   Wt 91.2 kg (201 lb)   SpO2 100%   BMI 31.48 kg/m      GENERAL APPEARANCE: alert and no distress  HENT: mouth without ulcers or lesions  LYMPHATICS: no cervical or supraclavicular nodes  RESP: lungs clear to auscultation - no rales, rhonchi or wheezes  CV: regular rhythm, normal rate, no rub, no murmur  EDEMA: no LE edema bilaterally  ABDOMEN: soft, nondistended, nontender, bowel sounds normal  MS: extremities normal - no gross deformities noted, no evidence of inflammation in joints, no muscle tenderness  SKIN: no rash  Access: RUE AVF +thrill/bruit    Data         Latest Ref Rng & Units 7/21/2023     7:56 AM 7/7/2023     7:50 AM 6/30/2023     8:27 AM   Renal   Sodium 136 - 145 mmol/L 139  135  138    K 3.4 - 5.3 mmol/L 4.1  4.4  4.3    Cl 98 - 107 mmol/L 105  104  107    Cl (external) 98 - 107 mmol/L 105  104  107    CO2 22 - 29 mmol/L 25  22  22    Urea Nitrogen 8.0 - 23.0 mg/dL 28.7  21.0  31.8    Creatinine 0.67 - 1.17 mg/dL 1.28  1.14  1.18    Glucose 70 - 99 mg/dL 101  214  115    Calcium 8.8 - 10.2 mg/dL 9.8  9.3  9.1    Magnesium 1.7 - 2.3 mg/dL 1.5  1.5  1.7          Latest Ref Rng & Units 7/21/2023     7:56 AM 7/7/2023     7:50 AM 6/30/2023     8:27 AM   Bone Health   Phosphorus 2.5 - 4.5 mg/dL 3.5  3.5  4.0    Parathyroid Hormone Intact 15 - 65 pg/mL   184          Latest Ref Rng & Units 8/15/2023     7:40 AM 7/21/2023     7:56 AM 7/7/2023     7:50 AM   Heme   WBC 4.0 - 11.0 10e3/uL 4.8  7.1  7.6    Hgb 13.3 - 17.7 g/dL 13.0  13.1  12.5    Plt 150 - 450 10e3/uL 218  214  197          Latest Ref Rng  & Units 6/30/2023     8:27 AM 6/14/2023    11:38 AM 6/7/2023     8:23 AM   Liver   AP 40 - 129 U/L 138  133  140    TBili <=1.2 mg/dL 0.4  0.6  0.5    Bilirubin Direct 0.00 - 0.30 mg/dL <0.20      ALT 0 - 70 U/L 43  26  33    AST 0 - 45 U/L 31  26  22    Tot Protein 6.4 - 8.3 g/dL 6.8  6.9  6.5    Albumin 3.5 - 5.2 g/dL 4.2  4.1  3.8          Latest Ref Rng & Units 6/20/2023     7:52 AM 8/11/2008     9:27 AM 3/3/2008    10:41 AM   Pancreas   A1C <5.7 % 8.9  14.2  12.7          Latest Ref Rng & Units 9/9/2021    12:28 PM 8/12/2011     9:49 AM   Iron studies   Iron 35 - 180 ug/dL 37  55    Iron Saturation Index 15 - 46 % 16  21    Ferritin 26 - 388 ng/mL 364  117          Latest Ref Rng & Units 6/20/2023     7:52 AM 8/12/2011     9:49 AM   [facility] Txp Virology   EBV CAPSID ANTIBODY IGG No detectable antibody. Positive     Hep B Surf   327.0        Recent Labs   Lab Test 06/20/23  0752 06/30/23  0827 07/07/23  0750 07/21/23  0756   DOSTAC 6/19/2023 6/29/2023  --  7/20/2023   TACROL 6.4  6.5 9.0 8.9 8.9     Recent Labs   Lab Test 06/20/23  0752   DOSMPA 6/19/2023   7:00 PM   MPACID 0.92*   MPAG 32.9

## 2023-08-15 NOTE — LETTER
8/15/2023         RE: Jass Fierro  [address]  [facility] [address]        Dear Colleague,    Thank you for referring your patient, Jass Fierro, to the [facility]. Please see a copy of my visit note below.    TRANSPLANT NEPHROLOGY CONSULT NOTE    Assessment & Plan  # LDKT: Stable   - Baseline Creatinine  ~ 1.1-1.2   - Proteinuria: Normal (<0.2 grams)   - Date DSA Last Checked: Not Known         - BK Viremia: No   - Kidney Tx Biopsy: No    # Immunosuppression: Tacrolimus immediate release (goal 8-10), Mycophenolic acid (dose 360 mg every 12 hours) and Prednisone (dose 7.5 mg daily)   - Continue with intensive monitoring of immunosuppression for efficacy and toxicity.  - Changes: Not at this time,   - plan to reduce prednisone to 5 mg po every day and adjust MPA to 540 mg po bid next visit, would not make changes till at least he brings the med list and pill bottles, discussed the importance of bringing his med list and pill bottles and keeping his appointment with the pharmacist    # Infection Prophylaxis:   - PJP: Sulfa/TMP (Bactrim)    # hx of Latent Tb:   - on IZN, per his son who assists in med setup, he was prescribed 90 d and has 1 more month left, unclear if he completed 9 month Tx   - referred to ID     # Hypertension:acceptable control Goal BP: < 130/80   - Changes: Not at this time     # Diabetes: poor control, HbA1C:8.9%   - Management as per Endocrinology and primary care.    # Anemia in Chronic Renal Disease: Hgb: Stable      CAROLYN: No   - Iron studies: Not checked recently    # Mineral Bone Disorder:   -secondary hyperparathyroidism, vit D deficiency-16: when he brings his med list and pill bottles, will confirm he is taking vit D3 2000 international unit(s) qd    # Skin Cancer Risk:    - Discussed sun protection and recommend regular follow up with Dermatology.    # Medical Compliance: Yes    # Health Maintenance and Vaccination Review:  Follow up with PCP    # Transplant History:  Etiology of Kidney Failure: Diabetic nephropathy  Tx: LDKT  Transplant: 2/1/2023 (Kidney)  Significant changes in immunosuppression: None  Significant transplant-related complications: None      Transplant Office Phone Number: [phone]    Assessment and plan was discussed with the patient and he voiced his understanding and agreement.    Return visit: 3 months     Zaid Deng MD      Chief Complaint  Mr. Fierro is a 72 year old here for kidney transplant and immunosuppression management.    History of Present Illness    Feels good  Energy level is good. Denies any fevers, chills, weight loss, night sweats. No nausea, vomiting, abdominal pain, diarrhea. No chest pain, sob, leg swelling. No graft pain or urinary symptoms  Sugars better controlled  , repeat HbA1c pending today, following with PCP  Forgot to bring his med list, doesn't know the names of his meds, his son assists him in setting up his meds    Current IS FK 2/2 /360 prednisone 7.5  PPx: bactrim, no valcyte    Home BP: Not checked, doesn't have a BP monitor    Review of Systems  A comprehensive review of systems was obtained and negative, except as noted in the HPI or PMH.    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 tuberculosis    Anal fissure    Essential hypertension    Proteinuria    TYPE 2 DIABETES, HBA1C GOAL < 7%    CARDIOVASCULAR SCREENING; LDL GOAL LESS THAN 100    Non-proliferative diabetic retinopathy, both eyes (H)    Pseudophakia, right eye    Cataract, left       Past Medical History   Past Medical History:   Diagnosis Date    Anal fissure     Kidney problem     causes leg swelling, underfunctioning    Non-proliferative diabetic retinopathy, both eyes (H) 9/16/2015    Nonsenile cataract     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     finished 9 months INH 7/03-4/04    Type II or  unspecified type diabetes mellitus without mention of complication, not stated as uncontrolled     Unspecified essential hypertension        Past Surgical History  Past Surgical History:   Procedure Laterality Date    CATARACT IOL, RT/LT Right 2012    doctor and clinic unknown to pt    PHACOEMULSIFICATION WITH STANDARD INTRAOCULAR LENS IMPLANT Left 3/1/2019    Procedure: Left Eye Cataract Removal with Intraocular Lens Implant;  Surgeon: Kendal Vazquez MD;  Location:  OR    [facility] NONSPECIFIC PROCEDURE  03/2002    Fistulotomy + partial lat. internal sphincterotomy       Family History  Family History   Problem Relation Age of Onset    Family History Negative Other     Diabetes Other     Hypertension Other     Diabetes Mother     Diabetes Father     Hypertension Father     Hypertension Brother     Cancer No family hx of     Glaucoma No family hx of         Social History  Social History     Tobacco Use    Smoking status: Never   Substance Use Topics    Alcohol use: No    Drug use: No       Allergies  Allergies   Allergen Reactions    Seasonal Allergies Itching       Medications  Current Outpatient Medications   Medication Sig    acetaminophen (ACETAMINOPHEN 8 HOUR) 650 MG CR tablet Take 650 mg by mouth every 8 hours as needed for mild pain or fever    amLODIPine (NORVASC) 10 MG tablet Take 1 tablet by mouth daily    ASPIRIN 81 MG OR TABS ONE DAILY    atorvastatin (LIPITOR) 40 MG tablet Take 1 tablet by mouth daily    blood glucose monitoring (NO BRAND SPECIFIED) meter device kit Use to test blood sugar 3 times daily or as directed.    insulin aspart (NOVOLOG FLEXPEN) 100 UNIT/ML pen Inject 14 Units Subcutaneous 3 times daily (with meals)    insulin glargine (LANTUS PEN) 100 UNIT/ML pen Inject 22 Units Subcutaneous At Bedtime    isoniazid (NYDRAZID) 300 MG tablet Take 1 tablet (300 mg) by mouth daily    mycophenolic acid (GENERIC EQUIVALENT) 180 MG EC tablet Take 1 tablet (180 mg) by mouth 2 times daily Total dose =  540 mg twice per day    mycophenolic acid (GENERIC EQUIVALENT) 360 MG EC tablet Take 1 tablet (360 mg) by mouth 2 times daily Total dose = 540 mg twice per day    predniSONE (DELTASONE) 5 MG tablet Take 1 tablet (5 mg) by mouth daily    sulfamethoxazole-trimethoprim (BACTRIM) 400-80 MG tablet Take 1 tablet by mouth daily    tacrolimus (GENERIC EQUIVALENT) 1 MG capsule Take 3 capsules (3 mg) by mouth 2 times daily    tamsulosin (FLOMAX) 0.4 MG capsule Take 0.4 mg by mouth daily     No current facility-administered medications for this visit.     There are no discontinued medications.    Physical Exam  Vital Signs: /76   Pulse 89   Temp 98.4  F (36.9  C) (Oral)   Wt 91.2 kg (201 lb)   SpO2 100%   BMI 31.48 kg/m      GENERAL APPEARANCE: alert and no distress  HENT: mouth without ulcers or lesions  LYMPHATICS: no cervical or supraclavicular nodes  RESP: lungs clear to auscultation - no rales, rhonchi or wheezes  CV: regular rhythm, normal rate, no rub, no murmur  EDEMA: no LE edema bilaterally  ABDOMEN: soft, nondistended, nontender, bowel sounds normal  MS: extremities normal - no gross deformities noted, no evidence of inflammation in joints, no muscle tenderness  SKIN: no rash  Access: RUE AVF +thrill/bruit    Data        Latest Ref Rng & Units 7/21/2023     7:56 AM 7/7/2023     7:50 AM 6/30/2023     8:27 AM   Renal   Sodium 136 - 145 mmol/L 139  135  138    K 3.4 - 5.3 mmol/L 4.1  4.4  4.3    Cl 98 - 107 mmol/L 105  104  107    Cl (external) 98 - 107 mmol/L 105  104  107    CO2 22 - 29 mmol/L 25  22  22    Urea Nitrogen 8.0 - 23.0 mg/dL 28.7  21.0  31.8    Creatinine 0.67 - 1.17 mg/dL 1.28  1.14  1.18    Glucose 70 - 99 mg/dL 101  214  115    Calcium 8.8 - 10.2 mg/dL 9.8  9.3  9.1    Magnesium 1.7 - 2.3 mg/dL 1.5  1.5  1.7          Latest Ref Rng & Units 7/21/2023     7:56 AM 7/7/2023     7:50 AM 6/30/2023     8:27 AM   Bone Health   Phosphorus 2.5 - 4.5 mg/dL 3.5  3.5  4.0    Parathyroid Hormone Intact  15 - 65 pg/mL   184          Latest Ref Rng & Units 8/15/2023     7:40 AM 7/21/2023     7:56 AM 7/7/2023     7:50 AM   Heme   WBC 4.0 - 11.0 10e3/uL 4.8  7.1  7.6    Hgb 13.3 - 17.7 g/dL 13.0  13.1  12.5    Plt 150 - 450 10e3/uL 218  214  197          Latest Ref Rng & Units 6/30/2023     8:27 AM 6/14/2023    11:38 AM 6/7/2023     8:23 AM   Liver   AP 40 - 129 U/L 138  133  140    TBili <=1.2 mg/dL 0.4  0.6  0.5    Bilirubin Direct 0.00 - 0.30 mg/dL <0.20      ALT 0 - 70 U/L 43  26  33    AST 0 - 45 U/L 31  26  22    Tot Protein 6.4 - 8.3 g/dL 6.8  6.9  6.5    Albumin 3.5 - 5.2 g/dL 4.2  4.1  3.8          Latest Ref Rng & Units 6/20/2023     7:52 AM 8/11/2008     9:27 AM 3/3/2008    10:41 AM   Pancreas   A1C <5.7 % 8.9  14.2  12.7          Latest Ref Rng & Units 9/9/2021    12:28 PM 8/12/2011     9:49 AM   Iron studies   Iron 35 - 180 ug/dL 37  55    Iron Saturation Index 15 - 46 % 16  21    Ferritin 26 - 388 ng/mL 364  117          Latest Ref Rng & Units 6/20/2023     7:52 AM 8/12/2011     9:49 AM   [facility] Txp Virology   EBV CAPSID ANTIBODY IGG No detectable antibody. Positive     Hep B Surf   327.0        Recent Labs   Lab Test 06/20/23  0752 06/30/23  0827 07/07/23  0750 07/21/23  0756   DOSTAC 6/19/2023 6/29/2023  --  7/20/2023   TACROL 6.4  6.5 9.0 8.9 8.9     Recent Labs   Lab Test 06/20/23  0752   DOSMPA 6/19/2023   7:00 PM   MPACID 0.92*   MPAG 32.9         Again, thank you for allowing me to participate in the care of your patient.        Sincerely,        Zaid Deng MD

## 2023-08-16 LAB — BKV DNA # SPEC NAA+PROBE: NOT DETECTED COPIES/ML

## 2023-08-31 ENCOUNTER — TRANSCRIBE ORDERS (OUTPATIENT)
Dept: OTHER | Age: 73
End: 2023-08-31

## 2023-08-31 DIAGNOSIS — M54.2 NECK PAIN: Primary | ICD-10-CM

## 2023-09-22 ENCOUNTER — LAB (OUTPATIENT)
Dept: LAB | Facility: CLINIC | Age: 73
End: 2023-09-22
Payer: COMMERCIAL

## 2023-09-22 DIAGNOSIS — Z48.298 AFTERCARE FOLLOWING ORGAN TRANSPLANT: ICD-10-CM

## 2023-09-22 DIAGNOSIS — Z94.0 KIDNEY REPLACED BY TRANSPLANT: ICD-10-CM

## 2023-09-22 DIAGNOSIS — Z79.899 ENCOUNTER FOR LONG-TERM CURRENT USE OF MEDICATION: ICD-10-CM

## 2023-09-22 DIAGNOSIS — Z20.828 CONTACT WITH AND (SUSPECTED) EXPOSURE TO OTHER VIRAL COMMUNICABLE DISEASES: ICD-10-CM

## 2023-09-22 LAB
ANION GAP SERPL CALCULATED.3IONS-SCNC: 10 MMOL/L (ref 7–15)
BUN SERPL-MCNC: 23.8 MG/DL (ref 8–23)
CALCIUM SERPL-MCNC: 9.3 MG/DL (ref 8.8–10.2)
CHLORIDE SERPL-SCNC: 103 MMOL/L (ref 98–107)
CREAT SERPL-MCNC: 1.36 MG/DL (ref 0.67–1.17)
DEPRECATED HCO3 PLAS-SCNC: 25 MMOL/L (ref 22–29)
EGFRCR SERPLBLD CKD-EPI 2021: 55 ML/MIN/1.73M2
ERYTHROCYTE [DISTWIDTH] IN BLOOD BY AUTOMATED COUNT: 12.9 % (ref 10–15)
GLUCOSE SERPL-MCNC: 201 MG/DL (ref 70–99)
HCT VFR BLD AUTO: 40.2 % (ref 40–53)
HGB BLD-MCNC: 13.3 G/DL (ref 13.3–17.7)
MAGNESIUM SERPL-MCNC: 1.5 MG/DL (ref 1.7–2.3)
MCH RBC QN AUTO: 29.2 PG (ref 26.5–33)
MCHC RBC AUTO-ENTMCNC: 33.1 G/DL (ref 31.5–36.5)
MCV RBC AUTO: 88 FL (ref 78–100)
PHOSPHATE SERPL-MCNC: 3.9 MG/DL (ref 2.5–4.5)
PLATELET # BLD AUTO: 210 10E3/UL (ref 150–450)
POTASSIUM SERPL-SCNC: 4.3 MMOL/L (ref 3.4–5.3)
RBC # BLD AUTO: 4.55 10E6/UL (ref 4.4–5.9)
SODIUM SERPL-SCNC: 138 MMOL/L (ref 136–145)
TACROLIMUS BLD-MCNC: 10 UG/L (ref 5–15)
TME LAST DOSE: NORMAL H
TME LAST DOSE: NORMAL H
WBC # BLD AUTO: 7.2 10E3/UL (ref 4–11)

## 2023-09-22 PROCEDURE — 85027 COMPLETE CBC AUTOMATED: CPT | Performed by: PATHOLOGY

## 2023-09-22 PROCEDURE — 84100 ASSAY OF PHOSPHORUS: CPT | Performed by: PATHOLOGY

## 2023-09-22 PROCEDURE — 80048 BASIC METABOLIC PNL TOTAL CA: CPT | Performed by: PATHOLOGY

## 2023-09-22 PROCEDURE — 87799 DETECT AGENT NOS DNA QUANT: CPT | Performed by: INTERNAL MEDICINE

## 2023-09-22 PROCEDURE — 83735 ASSAY OF MAGNESIUM: CPT | Performed by: PATHOLOGY

## 2023-09-22 PROCEDURE — 99000 SPECIMEN HANDLING OFFICE-LAB: CPT | Performed by: PATHOLOGY

## 2023-09-22 PROCEDURE — 80197 ASSAY OF TACROLIMUS: CPT | Performed by: INTERNAL MEDICINE

## 2023-09-22 PROCEDURE — 36415 COLL VENOUS BLD VENIPUNCTURE: CPT | Performed by: PATHOLOGY

## 2023-09-25 DIAGNOSIS — Z48.298 AFTERCARE FOLLOWING ORGAN TRANSPLANT: Primary | ICD-10-CM

## 2023-09-25 LAB — BKV DNA # SPEC NAA+PROBE: NOT DETECTED COPIES/ML

## 2023-09-25 NOTE — TELEPHONE ENCOUNTER
ISSUE:   Tacrolimus IR level 10 on 9/22, goal 6-8, dose 3 mg BID.    PLAN:   Please call patient and confirm this was an accurate 12-hour trough. Verify Tacrolimus IR dose 3 mg BID. Confirm no new medications or illness. Confirm no missed doses. If accurate trough and accurate dose, decrease Tacrolimus IR dose to 2 mg BID and repeat labs in 1 weeks    Lizett Downing RN, BSN  Post-Kidney/Pancreas Transplant Coordinator   [phone]

## 2023-09-26 ENCOUNTER — OFFICE VISIT (OUTPATIENT)
Dept: PHARMACY | Facility: CLINIC | Age: 73
End: 2023-09-26
Attending: INTERNAL MEDICINE
Payer: COMMERCIAL

## 2023-09-26 DIAGNOSIS — E78.5 DYSLIPIDEMIA: ICD-10-CM

## 2023-09-26 DIAGNOSIS — Z94.0 KIDNEY TRANSPLANT RECIPIENT: Primary | ICD-10-CM

## 2023-09-26 DIAGNOSIS — E11.69 TYPE 2 DIABETES MELLITUS WITH OTHER SPECIFIED COMPLICATION, WITH LONG-TERM CURRENT USE OF INSULIN (H): ICD-10-CM

## 2023-09-26 DIAGNOSIS — Z79.4 TYPE 2 DIABETES MELLITUS WITH OTHER SPECIFIED COMPLICATION, WITH LONG-TERM CURRENT USE OF INSULIN (H): ICD-10-CM

## 2023-09-26 DIAGNOSIS — I15.1 HTN, KIDNEY TRANSPLANT RELATED: ICD-10-CM

## 2023-09-26 DIAGNOSIS — Z78.9 TAKES DIETARY SUPPLEMENTS: ICD-10-CM

## 2023-09-26 DIAGNOSIS — Z94.0 HTN, KIDNEY TRANSPLANT RELATED: ICD-10-CM

## 2023-09-26 PROCEDURE — 99606 MTMS BY PHARM EST 15 MIN: CPT | Performed by: PHARMACIST

## 2023-09-26 PROCEDURE — 99607 MTMS BY PHARM ADDL 15 MIN: CPT | Performed by: PHARMACIST

## 2023-09-26 RX ORDER — METOPROLOL SUCCINATE 50 MG/1
50 TABLET, EXTENDED RELEASE ORAL DAILY
COMMUNITY

## 2023-09-26 RX ORDER — MAGNESIUM OXIDE 400 MG/1
400 TABLET ORAL DAILY
Qty: 90 TABLET | Refills: 3 | Status: SHIPPED | OUTPATIENT
Start: 2023-09-26

## 2023-09-26 NOTE — PROGRESS NOTES
Medication Therapy Management (MTM) Encounter    ASSESSMENT:                            Medication Adherence/Access: No issues identified    Kidney Transplant:    Patient has low magnesium levels, will start a supplement per patient request. Discussed vaccines due now that he is 6 months post txp.    Diabetes   Stable. A1c <8%, per patient report, BG at goal premeal of .     Hypertension   BP near goal <130/80. Recently started Metoprolol at [facility] clinic, no changes today.     Hyperlipidemia   Stable.     PLAN:                            Recommend Shingrix vaccine (2 doses 8 weeks apart)  2. Start Magnesium 400mg (1 tablet) once daily at lunchtime.   3. COVID/ Flu shot.     Follow-up: 12/14 at 1 PM    SUBJECTIVE/OBJECTIVE:                          Jass Fierro is a 72 year old male called for a follow-up visit from 7/6. Patient was accompanied by wife.  (ID# unknown) was used during today's visit.       Reason for visit: 8 months post transplant.  Wondering if can  Allergies/ADRs: Reviewed in chart  Past Medical History: Reviewed in chart  Tobacco: He reports that he has never smoked. He does not have any smokeless tobacco history on file.  Alcohol: not currently using    Medication Adherence/Access: Per patient, misses medication 0 times per week. Emir sets up his medications.      Kidney Transplant:  Current immunosuppressants include Tacrolimus 3mg twice daily and Myfortic 540mg twice daily, Prednisone 5mg daily.  Pt reports Tremors only in right hand.   Transplant date: 2/1/23 in [place].  PJP prophylaxis: Bactrim S S daily  Vascular prophylaxis; Aspirin 81mg daily. Denies gastrointestinal bleed Sx.   Tx Coordinator: Jaydon Bowens MD: Tera Christie, Using Med Card: Yes  Immunizations: annual flu shot 2022; Pneumovax 23:  2022; Prevnar 13: 2018; TDaP:  2018; Shingrix: unknown, HBV: immunity in 2011, COVID: Moderna x1  Estimated Creatinine Clearance: 51.3 mL/min (A) (based on SCr of 1.36  mg/dL (H)).    Diabetes   Lantus 30 unit at bedtime  Novolog 14-15 units before meals  BG this morning 82.   Fastin-110  Lunch: 120-140  Dinner: <140  Low BGs: Last night his BG was 62. Feels shaky, blinking in his eyes. This happens about once a month.     Lab Results   Component Value Date    A1C 7.8 (H) 08/15/2023     Hypertension   Metoprolol ER 50mg daily.  This was started by [facility] clinic.   Amlodipine 10mg at bedtime.  BPs at home 130-140/70-80  Reports some swelling in ankles, but minor   BP Readings from Last 3 Encounters:   08/15/23 132/76   23 (!) 149/90   23 137/71     Pulse Readings from Last 3 Encounters:   08/15/23 89   23 94   23 100     Hyperlipidemia   Atorvastatin 40mg daily.  No unexplained myalgias.     Recent Labs   Lab Test 08/15/23  0740 21  1228   CHOL 112 100   HDL 66 27*   LDL 34 38   TRIG 61 176*     Today's Vitals: There were no vitals taken for this visit.  ----------------    I spent 30 minutes with this patient today. All changes were made via collaborative practice agreement with Dr. Tera Christie. A copy of the visit note was provided to the patient's provider(s).    A summary of these recommendations was given to the patient.    Agapito Ramírez, PharmD  [facility] Pharmacist    Phone: [phone]      Medication Therapy Recommendations  Kidney transplant recipient    Rationale: Preventive therapy - Needs additional medication therapy - Indication   Recommendation: Order Vaccine - Shingrix 50 MCG/0.5ML Susr   Status: Patient Agreed - Adherence/Education         Takes dietary supplements    Rationale: Untreated condition - Needs additional medication therapy - Indication   Recommendation: Start Medication - MAGnesium-Oxide 400 (240 Mg) MG Tabs   Status: Accepted per CPA

## 2023-09-26 NOTE — PATIENT INSTRUCTIONS
"Recommendations from today's MTM visit:                                                       Recommend Shingrix vaccine (2 doses 8 weeks apart)    Flu shot when available, and COVID booster when available    Start Magnesium 400mg (1 tablet) once daily at lunchtime.    Follow-up: 12/14 at 1 PM    It was great speaking with you today.  I value your experience and would be very thankful for your time in providing feedback in our clinic survey. In the next few days, you may receive an email or text message from [employer] with a link to a survey related to your  clinical pharmacist.\"     To schedule another MTM appointment, please call the clinic directly or you may call the MTM scheduling line at [phone] or toll-free at [phone].     My Clinical Pharmacist's contact information:                                                      Please feel free to contact me with any questions or concerns you have.      Agapito Ramírez, PharmD  MTM Pharmacist    Phone: [phone]     "

## 2023-09-27 NOTE — TELEPHONE ENCOUNTER
Left message with [facility]  to return call to txp office. Sent [employer] message regarding:  Tacrolimus IR level 10 on 9/22, goal 6-8, dose 3 mg BID.     PLAN:   Please call patient and confirm this was an accurate 12-hour trough. Verify Tacrolimus IR dose 3 mg BID. Confirm no new medications or illness. Confirm no missed doses. If accurate trough and accurate dose, decrease Tacrolimus IR dose to 2 mg BID and repeat labs in 1 weeks

## 2023-09-29 RX ORDER — TACROLIMUS 1 MG/1
2 CAPSULE ORAL 2 TIMES DAILY
Qty: 360 CAPSULE | Refills: 3 | Status: SHIPPED | OUTPATIENT
Start: 2023-09-29 | End: 2023-12-15

## 2023-09-29 NOTE — TELEPHONE ENCOUNTER
Patient confirms this was an accurate 12-hour trough. Verified Tacrolimus IR dose 3 mg BID. Confirmed no new medications or illness. Confirmed no missed doses. Patient confirms decrease Tacrolimus IR dose to 2 mg BID and repeat labs in 1 weeks   lateral

## 2023-10-02 ENCOUNTER — THERAPY VISIT (OUTPATIENT)
Dept: PHYSICAL THERAPY | Facility: CLINIC | Age: 73
End: 2023-10-02
Attending: INTERNAL MEDICINE
Payer: COMMERCIAL

## 2023-10-02 DIAGNOSIS — M54.2 NECK PAIN: Primary | ICD-10-CM

## 2023-10-02 PROCEDURE — 97112 NEUROMUSCULAR REEDUCATION: CPT | Mod: GP

## 2023-10-02 PROCEDURE — 97161 PT EVAL LOW COMPLEX 20 MIN: CPT | Mod: GP

## 2023-10-02 NOTE — PROGRESS NOTES
"PHYSICAL THERAPY EVALUATION  Type of Visit: Evaluation    See electronic medical record for Abuse and Falls Screening details.    Subjective       Presenting condition or subjective complaint:    Pt is reporting neck, shoulder, and upper back pain (R>L). This began 15 years ago but started getting worse recently. Pain is worst when lying down or when trying to lift things. He uses FWW in the community but SBQC at home.    Date of onset: 09/02/23  1 month  Relevant medical history:     Dates & types of surgery:   Kidney transplant 8 months ago    Prior diagnostic imaging/testing results:       Prior therapy history for the same diagnosis, illness or injury:     \"A long time ago\"    Prior Level of Function  Transfers: Independent  Ambulation: Independent, Assistive equipment  ADL: Independent, Assistive equipment  IADL:  Modified independent    Living Environment  Social support:   wife  Type of home:   apartment  Stairs to enter the home:       Elevator  Ramp:     Stairs inside the home:       No  Help at home:   Lives alone, wife lives locally and helps  Equipment owned:   SBQC, FWW that he uses if he needs to walk a little bit longer (he can walk 10 min)    Employment:     retired  Hobbies/Interests:   watches movies and news, walking    Patient goals for therapy:   improve pain in upper back, neck, lower back    Pain assessment: 7/10 is typical, sometimes he has no pain     Objective   Cognitive Status Examination  Orientation: Oriented to person, place and time   Level of Consciousness: Alert  Follows Commands and Answers Questions: 100% of the time  Personal Safety and Judgement: Intact  Memory: Intact    OBSERVATION: Pt is pleasant male presenting to PT with wife, ambulates with SBQC on left.  INTEGUMENTARY: Intact  POSTURE: Standing Posture: Rounded shoulders, Forward head, Thoracic kyphosis increased  Sitting Posture: Rounded shoulders, Forward head, Thoracic kyphosis increased  PALPATION: Tenderness to UT " and cervical musculature  RANGE OF MOTION: UE ROM WFL  (Degrees) Left AROM Right AROM    Cervical Flexion 75%    Cervical Extension 50%    Cervical Side bend 50% 50%    Cervical Rotation 50% 50%    Pain: stretch  End Feel:  tissue stretch  STRENGTH: B shoulder and elbow strength 4+/5,  strength 5/5    BED MOBILITY: WFL    TRANSFERS: WFL    WHEELCHAIR MOBILITY: NA    GAIT:   Level of [place]: Independent  Assistive Device(s): Cane (quad)  Gait Deviations: Base of support increased  Stride length decreased  Brianne decreased  Gait Distance: 150 ft  Stairs: NT    SENSATION: UE Sensation WNL    MUSCLE TONE: Hypertonic Upper traps, LS, scalenes    Assessment & Plan   CLINICAL IMPRESSIONS  Medical Diagnosis: Neck pain (M54.2)    Treatment Diagnosis: Neck pain with impaired mobility   Impression/Assessment: Patient is a 72 year old male with neck and shoulder pain complaints.  The following significant findings have been identified: Pain, Decreased ROM/flexibility, Decreased strength, Impaired muscle performance, and Impaired posture. These impairments interfere with their ability to perform self care tasks, recreational activities, household chores, and community mobility as compared to previous level of function.     Clinical Decision Making (Complexity):  Clinical Presentation: Stable/Uncomplicated  Clinical Presentation Rationale: based on medical and personal factors listed in PT evaluation  Clinical Decision Making (Complexity): Low complexity    PLAN OF CARE  Treatment Interventions:  Interventions: Manual Therapy, Neuromuscular Re-education, Therapeutic Activity, Therapeutic Exercise, Self-Care/Home Management    Long Term Goals     PT Goal 1  Goal Identifier: Walking  Goal Description: Pt will be able to walk for at least 30 minutes before break to demonstrate improved activity tolerance.  Goal Progress: 10/2: pt can walk for 10 min  Target Date: 12/30/23  PT Goal 2  Goal Identifier: Pain  Goal  Description: Pt will report neck/shoulder pain at no worse than 2/10 to demonstrate improved QOL.  Goal Progress: 10/2: 8/10 at worst  Target Date: 12/30/23      Frequency of Treatment: 2x/month  Duration of Treatment: 90 days    Recommended Referrals to Other Professionals:     Education Assessment:   Learner/Method: Patient;Family;Demonstration;Pictures/Video    Risks and benefits of evaluation/treatment have been explained.   Patient/Family/caregiver agrees with Plan of Care.     Evaluation Time:     PT Eval, Low Complexity Minutes (02288): 20     Signing Clinician: BETITO Ash [facility]                                                                                   OUTPATIENT PHYSICAL THERAPY      PLAN OF TREATMENT FOR OUTPATIENT REHABILITATION   Patient's Last Name, First Name, [email or web address]  Jass Fierro YOB: 1950   Provider's Name   [facility]   Medical Record No.  [id number]     Onset Date: 09/02/23  Start of Care Date: 10/02/23     Medical Diagnosis:  Neck pain (M54.2)      PT Treatment Diagnosis:  Neck pain with impaired mobility Plan of Treatment  Frequency/Duration: 2x/month/ 90 days    Certification date from 10/02/23 to 12/30/23         See note for plan of treatment details and functional goals     Kevyn Macias, PT                         I CERTIFY THE NEED FOR THESE SERVICES FURNISHED UNDER        THIS PLAN OF TREATMENT AND WHILE UNDER MY CARE .             Physician Signature               Date    X_____________________________________________________                    Referring Provider:  Micheal Zimmer      Initial Assessment  See Epic Evaluation- Start of Care Date: 10/02/23

## 2023-10-25 ENCOUNTER — THERAPY VISIT (OUTPATIENT)
Dept: PHYSICAL THERAPY | Facility: CLINIC | Age: 73
End: 2023-10-25
Payer: COMMERCIAL

## 2023-10-25 DIAGNOSIS — M54.2 NECK PAIN: Primary | ICD-10-CM

## 2023-10-25 PROCEDURE — 97112 NEUROMUSCULAR REEDUCATION: CPT | Mod: GP

## 2023-10-25 PROCEDURE — 97110 THERAPEUTIC EXERCISES: CPT | Mod: GP

## 2023-11-01 ENCOUNTER — TRANSCRIBE ORDERS (OUTPATIENT)
Dept: BEHAVIORAL HEALTH | Facility: CLINIC | Age: 73
End: 2023-11-01
Payer: MEDICAID

## 2023-11-09 ENCOUNTER — OFFICE VISIT (OUTPATIENT)
Dept: TRANSPLANT | Facility: CLINIC | Age: 73
End: 2023-11-09
Attending: INTERNAL MEDICINE
Payer: COMMERCIAL

## 2023-11-09 ENCOUNTER — LAB (OUTPATIENT)
Dept: LAB | Facility: CLINIC | Age: 73
End: 2023-11-09
Attending: INTERNAL MEDICINE
Payer: COMMERCIAL

## 2023-11-09 VITALS
SYSTOLIC BLOOD PRESSURE: 135 MMHG | DIASTOLIC BLOOD PRESSURE: 69 MMHG | HEIGHT: 67 IN | BODY MASS INDEX: 34.39 KG/M2 | OXYGEN SATURATION: 99 % | WEIGHT: 219.1 LBS | HEART RATE: 66 BPM | RESPIRATION RATE: 18 BRPM | TEMPERATURE: 98 F

## 2023-11-09 DIAGNOSIS — Z94.0 KIDNEY REPLACED BY TRANSPLANT: ICD-10-CM

## 2023-11-09 DIAGNOSIS — Z29.89 NEED FOR PNEUMOCYSTIS PROPHYLAXIS: ICD-10-CM

## 2023-11-09 DIAGNOSIS — N17.9 AKI (ACUTE KIDNEY INJURY) (H): ICD-10-CM

## 2023-11-09 DIAGNOSIS — Z23 NEED FOR INFLUENZA VACCINATION: ICD-10-CM

## 2023-11-09 DIAGNOSIS — Z23 NEED FOR COVID-19 VACCINE: ICD-10-CM

## 2023-11-09 DIAGNOSIS — Z79.899 ENCOUNTER FOR LONG-TERM CURRENT USE OF MEDICATION: ICD-10-CM

## 2023-11-09 DIAGNOSIS — Z20.828 CONTACT WITH AND (SUSPECTED) EXPOSURE TO OTHER VIRAL COMMUNICABLE DISEASES: ICD-10-CM

## 2023-11-09 DIAGNOSIS — I15.1 HTN, KIDNEY TRANSPLANT RELATED: ICD-10-CM

## 2023-11-09 DIAGNOSIS — Z48.298 AFTERCARE FOLLOWING ORGAN TRANSPLANT: ICD-10-CM

## 2023-11-09 DIAGNOSIS — N25.81 SECONDARY HYPERPARATHYROIDISM OF RENAL ORIGIN (H): ICD-10-CM

## 2023-11-09 DIAGNOSIS — Z94.0 HTN, KIDNEY TRANSPLANT RELATED: ICD-10-CM

## 2023-11-09 DIAGNOSIS — Z94.0 KIDNEY REPLACED BY TRANSPLANT: Primary | ICD-10-CM

## 2023-11-09 DIAGNOSIS — E11.21 TYPE 2 DIABETES MELLITUS WITH DIABETIC NEPHROPATHY, UNSPECIFIED WHETHER LONG TERM INSULIN USE (H): ICD-10-CM

## 2023-11-09 DIAGNOSIS — D84.9 IMMUNOSUPPRESSION (H): ICD-10-CM

## 2023-11-09 LAB
ALBUMIN MFR UR ELPH: 9.2 MG/DL
ALBUMIN UR-MCNC: NEGATIVE MG/DL
ANION GAP SERPL CALCULATED.3IONS-SCNC: 10 MMOL/L (ref 7–15)
APPEARANCE UR: CLEAR
BILIRUB UR QL STRIP: NEGATIVE
BUN SERPL-MCNC: 24.5 MG/DL (ref 8–23)
CALCIUM SERPL-MCNC: 9.1 MG/DL (ref 8.8–10.2)
CHLORIDE SERPL-SCNC: 103 MMOL/L (ref 98–107)
COLOR UR AUTO: ABNORMAL
CREAT SERPL-MCNC: 1.4 MG/DL (ref 0.67–1.17)
CREAT UR-MCNC: 96.4 MG/DL
DEPRECATED HCO3 PLAS-SCNC: 23 MMOL/L (ref 22–29)
EGFRCR SERPLBLD CKD-EPI 2021: 53 ML/MIN/1.73M2
ERYTHROCYTE [DISTWIDTH] IN BLOOD BY AUTOMATED COUNT: 13.2 % (ref 10–15)
GLUCOSE SERPL-MCNC: 226 MG/DL (ref 70–99)
GLUCOSE UR STRIP-MCNC: NEGATIVE MG/DL
HCT VFR BLD AUTO: 42.1 % (ref 40–53)
HGB BLD-MCNC: 14 G/DL (ref 13.3–17.7)
HGB UR QL STRIP: NEGATIVE
KETONES UR STRIP-MCNC: NEGATIVE MG/DL
LEUKOCYTE ESTERASE UR QL STRIP: NEGATIVE
MCH RBC QN AUTO: 28.7 PG (ref 26.5–33)
MCHC RBC AUTO-ENTMCNC: 33.3 G/DL (ref 31.5–36.5)
MCV RBC AUTO: 86 FL (ref 78–100)
MUCOUS THREADS #/AREA URNS LPF: PRESENT /LPF
NITRATE UR QL: NEGATIVE
PH UR STRIP: 5.5 [PH] (ref 5–7)
PLATELET # BLD AUTO: 202 10E3/UL (ref 150–450)
POTASSIUM SERPL-SCNC: 4.6 MMOL/L (ref 3.4–5.3)
PROT/CREAT 24H UR: 0.1 MG/MG CR (ref 0–0.2)
RBC # BLD AUTO: 4.87 10E6/UL (ref 4.4–5.9)
RBC URINE: 0 /HPF
SODIUM SERPL-SCNC: 136 MMOL/L (ref 135–145)
SP GR UR STRIP: 1.01 (ref 1–1.03)
TACROLIMUS BLD-MCNC: 6.5 UG/L (ref 5–15)
TME LAST DOSE: NORMAL H
TME LAST DOSE: NORMAL H
UROBILINOGEN UR STRIP-MCNC: NORMAL MG/DL
WBC # BLD AUTO: 6.8 10E3/UL (ref 4–11)
WBC URINE: <1 /HPF

## 2023-11-09 PROCEDURE — 84156 ASSAY OF PROTEIN URINE: CPT | Performed by: PATHOLOGY

## 2023-11-09 PROCEDURE — 250N000011 HC RX IP 250 OP 636: Performed by: INTERNAL MEDICINE

## 2023-11-09 PROCEDURE — 86833 HLA CLASS II HIGH DEFIN QUAL: CPT | Performed by: INTERNAL MEDICINE

## 2023-11-09 PROCEDURE — 80048 BASIC METABOLIC PNL TOTAL CA: CPT | Performed by: PATHOLOGY

## 2023-11-09 PROCEDURE — 86832 HLA CLASS I HIGH DEFIN QUAL: CPT | Performed by: INTERNAL MEDICINE

## 2023-11-09 PROCEDURE — 90662 IIV NO PRSV INCREASED AG IM: CPT | Performed by: INTERNAL MEDICINE

## 2023-11-09 PROCEDURE — 87799 DETECT AGENT NOS DNA QUANT: CPT | Performed by: INTERNAL MEDICINE

## 2023-11-09 PROCEDURE — G0008 ADMIN INFLUENZA VIRUS VAC: HCPCS | Performed by: INTERNAL MEDICINE

## 2023-11-09 PROCEDURE — 99000 SPECIMEN HANDLING OFFICE-LAB: CPT | Performed by: PATHOLOGY

## 2023-11-09 PROCEDURE — 81001 URINALYSIS AUTO W/SCOPE: CPT | Performed by: PATHOLOGY

## 2023-11-09 PROCEDURE — 99214 OFFICE O/P EST MOD 30 MIN: CPT | Performed by: INTERNAL MEDICINE

## 2023-11-09 PROCEDURE — 91320 SARSCV2 VAC 30MCG TRS-SUC IM: CPT | Performed by: INTERNAL MEDICINE

## 2023-11-09 PROCEDURE — 90480 ADMN SARSCOV2 VAC 1/ONLY CMP: CPT | Performed by: INTERNAL MEDICINE

## 2023-11-09 PROCEDURE — 80197 ASSAY OF TACROLIMUS: CPT | Performed by: INTERNAL MEDICINE

## 2023-11-09 PROCEDURE — 36415 COLL VENOUS BLD VENIPUNCTURE: CPT | Performed by: PATHOLOGY

## 2023-11-09 PROCEDURE — G0463 HOSPITAL OUTPT CLINIC VISIT: HCPCS | Mod: 25 | Performed by: INTERNAL MEDICINE

## 2023-11-09 PROCEDURE — 85027 COMPLETE CBC AUTOMATED: CPT | Performed by: PATHOLOGY

## 2023-11-09 RX ORDER — TORSEMIDE 10 MG/1
15 TABLET ORAL DAILY
COMMUNITY
Start: 2023-10-26 | End: 2024-02-01

## 2023-11-09 RX ADMIN — COVID-19 VACCINE, MRNA 30 MCG: 0.05 INJECTION, SUSPENSION INTRAMUSCULAR at 10:02

## 2023-11-09 RX ADMIN — INFLUENZA A VIRUS A/VICTORIA/4897/2022 IVR-238 (H1N1) ANTIGEN (FORMALDEHYDE INACTIVATED), INFLUENZA A VIRUS A/DARWIN/9/2021 SAN-010 (H3N2) ANTIGEN (FORMALDEHYDE INACTIVATED), INFLUENZA B VIRUS B/PHUKET/3073/2013 ANTIGEN (FORMALDEHYDE INACTIVATED), AND INFLUENZA B VIRUS B/MICHIGAN/01/2021 ANTIGEN (FORMALDEHYDE INACTIVATED) 0.7 ML: 60; 60; 60; 60 INJECTION, SUSPENSION INTRAMUSCULAR at 10:02

## 2023-11-09 ASSESSMENT — PAIN SCALES - GENERAL: PAINLEVEL: NO PAIN (0)

## 2023-11-09 NOTE — PROGRESS NOTES
TRANSPLANT NEPHROLOGY CONSULT NOTE      Assessment & Plan   # LDKT: uptrend Cr to 1.4, reduce torsemide to 15 mg every other day, repeat labs next week, f/up Fk trough today -pending   - Baseline Creatinine  ~ 1.1-1.2   - Proteinuria: Normal (<0.2 grams)   - Date DSA Last Checked: Not Known         - BK Viremia: No   - Kidney Tx Biopsy: No    # Immunosuppression: Tacrolimus (goal trough 6-8). /540 prednisone 5   - Continue with intensive monitoring of immunosuppression for efficacy and toxicity.  - Changes: fk pending    # Infection Prophylaxis:   - PJP: Sulfa/TMP (Bactrim),CD4 count>200, can stop bactrim    # hx of Latent Tb:   - on IZN, per his son who assists in med setup, he was prescribed 90 d and has 1 more month left, unclear if he completed 9 month Tx   - referred to ID but no appointment scheduled, will confirm with his coordinator    # Hypertension:acceptable control Goal BP: < 130/80   - Changes: Not at this time     # Diabetes: improving control, HbA1C:7.8%   - Management as per Endocrinology and primary care.    # Anemia in Chronic Renal Disease: Hgb: Stable      CAROLYN: No   - Iron studies: Not checked recently    # Mineral Bone Disorder:   -secondary hyperparathyroidism, vit D deficiency-16: vit D3 2000 international unit(s) qd    # Skin Cancer Risk:    - Discussed sun protection and recommend regular follow up with Dermatology.    # Medical Compliance: Yes    # Health Maintenance and Vaccination Review: Follow up with PCP    # Transplant History:  Etiology of Kidney Failure: Diabetic nephropathy  Tx: LDKT  Transplant: 2/1/2023 (Kidney)  Significant changes in immunosuppression: None  Significant transplant-related complications: None      Transplant Office Phone Number: [phone]    Assessment and plan was discussed with the patient and he voiced his understanding and agreement.    Return visit: 3 months     Zaid Deng MD      Chief Complaint   Mr. Fierro is a 72 year old here for kidney  transplant and immunosuppression management.    History of Present Illness     Feels good overall and reports no new concerns. DM control slightly improved HbA1C 7.8% on most recent check: fasting ~80s-220-250 post prandial. Using insulin and ssi.followed by PCP. BP is fairly controlled. He denies any missed meds. Drinks about 1.5 l/day. Denies any urinary symptoms or graft pain.  Discussed bringing med list and pill bottles to every clinic visit    Current IS: FK 2/2 (goal 6-8) /540 pred 5  Ppx: bactrim  Vaccines: flu and covid, recommend rsv  Ca screen: review next visit    Home BP: Not checked, doesn't have a BP monitor    Review of Systems   A comprehensive review of systems was obtained and negative, except as noted in the HPI or PMH.    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 tuberculosis    Anal fissure    Essential hypertension    Proteinuria    TYPE 2 DIABETES, HBA1C GOAL < 7%    CARDIOVASCULAR SCREENING; LDL GOAL LESS THAN 100    Non-proliferative diabetic retinopathy, both eyes (H)    Pseudophakia, right eye    Cataract, left       Past Medical History    Past Medical History:   Diagnosis Date    Anal fissure     Kidney problem     causes leg swelling, underfunctioning    Non-proliferative diabetic retinopathy, both eyes (H) 9/16/2015    Nonsenile cataract     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     finished 9 months INH 7/03-4/04    Type II or unspecified type diabetes mellitus without mention of complication, not stated as uncontrolled     Unspecified essential hypertension        Past Surgical History   Past Surgical History:   Procedure Laterality Date    CATARACT IOL, RT/LT Right 2012    doctor and clinic unknown to pt    PHACOEMULSIFICATION WITH STANDARD INTRAOCULAR LENS IMPLANT Left 3/1/2019    Procedure: Left Eye Cataract Removal with Intraocular Lens Implant;  Surgeon: Kendal Vazquez MD;   Location:  OR    [facility] NONSPECIFIC PROCEDURE  03/2002    Fistulotomy + partial lat. internal sphincterotomy       Family History   Family History   Problem Relation Age of Onset    Family History Negative Other     Diabetes Other     Hypertension Other     Diabetes Mother     Diabetes Father     Hypertension Father     Hypertension Brother     Cancer No family hx of     Glaucoma No family hx of         Social History   Social History     Tobacco Use    Smoking status: Never   Substance Use Topics    Alcohol use: No    Drug use: No       Allergies   Allergies   Allergen Reactions    Seasonal Allergies Itching       Medications   Current Outpatient Medications   Medication Sig    acetaminophen (ACETAMINOPHEN 8 HOUR) 650 MG CR tablet Take 650 mg by mouth every 8 hours as needed for mild pain or fever    amLODIPine (NORVASC) 10 MG tablet Take 1 tablet by mouth daily    ASPIRIN 81 MG OR TABS ONE DAILY    atorvastatin (LIPITOR) 40 MG tablet Take 1 tablet by mouth daily    blood glucose monitoring (NO BRAND SPECIFIED) meter device kit Use to test blood sugar 3 times daily or as directed.    Calcium Carb-Cholecalciferol (CALCIUM 500 + D) 500-3.125 MG-MCG TABS Take 1 tablet by mouth daily    insulin aspart (NOVOLOG FLEXPEN) 100 UNIT/ML pen Inject 14 Units Subcutaneous 3 times daily (with meals)    insulin glargine (LANTUS PEN) 100 UNIT/ML pen Inject 22 Units Subcutaneous At Bedtime    magnesium oxide (MAG-OX) 400 MG tablet Take 1 tablet (400 mg) by mouth daily Take at 12 PM    metoprolol succinate ER (TOPROL XL) 50 MG 24 hr tablet Take 50 mg by mouth daily    mycophenolic acid (GENERIC EQUIVALENT) 180 MG EC tablet Take 1 tablet (180 mg) by mouth 2 times daily Total dose = 540 mg twice per day    mycophenolic acid (GENERIC EQUIVALENT) 360 MG EC tablet Take 1 tablet (360 mg) by mouth 2 times daily Total dose = 540 mg twice per day    predniSONE (DELTASONE) 5 MG tablet Take 1 tablet (5 mg) by mouth daily     "sulfamethoxazole-trimethoprim (BACTRIM) 400-80 MG tablet Take 1 tablet by mouth daily    tacrolimus (GENERIC EQUIVALENT) 1 MG capsule Take 2 capsules (2 mg) by mouth 2 times daily    tamsulosin (FLOMAX) 0.4 MG capsule Take 0.4 mg by mouth daily    torsemide (DEMADEX) 10 MG tablet Take 15 mg by mouth daily     No current facility-administered medications for this visit.     There are no discontinued medications.    Physical Exam   Vital Signs: /69 (BP Location: Left arm, Patient Position: Sitting, Cuff Size: Adult Large)   Pulse 66   Temp 98  F (36.7  C) (Oral)   Resp 18   Ht 1.71 m (5' 7.33\")   Wt 99.4 kg (219 lb 1.6 oz)   SpO2 99%   BMI 33.98 kg/m      GENERAL APPEARANCE: alert and no distress  HENT: mouth without ulcers or lesions  LYMPHATICS: no cervical or supraclavicular nodes  RESP: lungs clear to auscultation - no rales, rhonchi or wheezes  CV: regular rhythm, normal rate, no rub, no murmur  EDEMA: no LE edema bilaterally  ABDOMEN: soft, nondistended, nontender, bowel sounds normal  MS: extremities normal - no gross deformities noted, no evidence of inflammation in joints, no muscle tenderness  SKIN: no rash  Access: RUE AVF +thrill/bruit    Data         Latest Ref Rng & Units 11/9/2023     8:28 AM 9/22/2023     8:31 AM 8/15/2023     7:40 AM   Renal   Sodium 135 - 145 mmol/L 136  138  135    K 3.4 - 5.3 mmol/L 4.6  4.3  4.3    Cl 98 - 107 mmol/L 103  103  103    Cl (external) 98 - 107 mmol/L 103  103  103    CO2 22 - 29 mmol/L 23  25  24    Urea Nitrogen 8.0 - 23.0 mg/dL 24.5  23.8  23.7    Creatinine 0.67 - 1.17 mg/dL 1.40  1.36  1.24    Glucose 70 - 99 mg/dL 226  201  204    Calcium 8.8 - 10.2 mg/dL 9.1  9.3  9.3    Magnesium 1.7 - 2.3 mg/dL  1.5  1.5          Latest Ref Rng & Units 9/22/2023     8:31 AM 8/15/2023     7:40 AM 7/21/2023     7:56 AM   Bone Health   Phosphorus 2.5 - 4.5 mg/dL 3.9  3.5  3.5          Latest Ref Rng & Units 11/9/2023     8:28 AM 9/22/2023     8:31 AM 8/15/2023     " 7:40 AM   Heme   WBC 4.0 - 11.0 10e3/uL 6.8  7.2  4.8    Hgb 13.3 - 17.7 g/dL 14.0  13.3  13.0    Plt 150 - 450 10e3/uL 202  210  218          Latest Ref Rng & Units 8/15/2023     7:40 AM 6/30/2023     8:27 AM 6/14/2023    11:38 AM   Liver   AP 40 - 129 U/L 128  138  133    TBili <=1.2 mg/dL 0.6  0.4  0.6    Bilirubin Direct 0.00 - 0.30 mg/dL 0.20  <0.20     ALT 0 - 70 U/L 42  43  26    AST 0 - 45 U/L 31  31  26    Tot Protein 6.4 - 8.3 g/dL 6.6  6.8  6.9    Albumin 3.5 - 5.2 g/dL 4.1  4.2  4.1          Latest Ref Rng & Units 8/15/2023     7:40 AM 6/20/2023     7:52 AM 8/11/2008     9:27 AM   Pancreas   A1C <5.7 % 7.8  8.9  14.2          Latest Ref Rng & Units 9/9/2021    12:28 PM 8/12/2011     9:49 AM   Iron studies   Iron 35 - 180 ug/dL 37  55    Iron Saturation Index 15 - 46 % 16  21    Ferritin 26 - 388 ng/mL 364  117          Latest Ref Rng & Units 6/20/2023     7:52 AM 8/12/2011     9:49 AM   [facility] Txp Virology   EBV CAPSID ANTIBODY IGG No detectable antibody. Positive     Hep B Surf   327.0        Recent Labs   Lab Test 07/21/23  0756 08/15/23  0740 09/22/23  0831   DOSTAC 7/20/2023 8/14/2023 9/21/2023   TACROL 8.9 8.6 10.0     Recent Labs   Lab Test 06/20/23  0752   DOSMPA 6/19/2023   7:00 PM   MPACID 0.92*   MPAG 32.9

## 2023-11-09 NOTE — PATIENT INSTRUCTIONS
Drink about 2 liters of water    Monitor sugars closely and use sliding scale insulin, follow up with primary regarding diabetes control    Reduce torsemide to 1 tab every Mon Wed Fri, monitor weight and blood pressure daily (skip tomorrow, Sat, Sun)      Transplant Patient Information  Your Post Transplant Coordinator is: Stacie Sanchez  You and your care team can contact your transplant coordinator Monday - Friday, 8am - 5pm at [phone] (Option 2 to reach the coordinator or Option 4 to schedule an appointment).  You can also reach your care team online via AnyLeaf.  After hours for urgent matters, please call [facility] at [phone].

## 2023-11-09 NOTE — LETTER
11/9/2023         RE: Jass Fierro  [address]  [place] [address]        Dear Colleague,    Thank you for referring your patient, Jass Fierro, to the [facility]. Please see a copy of my visit note below.    TRANSPLANT NEPHROLOGY CONSULT NOTE      Assessment & Plan  # LDKT: uptrend Cr to 1.4, reduce torsemide to 15 mg every other day, repeat labs next week, f/up Fk trough today -pending   - Baseline Creatinine  ~ 1.1-1.2   - Proteinuria: Normal (<0.2 grams)   - Date DSA Last Checked: Not Known         - BK Viremia: No   - Kidney Tx Biopsy: No    # Immunosuppression: Tacrolimus (goal trough 6-8). /540 prednisone 5   - Continue with intensive monitoring of immunosuppression for efficacy and toxicity.  - Changes: fk pending    # Infection Prophylaxis:   - PJP: Sulfa/TMP (Bactrim),CD4 count>200, can stop bactrim    # hx of Latent Tb:   - on IZN, per his son who assists in med setup, he was prescribed 90 d and has 1 more month left, unclear if he completed 9 month Tx   - referred to ID but no appointment scheduled, will confirm with his coordinator    # Hypertension:acceptable control Goal BP: < 130/80   - Changes: Not at this time     # Diabetes: improving control, HbA1C:7.8%   - Management as per Endocrinology and primary care.    # Anemia in Chronic Renal Disease: Hgb: Stable      CAROLYN: No   - Iron studies: Not checked recently    # Mineral Bone Disorder:   -secondary hyperparathyroidism, vit D deficiency-16: vit D3 2000 international unit(s) qd    # Skin Cancer Risk:    - Discussed sun protection and recommend regular follow up with Dermatology.    # Medical Compliance: Yes    # Health Maintenance and Vaccination Review: Follow up with PCP    # Transplant History:  Etiology of Kidney Failure: Diabetic nephropathy  Tx: LDKT  Transplant: 2/1/2023 (Kidney)  Significant changes in immunosuppression: None  Significant transplant-related complications:  None      Transplant Office Phone Number: [phone]    Assessment and plan was discussed with the patient and he voiced his understanding and agreement.    Return visit: 3 months     Zaid Deng MD      Chief Complaint  Mr. Fierro is a 72 year old here for kidney transplant and immunosuppression management.    History of Present Illness    Feels good overall and reports no new concerns. DM control slightly improved HbA1C 7.8% on most recent check: fasting ~80s-220-250 post prandial. Using insulin and ssi.followed by PCP. BP is fairly controlled. He denies any missed meds. Drinks about 1.5 l/day. Denies any urinary symptoms or graft pain.  Discussed bringing med list and pill bottles to every clinic visit    Current IS: FK 2/2 (goal 6-8) /540 pred 5  Ppx: bactrim  Vaccines: flu and covid, recommend rsv  Ca screen: review next visit    Home BP: Not checked, doesn't have a BP monitor    Review of Systems  A comprehensive review of systems was obtained and negative, except as noted in the HPI or PMH.    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 tuberculosis    Anal fissure    Essential hypertension    Proteinuria    TYPE 2 DIABETES, HBA1C GOAL < 7%    CARDIOVASCULAR SCREENING; LDL GOAL LESS THAN 100    Non-proliferative diabetic retinopathy, both eyes (H)    Pseudophakia, right eye    Cataract, left       Past Medical History   Past Medical History:   Diagnosis Date    Anal fissure     Kidney problem     causes leg swelling, underfunctioning    Non-proliferative diabetic retinopathy, both eyes (H) 9/16/2015    Nonsenile cataract     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     finished 9 months INH 7/03-4/04    Type II or unspecified type diabetes mellitus without mention of complication, not stated as uncontrolled     Unspecified essential hypertension        Past Surgical History  Past Surgical History:    Procedure Laterality Date    CATARACT IOL, RT/LT Right 2012    doctor and clinic unknown to pt    PHACOEMULSIFICATION WITH STANDARD INTRAOCULAR LENS IMPLANT Left 3/1/2019    Procedure: Left Eye Cataract Removal with Intraocular Lens Implant;  Surgeon: Kendal Vazquez MD;  Location:  OR    [facility] NONSPECIFIC PROCEDURE  03/2002    Fistulotomy + partial lat. internal sphincterotomy       Family History  Family History   Problem Relation Age of Onset    Family History Negative Other     Diabetes Other     Hypertension Other     Diabetes Mother     Diabetes Father     Hypertension Father     Hypertension Brother     Cancer No family hx of     Glaucoma No family hx of         Social History  Social History     Tobacco Use    Smoking status: Never   Substance Use Topics    Alcohol use: No    Drug use: No       Allergies  Allergies   Allergen Reactions    Seasonal Allergies Itching       Medications  Current Outpatient Medications   Medication Sig    acetaminophen (ACETAMINOPHEN 8 HOUR) 650 MG CR tablet Take 650 mg by mouth every 8 hours as needed for mild pain or fever    amLODIPine (NORVASC) 10 MG tablet Take 1 tablet by mouth daily    ASPIRIN 81 MG OR TABS ONE DAILY    atorvastatin (LIPITOR) 40 MG tablet Take 1 tablet by mouth daily    blood glucose monitoring (NO BRAND SPECIFIED) meter device kit Use to test blood sugar 3 times daily or as directed.    Calcium Carb-Cholecalciferol (CALCIUM 500 + D) 500-3.125 MG-MCG TABS Take 1 tablet by mouth daily    insulin aspart (NOVOLOG FLEXPEN) 100 UNIT/ML pen Inject 14 Units Subcutaneous 3 times daily (with meals)    insulin glargine (LANTUS PEN) 100 UNIT/ML pen Inject 22 Units Subcutaneous At Bedtime    magnesium oxide (MAG-OX) 400 MG tablet Take 1 tablet (400 mg) by mouth daily Take at 12 PM    metoprolol succinate ER (TOPROL XL) 50 MG 24 hr tablet Take 50 mg by mouth daily    mycophenolic acid (GENERIC EQUIVALENT) 180 MG EC tablet Take 1 tablet (180 mg) by mouth 2 times  "daily Total dose = 540 mg twice per day    mycophenolic acid (GENERIC EQUIVALENT) 360 MG EC tablet Take 1 tablet (360 mg) by mouth 2 times daily Total dose = 540 mg twice per day    predniSONE (DELTASONE) 5 MG tablet Take 1 tablet (5 mg) by mouth daily    sulfamethoxazole-trimethoprim (BACTRIM) 400-80 MG tablet Take 1 tablet by mouth daily    tacrolimus (GENERIC EQUIVALENT) 1 MG capsule Take 2 capsules (2 mg) by mouth 2 times daily    tamsulosin (FLOMAX) 0.4 MG capsule Take 0.4 mg by mouth daily    torsemide (DEMADEX) 10 MG tablet Take 15 mg by mouth daily     No current facility-administered medications for this visit.     There are no discontinued medications.    Physical Exam  Vital Signs: /69 (BP Location: Left arm, Patient Position: Sitting, Cuff Size: Adult Large)   Pulse 66   Temp 98  F (36.7  C) (Oral)   Resp 18   Ht 1.71 m (5' 7.33\")   Wt 99.4 kg (219 lb 1.6 oz)   SpO2 99%   BMI 33.98 kg/m      GENERAL APPEARANCE: alert and no distress  HENT: mouth without ulcers or lesions  LYMPHATICS: no cervical or supraclavicular nodes  RESP: lungs clear to auscultation - no rales, rhonchi or wheezes  CV: regular rhythm, normal rate, no rub, no murmur  EDEMA: no LE edema bilaterally  ABDOMEN: soft, nondistended, nontender, bowel sounds normal  MS: extremities normal - no gross deformities noted, no evidence of inflammation in joints, no muscle tenderness  SKIN: no rash  Access: RUE AVF +thrill/bruit    Data        Latest Ref Rng & Units 11/9/2023     8:28 AM 9/22/2023     8:31 AM 8/15/2023     7:40 AM   Renal   Sodium 135 - 145 mmol/L 136  138  135    K 3.4 - 5.3 mmol/L 4.6  4.3  4.3    Cl 98 - 107 mmol/L 103  103  103    Cl (external) 98 - 107 mmol/L 103  103  103    CO2 22 - 29 mmol/L 23  25  24    Urea Nitrogen 8.0 - 23.0 mg/dL 24.5  23.8  23.7    Creatinine 0.67 - 1.17 mg/dL 1.40  1.36  1.24    Glucose 70 - 99 mg/dL 226  201  204    Calcium 8.8 - 10.2 mg/dL 9.1  9.3  9.3    Magnesium 1.7 - 2.3 mg/dL  " 1.5  1.5          Latest Ref Rng & Units 9/22/2023     8:31 AM 8/15/2023     7:40 AM 7/21/2023     7:56 AM   Bone Health   Phosphorus 2.5 - 4.5 mg/dL 3.9  3.5  3.5          Latest Ref Rng & Units 11/9/2023     8:28 AM 9/22/2023     8:31 AM 8/15/2023     7:40 AM   Heme   WBC 4.0 - 11.0 10e3/uL 6.8  7.2  4.8    Hgb 13.3 - 17.7 g/dL 14.0  13.3  13.0    Plt 150 - 450 10e3/uL 202  210  218          Latest Ref Rng & Units 8/15/2023     7:40 AM 6/30/2023     8:27 AM 6/14/2023    11:38 AM   Liver   AP 40 - 129 U/L 128  138  133    TBili <=1.2 mg/dL 0.6  0.4  0.6    Bilirubin Direct 0.00 - 0.30 mg/dL 0.20  <0.20     ALT 0 - 70 U/L 42  43  26    AST 0 - 45 U/L 31  31  26    Tot Protein 6.4 - 8.3 g/dL 6.6  6.8  6.9    Albumin 3.5 - 5.2 g/dL 4.1  4.2  4.1          Latest Ref Rng & Units 8/15/2023     7:40 AM 6/20/2023     7:52 AM 8/11/2008     9:27 AM   Pancreas   A1C <5.7 % 7.8  8.9  14.2          Latest Ref Rng & Units 9/9/2021    12:28 PM 8/12/2011     9:49 AM   Iron studies   Iron 35 - 180 ug/dL 37  55    Iron Saturation Index 15 - 46 % 16  21    Ferritin 26 - 388 ng/mL 364  117          Latest Ref Rng & Units 6/20/2023     7:52 AM 8/12/2011     9:49 AM   [facility] Txp Virology   EBV CAPSID ANTIBODY IGG No detectable antibody. Positive     Hep B Surf   327.0        Recent Labs   Lab Test 07/21/23  0756 08/15/23  0740 09/22/23  0831   DOSTAC 7/20/2023 8/14/2023 9/21/2023   TACROL 8.9 8.6 10.0     Recent Labs   Lab Test 06/20/23  0752   DOSMPA 6/19/2023   7:00 PM   MPACID 0.92*   MPAG 32.9     Duplicate      Again, thank you for allowing me to participate in the care of your patient.        Sincerely,        Zaid Deng MD

## 2023-11-10 ENCOUNTER — TELEPHONE (OUTPATIENT)
Dept: TRANSPLANT | Facility: CLINIC | Age: 73
End: 2023-11-10

## 2023-11-10 DIAGNOSIS — Z22.7 LATENT TUBERCULOSIS: Primary | ICD-10-CM

## 2023-11-10 LAB — BKV DNA # SPEC NAA+PROBE: NOT DETECTED COPIES/ML

## 2023-11-10 NOTE — TELEPHONE ENCOUNTER
----- Message from Zaid Christie MD sent at 11/9/2023 10:29 AM CST -----  Is his ID referral still active to discuss latent Tb treatment completion?  Reduced his torsemide dose as well and will see if next labs Cr improves

## 2023-11-16 ENCOUNTER — LAB (OUTPATIENT)
Dept: LAB | Facility: CLINIC | Age: 73
End: 2023-11-16
Payer: COMMERCIAL

## 2023-11-16 DIAGNOSIS — Z94.0 KIDNEY REPLACED BY TRANSPLANT: ICD-10-CM

## 2023-11-16 DIAGNOSIS — Z48.298 AFTERCARE FOLLOWING ORGAN TRANSPLANT: ICD-10-CM

## 2023-11-16 DIAGNOSIS — Z79.899 ENCOUNTER FOR LONG-TERM CURRENT USE OF MEDICATION: ICD-10-CM

## 2023-11-16 DIAGNOSIS — Z20.828 CONTACT WITH AND (SUSPECTED) EXPOSURE TO OTHER VIRAL COMMUNICABLE DISEASES: ICD-10-CM

## 2023-11-16 LAB
ALBUMIN SERPL BCG-MCNC: 4.2 G/DL (ref 3.5–5.2)
ALP SERPL-CCNC: 97 U/L (ref 40–150)
ALT SERPL W P-5'-P-CCNC: 33 U/L (ref 0–70)
ANION GAP SERPL CALCULATED.3IONS-SCNC: 10 MMOL/L (ref 7–15)
AST SERPL W P-5'-P-CCNC: 29 U/L (ref 0–45)
BILIRUB DIRECT SERPL-MCNC: <0.2 MG/DL (ref 0–0.3)
BILIRUB SERPL-MCNC: 0.4 MG/DL
BUN SERPL-MCNC: 21 MG/DL (ref 8–23)
CALCIUM SERPL-MCNC: 9.4 MG/DL (ref 8.8–10.2)
CHLORIDE SERPL-SCNC: 103 MMOL/L (ref 98–107)
CREAT SERPL-MCNC: 1.23 MG/DL (ref 0.67–1.17)
DEPRECATED HCO3 PLAS-SCNC: 24 MMOL/L (ref 22–29)
EGFRCR SERPLBLD CKD-EPI 2021: 62 ML/MIN/1.73M2
ERYTHROCYTE [DISTWIDTH] IN BLOOD BY AUTOMATED COUNT: 13.2 % (ref 10–15)
GLUCOSE SERPL-MCNC: 162 MG/DL (ref 70–99)
HCT VFR BLD AUTO: 42 % (ref 40–53)
HGB BLD-MCNC: 13.9 G/DL (ref 13.3–17.7)
MCH RBC QN AUTO: 28.7 PG (ref 26.5–33)
MCHC RBC AUTO-ENTMCNC: 33.1 G/DL (ref 31.5–36.5)
MCV RBC AUTO: 87 FL (ref 78–100)
PLATELET # BLD AUTO: 196 10E3/UL (ref 150–450)
POTASSIUM SERPL-SCNC: 4.5 MMOL/L (ref 3.4–5.3)
PROT SERPL-MCNC: 6.9 G/DL (ref 6.4–8.3)
RBC # BLD AUTO: 4.84 10E6/UL (ref 4.4–5.9)
SODIUM SERPL-SCNC: 137 MMOL/L (ref 135–145)
TACROLIMUS BLD-MCNC: 9.3 UG/L (ref 5–15)
TME LAST DOSE: NORMAL H
TME LAST DOSE: NORMAL H
WBC # BLD AUTO: 9.2 10E3/UL (ref 4–11)

## 2023-11-16 PROCEDURE — 85027 COMPLETE CBC AUTOMATED: CPT | Performed by: PATHOLOGY

## 2023-11-16 PROCEDURE — 99000 SPECIMEN HANDLING OFFICE-LAB: CPT | Performed by: PATHOLOGY

## 2023-11-16 PROCEDURE — 36415 COLL VENOUS BLD VENIPUNCTURE: CPT | Performed by: PATHOLOGY

## 2023-11-16 PROCEDURE — 80197 ASSAY OF TACROLIMUS: CPT | Performed by: INTERNAL MEDICINE

## 2023-11-16 PROCEDURE — 82248 BILIRUBIN DIRECT: CPT | Performed by: PATHOLOGY

## 2023-11-16 PROCEDURE — 80053 COMPREHEN METABOLIC PANEL: CPT | Performed by: PATHOLOGY

## 2023-11-16 PROCEDURE — 87799 DETECT AGENT NOS DNA QUANT: CPT | Performed by: INTERNAL MEDICINE

## 2023-11-17 LAB — BKV DNA # SPEC NAA+PROBE: NOT DETECTED COPIES/ML

## 2023-11-19 ENCOUNTER — HEALTH MAINTENANCE LETTER (OUTPATIENT)
Age: 73
End: 2023-11-19

## 2023-11-20 ENCOUNTER — THERAPY VISIT (OUTPATIENT)
Dept: PHYSICAL THERAPY | Facility: CLINIC | Age: 73
End: 2023-11-20
Payer: COMMERCIAL

## 2023-11-20 DIAGNOSIS — M54.2 NECK PAIN: Primary | ICD-10-CM

## 2023-11-20 LAB
SA 1 CELL: NORMAL
SA 1 TEST METHOD: NORMAL
SA 2 CELL: NORMAL
SA 2 TEST METHOD: NORMAL
SA1 HI RISK ABY: NORMAL
SA1 MOD RISK ABY: NORMAL
SA2 HI RISK ABY: NORMAL
SA2 MOD RISK ABY: NORMAL
UNACCEPTABLE ANTIGENS: NORMAL
UNOS CPRA: 4
ZZZSA 1  COMMENTS: NORMAL
ZZZSA 2 COMMENTS: NORMAL

## 2023-11-20 PROCEDURE — 97112 NEUROMUSCULAR REEDUCATION: CPT | Mod: GP

## 2023-11-20 PROCEDURE — 97110 THERAPEUTIC EXERCISES: CPT | Mod: GP

## 2023-12-05 ENCOUNTER — TRANSFERRED RECORDS (OUTPATIENT)
Dept: HEALTH INFORMATION MANAGEMENT | Facility: CLINIC | Age: 73
End: 2023-12-05
Payer: MEDICAID

## 2023-12-14 ENCOUNTER — OFFICE VISIT (OUTPATIENT)
Dept: PHARMACY | Facility: CLINIC | Age: 73
End: 2023-12-14
Payer: COMMERCIAL

## 2023-12-14 ENCOUNTER — OFFICE VISIT (OUTPATIENT)
Dept: TRANSPLANT | Facility: CLINIC | Age: 73
End: 2023-12-14
Attending: INTERNAL MEDICINE
Payer: COMMERCIAL

## 2023-12-14 VITALS — WEIGHT: 219.7 LBS | DIASTOLIC BLOOD PRESSURE: 83 MMHG | BODY MASS INDEX: 34.07 KG/M2 | SYSTOLIC BLOOD PRESSURE: 148 MMHG

## 2023-12-14 DIAGNOSIS — Z94.0 HTN, KIDNEY TRANSPLANT RELATED: ICD-10-CM

## 2023-12-14 DIAGNOSIS — E78.5 DYSLIPIDEMIA: ICD-10-CM

## 2023-12-14 DIAGNOSIS — D84.9 IMMUNOSUPPRESSION (H): ICD-10-CM

## 2023-12-14 DIAGNOSIS — I15.1 HTN, KIDNEY TRANSPLANT RELATED: ICD-10-CM

## 2023-12-14 DIAGNOSIS — Z48.298 AFTERCARE FOLLOWING ORGAN TRANSPLANT: ICD-10-CM

## 2023-12-14 DIAGNOSIS — E11.69 TYPE 2 DIABETES MELLITUS WITH OTHER SPECIFIED COMPLICATION, WITH LONG-TERM CURRENT USE OF INSULIN (H): ICD-10-CM

## 2023-12-14 DIAGNOSIS — Z29.89 NEED FOR PNEUMOCYSTIS PROPHYLAXIS: ICD-10-CM

## 2023-12-14 DIAGNOSIS — N25.81 SECONDARY HYPERPARATHYROIDISM OF RENAL ORIGIN (H): ICD-10-CM

## 2023-12-14 DIAGNOSIS — Z79.4 TYPE 2 DIABETES MELLITUS WITH OTHER SPECIFIED COMPLICATION, WITH LONG-TERM CURRENT USE OF INSULIN (H): ICD-10-CM

## 2023-12-14 DIAGNOSIS — Z78.9 TAKES DIETARY SUPPLEMENTS: ICD-10-CM

## 2023-12-14 DIAGNOSIS — Z94.0 KIDNEY TRANSPLANT RECIPIENT: Primary | ICD-10-CM

## 2023-12-14 DIAGNOSIS — Z94.0 KIDNEY REPLACED BY TRANSPLANT: Primary | ICD-10-CM

## 2023-12-14 PROCEDURE — 99607 MTMS BY PHARM ADDL 15 MIN: CPT | Performed by: PHARMACIST

## 2023-12-14 PROCEDURE — G0463 HOSPITAL OUTPT CLINIC VISIT: HCPCS | Performed by: INTERNAL MEDICINE

## 2023-12-14 PROCEDURE — 99214 OFFICE O/P EST MOD 30 MIN: CPT | Performed by: INTERNAL MEDICINE

## 2023-12-14 PROCEDURE — 99606 MTMS BY PHARM EST 15 MIN: CPT | Performed by: PHARMACIST

## 2023-12-14 NOTE — PROGRESS NOTES
Medication Therapy Management (MTM) Encounter    ASSESSMENT:                            Medication Adherence/Access: No issues identified    Kidney Transplant:    Discussed long term vaccines due today.     Supplements:   Patient instructed to refill magnesium and start taking again.     Diabetes   BG well controlled per patient report.     Hypertension   Patient seeing Nephrologist today. No changes by MTM.     Hyperlipidemia   Stable.     PLAN:                            Refill Magnesium if you have not been taking these.   Recommend Shingrix (2 doses 8 weeks apart) and RSV vaccine. Get these from PCP.     Follow-up: as needed    SUBJECTIVE/OBJECTIVE:                          Jass Fierro is a 72 year old male coming in for a follow-up visit from 9/26. Patient was accompanied by wife.  (ID# unknown) was used during today's visit.     Reason for visit: > 6 months post transplant.     Allergies/ADRs: Reviewed in chart  Past Medical History: Reviewed in chart  Tobacco: He reports that he has never smoked. He does not have any smokeless tobacco history on file.  Alcohol: not currently using    Medication Adherence/Access: Patient uses pill box(es) and has assistance with medication administration: family member, Emir.    Kidney Transplant:  Current immunosuppressants include   Tacrolimus 3mg twice daily   Myfortic 540mg twice daily,   Prednisone 5mg daily.    Pt reports Tremors only in right hand.   Transplant date: 2/1/23 in [place].  PJP prophylaxis: Bactrim S S daily  Vascular prophylaxis; Aspirin 81mg daily. Denies gastrointestinal bleed Sx.   Tx Coordinator: Jaydon Bowens MD: Tera Christie, Using Med Card: Yes  Immunizations: annual flu shot 2023; Pneumovax 23:  2022; Prevnar 13: 2018; TDaP:  2018; Shingrix: unknown, HBV: immunity in 2011, COVID: Moderna x1, 23-24.   Estimated Creatinine Clearance: 59.5 mL/min (A) (based on SCr of 1.23 mg/dL (H)).    Supplements:   Calcium.D daily  Has not been  rocioign Magnesium.   Lab Results   Component Value Date    MAG 1.5 (L) 2023     Diabetes Lantus 30 unit at bedtime  Novolog 14-15 units before meals  BG this morning   Fastin-110  Lunch: 150  Dinner: 150  Low BGs: 3-4x a month.    Lab Results   Component Value Date    A1C 7.8 (H) 08/15/2023     Hypertension   Metoprolol ER 50mg daily.   Amlodipine 10mg twice daily   States he has not been taking Torsemide.   BPs at home   Reports some swelling in ankles, but minor   BP Readings from Last 3 Encounters:   23 135/69   08/15/23 132/76   23 (!) 149/90     Pulse Readings from Last 3 Encounters:   23 66   08/15/23 89   23 94     Hyperlipidemia Atorvastatin 40mg daily.  No unexplained myalgias.     Recent Labs   Lab Test 08/15/23  0740 21  1228   CHOL 112 100   HDL 66 27*   LDL 34 38   TRIG 61 176*     Today's Vitals: There were no vitals taken for this visit.  ----------------    I spent 20 minutes with this patient today. All changes were made via collaborative practice agreement with Dr. Tera Christie. A copy of the visit note was provided to the patient's provider(s).    A summary of these recommendations was sent via [employer].    Agapito Ramírez, PharmD  MTM Pharmacist    Phone: [phone]      Medication Therapy Recommendations  Kidney transplant recipient    Rationale: Preventive therapy - Needs additional medication therapy - Indication   Recommendation: Order Vaccine - Shingrix 50 MCG/0.5ML Susr   Status: Accepted - no CPA Needed         Takes dietary supplements    Current Medication: magnesium oxide (MAG-OX) 400 MG tablet   Rationale: Does not understand instructions - Adherence - Adherence   Recommendation: Start Medication   Status: Accepted - no CPA Needed

## 2023-12-14 NOTE — PATIENT INSTRUCTIONS
We will send you a blood pressure monitor     Monitor home blood pressure daily     Bring all your pill boxes to next clinic visit    Labs this week then monthly    Transplant Patient Information  Your Post Transplant Coordinator is: Stacie Sanchez  You and your care team can contact your transplant coordinator Monday - Friday, 8am - 5pm at [phone] (Option 2 to reach the coordinator or Option 4 to schedule an appointment).  You can also reach your care team online via C[employer]Net.  After hours for urgent matters, please call [facility] at [phone].

## 2023-12-14 NOTE — PATIENT INSTRUCTIONS
"Recommendations from today's MTM visit:                                                       Refill Magnesium if you have not been taking these.   Recommend Shingrix (2 doses 8 weeks apart) and RSV vaccine. Get these from PCP.     Follow-up: as needed    It was great speaking with you today.  I value your experience and would be very thankful for your time in providing feedback in our clinic survey. In the next few days, you may receive an email or text message from [employer] with a link to a survey related to your  clinical pharmacist.\"     To schedule another MTM appointment, please call the clinic directly or you may call the MTM scheduling line at [phone] or toll-free at [phone].     My Clinical Pharmacist's contact information:                                                      Please feel free to contact me with any questions or concerns you have.      Agapito Ramírez, PharmD  MTM Pharmacist    Phone: [phone]     "

## 2023-12-14 NOTE — LETTER
12/14/2023         RE: Jass Fierro  [address]  [facility] [address]        Dear Colleague,    Thank you for referring your patient, Jass Fierro, to the [facility]. Please see a copy of my visit note below.    TRANSPLANT NEPHROLOGY CONSULT NOTE      Assessment & Plan  # LDKT: downtrend back to baseline after reducing torsemide dose   - Baseline Creatinine  ~ 1.1-1.2   - Proteinuria: Normal (<0.2 grams)   - Date DSA Last Checked: Not Known         - BK Viremia: No   - Kidney Tx Biopsy: No    # Immunosuppression: Tacrolimus (goal trough 6-8). /540 prednisone 5   - Continue with intensive monitoring of immunosuppression for efficacy and toxicity.  - Changes: no    # Infection Prophylaxis:   - PJP: none, CD4 count>200    # hx of Latent Tb:   - completed IZN, unclear if completed 9 month Tx   - referred to ID but no appointment scheduled, will confirm with coordinator    # Hypertension: elevated in clinic, improved on repeat down to 140s/90s,  Goal BP: < 130/80   - Changes:  not at this time      - diuretic: torsemide 10g MWF    # Diabetes: improving control, HbA1C:7.8%   - Management as per Endocrinology and primary care.    # Anemia in Chronic Renal Disease: Hgb: Stable      CAROLYN: No   - Iron studies: Not checked recently    # Mineral Bone Disorder:   -secondary hyperparathyroidism, vit D deficiency: vit D3 2000 international unit(s) qd    # Skin Cancer Risk:    - Discussed sun protection and recommend regular follow up with Dermatology.    # Medical Compliance: Yes    # Health Maintenance and Vaccination Review: Follow up with PCP    # Transplant History:  Etiology of Kidney Failure: Diabetic nephropathy  Tx: LDKT  Transplant: 2/1/2023 (Kidney)  Significant changes in immunosuppression: None  Significant transplant-related complications: None      Transplant Office Phone Number: [phone]    Assessment and plan was discussed with the patient and  he voiced his understanding and agreement.    Return visit: 3 months     Zaid Deng MD      Chief Complaint  Mr. Fierro is a 72 year old here for kidney transplant and immunosuppression management.    History of Present Illness    Feels good overall and reports no new concerns. Energy level is good. Denies any fevers, chills, weight loss, night sweats. No nausea, vomiting, abdominal pain, diarrhea. No chest pain, sob, leg swelling. No recent illness or hospitalization. He didn't bring all his pill boxes today with him and he was reminded to bring all meds to next clinic visit.  He reports chronic nocturia 3-4x/night.no dysuria. He didn't take his am meds and clinic BP was elevated on initial check improved on repeat down to 140s.    Current IS: FK 2/2 (goal 6-8) /540 pred 5  Ppx: bactrim  Vaccines: flu and covid, recommend rsv  Ca screen: review next visit    Home BP: doesn't have a monitor, ordered one today    Review of Systems  A comprehensive review of systems was obtained and negative, except as noted in the HPI or PMH.    Problem List  Patient Active Problem List   Diagnosis     Diabetes mellitus, type 2 (H)     Nonspecific reaction to tuberculin skin test without active tuberculosis     Anal fissure     Essential hypertension     Proteinuria     TYPE 2 DIABETES, HBA1C GOAL < 7%     CARDIOVASCULAR SCREENING; LDL GOAL LESS THAN 100     Non-proliferative diabetic retinopathy, both eyes (H)     Pseudophakia, right eye     Cataract, left       Past Medical History   Past Medical History:   Diagnosis Date     Anal fissure      Kidney problem     causes leg swelling, underfunctioning     Non-proliferative diabetic retinopathy, both eyes (H) 9/16/2015     Nonsenile cataract      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     finished 9 months INH 7/03-4/04     Type II or unspecified type diabetes mellitus without mention of complication, not stated as uncontrolled      Unspecified essential hypertension        Past  Surgical History  Past Surgical History:   Procedure Laterality Date     CATARACT IOL, RT/LT Right 2012    doctor and clinic unknown to pt     PHACOEMULSIFICATION WITH STANDARD INTRAOCULAR LENS IMPLANT Left 3/1/2019    Procedure: Left Eye Cataract Removal with Intraocular Lens Implant;  Surgeon: Kendal Vazquez MD;  Location:  OR     [facility] NONSPECIFIC PROCEDURE  03/2002    Fistulotomy + partial lat. internal sphincterotomy       Family History  Family History   Problem Relation Age of Onset     Family History Negative Other      Diabetes Other      Hypertension Other      Diabetes Mother      Diabetes Father      Hypertension Father      Hypertension Brother      Cancer No family hx of      Glaucoma No family hx of         Social History  Social History     Tobacco Use     Smoking status: Never   Substance Use Topics     Alcohol use: No     Drug use: No       Allergies  Allergies   Allergen Reactions     Seasonal Allergies Itching       Medications  Current Outpatient Medications   Medication Sig     acetaminophen (ACETAMINOPHEN 8 HOUR) 650 MG CR tablet Take 650 mg by mouth every 8 hours as needed for mild pain or fever     amLODIPine (NORVASC) 10 MG tablet Take 1 tablet by mouth daily     ASPIRIN 81 MG OR TABS ONE DAILY     atorvastatin (LIPITOR) 40 MG tablet Take 1 tablet by mouth daily     blood glucose monitoring (NO BRAND SPECIFIED) meter device kit Use to test blood sugar 3 times daily or as directed.     Calcium Carb-Cholecalciferol (CALCIUM 500 + D) 500-3.125 MG-MCG TABS Take 1 tablet by mouth daily     insulin aspart (NOVOLOG FLEXPEN) 100 UNIT/ML pen Inject 13 Units Subcutaneous 3 times daily (with meals)     insulin glargine (LANTUS PEN) 100 UNIT/ML pen Inject 30 Units Subcutaneous at bedtime     magnesium oxide (MAG-OX) 400 MG tablet Take 1 tablet (400 mg) by mouth daily Take at 12 PM     metoprolol succinate ER (TOPROL XL) 50 MG 24 hr tablet Take 50 mg by mouth daily     mycophenolic acid (GENERIC  EQUIVALENT) 180 MG EC tablet Take 1 tablet (180 mg) by mouth 2 times daily Total dose = 540 mg twice per day     mycophenolic acid (GENERIC EQUIVALENT) 360 MG EC tablet Take 1 tablet (360 mg) by mouth 2 times daily Total dose = 540 mg twice per day     predniSONE (DELTASONE) 5 MG tablet Take 1 tablet (5 mg) by mouth daily     sulfamethoxazole-trimethoprim (BACTRIM) 400-80 MG tablet Take 1 tablet by mouth daily     tacrolimus (GENERIC EQUIVALENT) 1 MG capsule Take 2 capsules (2 mg) by mouth 2 times daily (Patient taking differently: Take 3 mg by mouth 2 times daily)     tamsulosin (FLOMAX) 0.4 MG capsule Take 0.4 mg by mouth daily     torsemide (DEMADEX) 10 MG tablet Take 15 mg by mouth daily     No current facility-administered medications for this visit.     There are no discontinued medications.    Physical Exam  Vital Signs: BP (!) 148/83   Wt 99.7 kg (219 lb 11.2 oz)   BMI 34.07 kg/m      GENERAL APPEARANCE: alert and no distress  HENT: mouth without ulcers or lesions  LYMPHATICS: no cervical or supraclavicular nodes  RESP: lungs clear to auscultation - no rales, rhonchi or wheezes  CV: regular rhythm, normal rate, no rub, no murmur  EDEMA: no LE edema bilaterally  ABDOMEN: soft, nondistended, nontender, bowel sounds normal  MS: extremities normal - no gross deformities noted, no evidence of inflammation in joints, no muscle tenderness  SKIN: no rash  Access: RUE AVF +thrill/bruit    Data        Latest Ref Rng & Units 11/16/2023     8:24 AM 11/9/2023     8:28 AM 9/22/2023     8:31 AM   Renal   Sodium 135 - 145 mmol/L 137  136  138    K 3.4 - 5.3 mmol/L 4.5  4.6  4.3    Cl 98 - 107 mmol/L 103  103  103    Cl (external) 98 - 107 mmol/L 103  103  103    CO2 22 - 29 mmol/L 24  23  25    Urea Nitrogen 8.0 - 23.0 mg/dL 21.0  24.5  23.8    Creatinine 0.67 - 1.17 mg/dL 1.23  1.40  1.36    Glucose 70 - 99 mg/dL 162  226  201    Calcium 8.8 - 10.2 mg/dL 9.4  9.1  9.3    Magnesium 1.7 - 2.3 mg/dL   1.5          Latest  Ref Rng & Units 9/22/2023     8:31 AM 8/15/2023     7:40 AM 7/21/2023     7:56 AM   Bone Health   Phosphorus 2.5 - 4.5 mg/dL 3.9  3.5  3.5          Latest Ref Rng & Units 11/16/2023     8:24 AM 11/9/2023     8:28 AM 9/22/2023     8:31 AM   Heme   WBC 4.0 - 11.0 10e3/uL 9.2  6.8  7.2    Hgb 13.3 - 17.7 g/dL 13.9  14.0  13.3    Plt 150 - 450 10e3/uL 196  202  210          Latest Ref Rng & Units 11/16/2023     8:24 AM 8/15/2023     7:40 AM 6/30/2023     8:27 AM   Liver   AP 40 - 150 U/L 97  128  138    TBili <=1.2 mg/dL 0.4  0.6  0.4    Bilirubin Direct 0.00 - 0.30 mg/dL <0.20  0.20  <0.20    ALT 0 - 70 U/L 33  42  43    AST 0 - 45 U/L 29  31  31    Tot Protein 6.4 - 8.3 g/dL 6.9  6.6  6.8    Albumin 3.5 - 5.2 g/dL 4.2  4.1  4.2          Latest Ref Rng & Units 8/15/2023     7:40 AM 6/20/2023     7:52 AM 8/11/2008     9:27 AM   Pancreas   A1C <5.7 % 7.8  8.9  14.2          Latest Ref Rng & Units 9/9/2021    12:28 PM 8/12/2011     9:49 AM   Iron studies   Iron 35 - 180 ug/dL 37  55    Iron Saturation Index 15 - 46 % 16  21    Ferritin 26 - 388 ng/mL 364  117          Latest Ref Rng & Units 6/20/2023     7:52 AM 8/12/2011     9:49 AM   [facility] Txp Virology   EBV CAPSID ANTIBODY IGG No detectable antibody. Positive     Hep B Surf   327.0        Recent Labs   Lab Test 09/22/23  0831 11/09/23  0828 11/16/23  0824   DOSTAC 9/21/2023 11/8/2023 11/15/2023   TACROL 10.0 6.5 9.3     Recent Labs   Lab Test 06/20/23  0752   DOSMPA 6/19/2023   7:00 PM   MPACID 0.92*   MPAG 32.9         Again, thank you for allowing me to participate in the care of your patient.        Sincerely,        Zaid Deng MD

## 2023-12-14 NOTE — NURSING NOTE
Chief Complaint   Patient presents with    RECHECK     3 month follow up     BP (!) 167/81   Wt 99.7 kg (219 lb 11.2 oz)   BMI 34.07 kg/m    Jennie Hunter on 12/14/2023 at 3:08 PM

## 2023-12-14 NOTE — PROGRESS NOTES
TRANSPLANT NEPHROLOGY CONSULT NOTE      Assessment & Plan   # LDKT: downtrend back to baseline after reducing torsemide dose   - Baseline Creatinine  ~ 1.1-1.2   - Proteinuria: Normal (<0.2 grams)   - Date DSA Last Checked: Not Known         - BK Viremia: No   - Kidney Tx Biopsy: No    # Immunosuppression: Tacrolimus (goal trough 6-8). /540 prednisone 5   - Continue with intensive monitoring of immunosuppression for efficacy and toxicity.  - Changes: no    # Infection Prophylaxis:   - PJP: none, CD4 count>200    # hx of Latent Tb:   - completed IZN, unclear if completed 9 month Tx   - referred to ID but no appointment scheduled, will confirm with coordinator    # Hypertension: elevated in clinic, improved on repeat down to 140s/90s,  Goal BP: < 130/80   - Changes:  not at this time      - diuretic: torsemide 10g MWF    # Diabetes: improving control, HbA1C:7.8%   - Management as per Endocrinology and primary care.    # Anemia in Chronic Renal Disease: Hgb: Stable      CAROLYN: No   - Iron studies: Not checked recently    # Mineral Bone Disorder:   -secondary hyperparathyroidism, vit D deficiency: vit D3 2000 international unit(s) qd    # Skin Cancer Risk:    - Discussed sun protection and recommend regular follow up with Dermatology.    # Medical Compliance: Yes    # Health Maintenance and Vaccination Review: Follow up with PCP    # Transplant History:  Etiology of Kidney Failure: Diabetic nephropathy  Tx: LDKT  Transplant: 2/1/2023 (Kidney)  Significant changes in immunosuppression: None  Significant transplant-related complications: None      Transplant Office Phone Number: [phone]    Assessment and plan was discussed with the patient and he voiced his understanding and agreement.    Return visit: 3 months     Zaid Deng MD      Chief Complaint   Mr. Fierro is a 72 year old here for kidney transplant and immunosuppression management.    History of Present Illness     Feels good overall and reports no  new concerns. Energy level is good. Denies any fevers, chills, weight loss, night sweats. No nausea, vomiting, abdominal pain, diarrhea. No chest pain, sob, leg swelling. No recent illness or hospitalization. He didn't bring all his pill boxes today with him and he was reminded to bring all meds to next clinic visit.  He reports chronic nocturia 3-4x/night.no dysuria. He didn't take his am meds and clinic BP was elevated on initial check improved on repeat down to 140s.    Current IS: FK 2/2 (goal 6-8) /540 pred 5  Ppx: bactrim  Vaccines: flu and covid, recommend rsv  Ca screen: review next visit    Home BP: doesn't have a monitor, ordered one today    Review of Systems   A comprehensive review of systems was obtained and negative, except as noted in the HPI or PMH.    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 tuberculosis    Anal fissure    Essential hypertension    Proteinuria    TYPE 2 DIABETES, HBA1C GOAL < 7%    CARDIOVASCULAR SCREENING; LDL GOAL LESS THAN 100    Non-proliferative diabetic retinopathy, both eyes (H)    Pseudophakia, right eye    Cataract, left       Past Medical History    Past Medical History:   Diagnosis Date    Anal fissure     Kidney problem     causes leg swelling, underfunctioning    Non-proliferative diabetic retinopathy, both eyes (H) 9/16/2015    Nonsenile cataract     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     finished 9 months INH 7/03-4/04    Type II or unspecified type diabetes mellitus without mention of complication, not stated as uncontrolled     Unspecified essential hypertension        Past Surgical History   Past Surgical History:   Procedure Laterality Date    CATARACT IOL, RT/LT Right 2012    doctor and clinic unknown to pt    PHACOEMULSIFICATION WITH STANDARD INTRAOCULAR LENS IMPLANT Left 3/1/2019    Procedure: Left Eye Cataract Removal with Intraocular Lens Implant;   Surgeon: Kendal Vazquez MD;  Location:  OR    [facility] NONSPECIFIC PROCEDURE  03/2002    Fistulotomy + partial lat. internal sphincterotomy       Family History   Family History   Problem Relation Age of Onset    Family History Negative Other     Diabetes Other     Hypertension Other     Diabetes Mother     Diabetes Father     Hypertension Father     Hypertension Brother     Cancer No family hx of     Glaucoma No family hx of         Social History   Social History     Tobacco Use    Smoking status: Never   Substance Use Topics    Alcohol use: No    Drug use: No       Allergies   Allergies   Allergen Reactions    Seasonal Allergies Itching       Medications   Current Outpatient Medications   Medication Sig    acetaminophen (ACETAMINOPHEN 8 HOUR) 650 MG CR tablet Take 650 mg by mouth every 8 hours as needed for mild pain or fever    amLODIPine (NORVASC) 10 MG tablet Take 1 tablet by mouth daily    ASPIRIN 81 MG OR TABS ONE DAILY    atorvastatin (LIPITOR) 40 MG tablet Take 1 tablet by mouth daily    blood glucose monitoring (NO BRAND SPECIFIED) meter device kit Use to test blood sugar 3 times daily or as directed.    Calcium Carb-Cholecalciferol (CALCIUM 500 + D) 500-3.125 MG-MCG TABS Take 1 tablet by mouth daily    insulin aspart (NOVOLOG FLEXPEN) 100 UNIT/ML pen Inject 13 Units Subcutaneous 3 times daily (with meals)    insulin glargine (LANTUS PEN) 100 UNIT/ML pen Inject 30 Units Subcutaneous at bedtime    magnesium oxide (MAG-OX) 400 MG tablet Take 1 tablet (400 mg) by mouth daily Take at 12 PM    metoprolol succinate ER (TOPROL XL) 50 MG 24 hr tablet Take 50 mg by mouth daily    mycophenolic acid (GENERIC EQUIVALENT) 180 MG EC tablet Take 1 tablet (180 mg) by mouth 2 times daily Total dose = 540 mg twice per day    mycophenolic acid (GENERIC EQUIVALENT) 360 MG EC tablet Take 1 tablet (360 mg) by mouth 2 times daily Total dose = 540 mg twice per day    predniSONE (DELTASONE) 5 MG tablet Take 1 tablet (5 mg) by  mouth daily    sulfamethoxazole-trimethoprim (BACTRIM) 400-80 MG tablet Take 1 tablet by mouth daily    tacrolimus (GENERIC EQUIVALENT) 1 MG capsule Take 2 capsules (2 mg) by mouth 2 times daily (Patient taking differently: Take 3 mg by mouth 2 times daily)    tamsulosin (FLOMAX) 0.4 MG capsule Take 0.4 mg by mouth daily    torsemide (DEMADEX) 10 MG tablet Take 15 mg by mouth daily     No current facility-administered medications for this visit.     There are no discontinued medications.    Physical Exam   Vital Signs: BP (!) 148/83   Wt 99.7 kg (219 lb 11.2 oz)   BMI 34.07 kg/m      GENERAL APPEARANCE: alert and no distress  HENT: mouth without ulcers or lesions  LYMPHATICS: no cervical or supraclavicular nodes  RESP: lungs clear to auscultation - no rales, rhonchi or wheezes  CV: regular rhythm, normal rate, no rub, no murmur  EDEMA: no LE edema bilaterally  ABDOMEN: soft, nondistended, nontender, bowel sounds normal  MS: extremities normal - no gross deformities noted, no evidence of inflammation in joints, no muscle tenderness  SKIN: no rash  Access: RUE AVF +thrill/bruit    Data         Latest Ref Rng & Units 11/16/2023     8:24 AM 11/9/2023     8:28 AM 9/22/2023     8:31 AM   Renal   Sodium 135 - 145 mmol/L 137  136  138    K 3.4 - 5.3 mmol/L 4.5  4.6  4.3    Cl 98 - 107 mmol/L 103  103  103    Cl (external) 98 - 107 mmol/L 103  103  103    CO2 22 - 29 mmol/L 24  23  25    Urea Nitrogen 8.0 - 23.0 mg/dL 21.0  24.5  23.8    Creatinine 0.67 - 1.17 mg/dL 1.23  1.40  1.36    Glucose 70 - 99 mg/dL 162  226  201    Calcium 8.8 - 10.2 mg/dL 9.4  9.1  9.3    Magnesium 1.7 - 2.3 mg/dL   1.5          Latest Ref Rng & Units 9/22/2023     8:31 AM 8/15/2023     7:40 AM 7/21/2023     7:56 AM   Bone Health   Phosphorus 2.5 - 4.5 mg/dL 3.9  3.5  3.5          Latest Ref Rng & Units 11/16/2023     8:24 AM 11/9/2023     8:28 AM 9/22/2023     8:31 AM   Heme   WBC 4.0 - 11.0 10e3/uL 9.2  6.8  7.2    Hgb 13.3 - 17.7 g/dL 13.9   14.0  13.3    Plt 150 - 450 10e3/uL 196  202  210          Latest Ref Rng & Units 11/16/2023     8:24 AM 8/15/2023     7:40 AM 6/30/2023     8:27 AM   Liver   AP 40 - 150 U/L 97  128  138    TBili <=1.2 mg/dL 0.4  0.6  0.4    Bilirubin Direct 0.00 - 0.30 mg/dL <0.20  0.20  <0.20    ALT 0 - 70 U/L 33  42  43    AST 0 - 45 U/L 29  31  31    Tot Protein 6.4 - 8.3 g/dL 6.9  6.6  6.8    Albumin 3.5 - 5.2 g/dL 4.2  4.1  4.2          Latest Ref Rng & Units 8/15/2023     7:40 AM 6/20/2023     7:52 AM 8/11/2008     9:27 AM   Pancreas   A1C <5.7 % 7.8  8.9  14.2          Latest Ref Rng & Units 9/9/2021    12:28 PM 8/12/2011     9:49 AM   Iron studies   Iron 35 - 180 ug/dL 37  55    Iron Saturation Index 15 - 46 % 16  21    Ferritin 26 - 388 ng/mL 364  117          Latest Ref Rng & Units 6/20/2023     7:52 AM 8/12/2011     9:49 AM   [facility] Txp Virology   EBV CAPSID ANTIBODY IGG No detectable antibody. Positive     Hep B Surf   327.0        Recent Labs   Lab Test 09/22/23  0831 11/09/23  0828 11/16/23  0824   DOSTAC 9/21/2023 11/8/2023 11/15/2023   TACROL 10.0 6.5 9.3     Recent Labs   Lab Test 06/20/23  0752   DOSMPA 6/19/2023   7:00 PM   MPACID 0.92*   MPAG 32.9

## 2023-12-15 ENCOUNTER — LAB (OUTPATIENT)
Dept: LAB | Facility: CLINIC | Age: 73
End: 2023-12-15
Attending: INTERNAL MEDICINE
Payer: COMMERCIAL

## 2023-12-15 DIAGNOSIS — Z48.298 AFTERCARE FOLLOWING ORGAN TRANSPLANT: ICD-10-CM

## 2023-12-15 DIAGNOSIS — Z79.899 ENCOUNTER FOR LONG-TERM CURRENT USE OF MEDICATION: ICD-10-CM

## 2023-12-15 DIAGNOSIS — Z20.828 CONTACT WITH AND (SUSPECTED) EXPOSURE TO OTHER VIRAL COMMUNICABLE DISEASES: ICD-10-CM

## 2023-12-15 DIAGNOSIS — Z94.0 KIDNEY REPLACED BY TRANSPLANT: Primary | ICD-10-CM

## 2023-12-15 DIAGNOSIS — Z98.890 OTHER SPECIFIED POSTPROCEDURAL STATES: ICD-10-CM

## 2023-12-15 DIAGNOSIS — Z94.0 KIDNEY REPLACED BY TRANSPLANT: ICD-10-CM

## 2023-12-15 LAB
ANION GAP SERPL CALCULATED.3IONS-SCNC: 9 MMOL/L (ref 7–15)
BUN SERPL-MCNC: 18.5 MG/DL (ref 8–23)
CALCIUM SERPL-MCNC: 9 MG/DL (ref 8.8–10.2)
CHLORIDE SERPL-SCNC: 102 MMOL/L (ref 98–107)
CREAT SERPL-MCNC: 1.26 MG/DL (ref 0.67–1.17)
DEPRECATED HCO3 PLAS-SCNC: 24 MMOL/L (ref 22–29)
EGFRCR SERPLBLD CKD-EPI 2021: 60 ML/MIN/1.73M2
ERYTHROCYTE [DISTWIDTH] IN BLOOD BY AUTOMATED COUNT: 13.7 % (ref 10–15)
GLUCOSE SERPL-MCNC: 192 MG/DL (ref 70–99)
HCT VFR BLD AUTO: 40 % (ref 40–53)
HGB BLD-MCNC: 13.3 G/DL (ref 13.3–17.7)
MCH RBC QN AUTO: 29.2 PG (ref 26.5–33)
MCHC RBC AUTO-ENTMCNC: 33.3 G/DL (ref 31.5–36.5)
MCV RBC AUTO: 88 FL (ref 78–100)
PLATELET # BLD AUTO: 195 10E3/UL (ref 150–450)
POTASSIUM SERPL-SCNC: 4.5 MMOL/L (ref 3.4–5.3)
RBC # BLD AUTO: 4.56 10E6/UL (ref 4.4–5.9)
SODIUM SERPL-SCNC: 135 MMOL/L (ref 135–145)
TACROLIMUS BLD-MCNC: 8.7 UG/L (ref 5–15)
TME LAST DOSE: NORMAL H
TME LAST DOSE: NORMAL H
WBC # BLD AUTO: 6.2 10E3/UL (ref 4–11)

## 2023-12-15 PROCEDURE — 80048 BASIC METABOLIC PNL TOTAL CA: CPT | Performed by: PATHOLOGY

## 2023-12-15 PROCEDURE — 87799 DETECT AGENT NOS DNA QUANT: CPT | Performed by: INTERNAL MEDICINE

## 2023-12-15 PROCEDURE — 36415 COLL VENOUS BLD VENIPUNCTURE: CPT | Performed by: PATHOLOGY

## 2023-12-15 PROCEDURE — 99000 SPECIMEN HANDLING OFFICE-LAB: CPT | Performed by: PATHOLOGY

## 2023-12-15 PROCEDURE — 85027 COMPLETE CBC AUTOMATED: CPT | Performed by: PATHOLOGY

## 2023-12-15 PROCEDURE — 80197 ASSAY OF TACROLIMUS: CPT | Performed by: INTERNAL MEDICINE

## 2023-12-15 RX ORDER — TACROLIMUS 1 MG/1
2 CAPSULE ORAL 2 TIMES DAILY
Qty: 360 CAPSULE | Refills: 3 | Status: SHIPPED | OUTPATIENT
Start: 2023-12-15 | End: 2024-02-05

## 2023-12-15 NOTE — TELEPHONE ENCOUNTER
ISSUE:   Tacrolimus IR level 8.7 on 12/15, goal 6-8, dose 3 mg BID.    PLAN:   Call Patientand confirm this was an accurate 12-hour trough.   Verify Tacrolimus IR dose 3mg BID.   Confirm no new medications or illness.   Confirm no missed doses.   If accurate trough and accurate dose, decrease Tacrolimus IR dose to 2 mg BID     Is this more than a 50% increase or decrease in current IS dose: No  If YES, justification: NA    Repeat labs in 1 month  *If > 50% change in immunosuppression dose, repeat labs in 1 week.     OUTCOME:   Spoke to Emir/patient who confirm this was an accurate 12-hour trough.   Verified Tacrolimus IR dose 3mg BID.   Confirmed no new medications or illness.   Confirmed no missed doses.   Patient confirms decrease Tacrolimus IR dose to 2 mg BID

## 2023-12-18 LAB — BKV DNA # SPEC NAA+PROBE: NOT DETECTED COPIES/ML

## 2023-12-28 DIAGNOSIS — Z48.298 AFTERCARE FOLLOWING ORGAN TRANSPLANT: ICD-10-CM

## 2023-12-28 RX ORDER — MYCOPHENOLIC ACID 180 MG/1
180 TABLET, DELAYED RELEASE ORAL 2 TIMES DAILY
Qty: 60 TABLET | Refills: 11 | Status: SHIPPED | OUTPATIENT
Start: 2023-12-28

## 2023-12-28 RX ORDER — MYCOPHENOLIC ACID 360 MG/1
360 TABLET, DELAYED RELEASE ORAL 2 TIMES DAILY
Qty: 60 TABLET | Refills: 11 | Status: SHIPPED | OUTPATIENT
Start: 2023-12-28

## 2024-01-11 ENCOUNTER — MEDICAL CORRESPONDENCE (OUTPATIENT)
Dept: HEALTH INFORMATION MANAGEMENT | Facility: CLINIC | Age: 74
End: 2024-01-11
Payer: COMMERCIAL

## 2024-01-12 ENCOUNTER — TRANSCRIBE ORDERS (OUTPATIENT)
Dept: OTHER | Age: 74
End: 2024-01-12

## 2024-01-12 DIAGNOSIS — E11.9 CONTROLLED TYPE 2 DIABETES MELLITUS WITHOUT COMPLICATION, WITH LONG-TERM CURRENT USE OF INSULIN (H): Primary | ICD-10-CM

## 2024-01-12 DIAGNOSIS — Z79.4 CONTROLLED TYPE 2 DIABETES MELLITUS WITHOUT COMPLICATION, WITH LONG-TERM CURRENT USE OF INSULIN (H): Primary | ICD-10-CM

## 2024-01-12 DIAGNOSIS — I99.9 PERIPHERAL VASCULAR COMPLICATION: Primary | ICD-10-CM

## 2024-01-18 ENCOUNTER — DOCUMENTATION ONLY (OUTPATIENT)
Dept: VASCULAR SURGERY | Facility: CLINIC | Age: 74
End: 2024-01-18
Payer: COMMERCIAL

## 2024-01-19 NOTE — PROGRESS NOTES
Action 1.18.24 sandra   Action Taken Per aditi from [facility]- Faxed request to [facility] for the CT Abd/Pelvis from 2.11.22.     Action 1.19.24 sandra   Action Taken Received 2.11.22 CT from [facility] and resolved to [facility]. Notified [facility].

## 2024-01-23 DIAGNOSIS — R09.89 OTHER SPECIFIED SYMPTOMS AND SIGNS INVOLVING THE CIRCULATORY AND RESPIRATORY SYSTEMS: ICD-10-CM

## 2024-01-23 DIAGNOSIS — I70.209 ATHEROSCLEROSIS OF NATIVE ARTERY OF EXTREMITY (H): Primary | ICD-10-CM

## 2024-01-25 ENCOUNTER — TELEPHONE (OUTPATIENT)
Dept: VASCULAR SURGERY | Facility: CLINIC | Age: 74
End: 2024-01-25
Payer: COMMERCIAL

## 2024-01-25 ENCOUNTER — APPOINTMENT (OUTPATIENT)
Dept: INTERPRETER SERVICES | Facility: CLINIC | Age: 74
End: 2024-01-25
Payer: COMMERCIAL

## 2024-01-25 NOTE — TELEPHONE ENCOUNTER
Patient Contacted    Appointment type: MATRA  Provider: COLBY  Return date: 2/13/24  Specialty phone number: [phone]  Additional appointment(s) needed: US x3  Additonal Notes: per referral. Spoke with pt via Guyanese .

## 2024-02-01 ENCOUNTER — OFFICE VISIT (OUTPATIENT)
Dept: TRANSPLANT | Facility: CLINIC | Age: 74
End: 2024-02-01
Attending: INTERNAL MEDICINE
Payer: COMMERCIAL

## 2024-02-01 ENCOUNTER — LAB (OUTPATIENT)
Dept: LAB | Facility: CLINIC | Age: 74
End: 2024-02-01
Attending: INTERNAL MEDICINE
Payer: COMMERCIAL

## 2024-02-01 VITALS
BODY MASS INDEX: 34.21 KG/M2 | HEART RATE: 65 BPM | SYSTOLIC BLOOD PRESSURE: 135 MMHG | DIASTOLIC BLOOD PRESSURE: 63 MMHG | TEMPERATURE: 98.3 F | WEIGHT: 220.6 LBS | OXYGEN SATURATION: 98 %

## 2024-02-01 DIAGNOSIS — Z79.899 ENCOUNTER FOR LONG-TERM CURRENT USE OF MEDICATION: ICD-10-CM

## 2024-02-01 DIAGNOSIS — I15.1 HTN, KIDNEY TRANSPLANT RELATED: Primary | ICD-10-CM

## 2024-02-01 DIAGNOSIS — Z98.890 OTHER SPECIFIED POSTPROCEDURAL STATES: ICD-10-CM

## 2024-02-01 DIAGNOSIS — E55.9 VITAMIN D DEFICIENCY: ICD-10-CM

## 2024-02-01 DIAGNOSIS — Z94.0 KIDNEY REPLACED BY TRANSPLANT: ICD-10-CM

## 2024-02-01 DIAGNOSIS — E83.42 HYPOMAGNESEMIA: ICD-10-CM

## 2024-02-01 DIAGNOSIS — Z29.89 NEED FOR PNEUMOCYSTIS PROPHYLAXIS: ICD-10-CM

## 2024-02-01 DIAGNOSIS — Z94.0 HTN, KIDNEY TRANSPLANT RELATED: Primary | ICD-10-CM

## 2024-02-01 DIAGNOSIS — Z48.298 AFTERCARE FOLLOWING ORGAN TRANSPLANT: ICD-10-CM

## 2024-02-01 DIAGNOSIS — N25.81 SECONDARY RENAL HYPERPARATHYROIDISM (H): ICD-10-CM

## 2024-02-01 DIAGNOSIS — Z20.828 CONTACT WITH AND (SUSPECTED) EXPOSURE TO OTHER VIRAL COMMUNICABLE DISEASES: ICD-10-CM

## 2024-02-01 DIAGNOSIS — D84.9 IMMUNOSUPPRESSED STATUS (H): ICD-10-CM

## 2024-02-01 DIAGNOSIS — N18.2 CHRONIC KIDNEY DISEASE, STAGE 2 (MILD): ICD-10-CM

## 2024-02-01 LAB
ALBUMIN SERPL BCG-MCNC: 4.2 G/DL (ref 3.5–5.2)
ALP SERPL-CCNC: 84 U/L (ref 40–150)
ALT SERPL W P-5'-P-CCNC: 39 U/L (ref 0–70)
ANION GAP SERPL CALCULATED.3IONS-SCNC: 9 MMOL/L (ref 7–15)
AST SERPL W P-5'-P-CCNC: 30 U/L (ref 0–45)
BILIRUB DIRECT SERPL-MCNC: <0.2 MG/DL (ref 0–0.3)
BILIRUB SERPL-MCNC: 0.5 MG/DL
BK VIRUS SPECIMEN TYPE: NORMAL
BKV DNA # SPEC NAA+PROBE: NOT DETECTED IU/ML
BUN SERPL-MCNC: 19.7 MG/DL (ref 8–23)
CALCIUM SERPL-MCNC: 9.3 MG/DL (ref 8.8–10.2)
CHLORIDE SERPL-SCNC: 105 MMOL/L (ref 98–107)
CHOLEST SERPL-MCNC: 113 MG/DL
CREAT SERPL-MCNC: 1.26 MG/DL (ref 0.67–1.17)
DEPRECATED HCO3 PLAS-SCNC: 25 MMOL/L (ref 22–29)
EGFRCR SERPLBLD CKD-EPI 2021: 59 ML/MIN/1.73M2
ERYTHROCYTE [DISTWIDTH] IN BLOOD BY AUTOMATED COUNT: 13.9 % (ref 10–15)
FASTING STATUS PATIENT QL REPORTED: NORMAL
GLUCOSE SERPL-MCNC: 109 MG/DL (ref 70–99)
HBA1C MFR BLD: 7.5 %
HCT VFR BLD AUTO: 39.5 % (ref 40–53)
HDLC SERPL-MCNC: 56 MG/DL
HGB BLD-MCNC: 12.8 G/DL (ref 13.3–17.7)
LDLC SERPL CALC-MCNC: 38 MG/DL
MCH RBC QN AUTO: 28.4 PG (ref 26.5–33)
MCHC RBC AUTO-ENTMCNC: 32.4 G/DL (ref 31.5–36.5)
MCV RBC AUTO: 88 FL (ref 78–100)
NONHDLC SERPL-MCNC: 57 MG/DL
PLATELET # BLD AUTO: 169 10E3/UL (ref 150–450)
POTASSIUM SERPL-SCNC: 4.3 MMOL/L (ref 3.4–5.3)
PROT SERPL-MCNC: 6.8 G/DL (ref 6.4–8.3)
PTH-INTACT SERPL-MCNC: 101 PG/ML (ref 15–65)
RBC # BLD AUTO: 4.5 10E6/UL (ref 4.4–5.9)
SODIUM SERPL-SCNC: 139 MMOL/L (ref 135–145)
TACROLIMUS BLD-MCNC: 6.8 UG/L (ref 5–15)
TME LAST DOSE: NORMAL H
TME LAST DOSE: NORMAL H
TRIGL SERPL-MCNC: 97 MG/DL
URATE SERPL-MCNC: 4.9 MG/DL (ref 3.4–7)
VIT D+METAB SERPL-MCNC: 26 NG/ML (ref 20–50)
WBC # BLD AUTO: 8.7 10E3/UL (ref 4–11)

## 2024-02-01 PROCEDURE — 80197 ASSAY OF TACROLIMUS: CPT | Performed by: INTERNAL MEDICINE

## 2024-02-01 PROCEDURE — 85027 COMPLETE CBC AUTOMATED: CPT | Performed by: PATHOLOGY

## 2024-02-01 PROCEDURE — 82306 VITAMIN D 25 HYDROXY: CPT | Performed by: INTERNAL MEDICINE

## 2024-02-01 PROCEDURE — 87799 DETECT AGENT NOS DNA QUANT: CPT | Performed by: INTERNAL MEDICINE

## 2024-02-01 PROCEDURE — 84550 ASSAY OF BLOOD/URIC ACID: CPT | Performed by: INTERNAL MEDICINE

## 2024-02-01 PROCEDURE — 83970 ASSAY OF PARATHORMONE: CPT | Performed by: INTERNAL MEDICINE

## 2024-02-01 PROCEDURE — 99000 SPECIMEN HANDLING OFFICE-LAB: CPT | Performed by: PATHOLOGY

## 2024-02-01 PROCEDURE — 36415 COLL VENOUS BLD VENIPUNCTURE: CPT | Performed by: PATHOLOGY

## 2024-02-01 PROCEDURE — 99214 OFFICE O/P EST MOD 30 MIN: CPT | Performed by: INTERNAL MEDICINE

## 2024-02-01 PROCEDURE — G2211 COMPLEX E/M VISIT ADD ON: HCPCS | Performed by: INTERNAL MEDICINE

## 2024-02-01 PROCEDURE — 80053 COMPREHEN METABOLIC PANEL: CPT | Performed by: INTERNAL MEDICINE

## 2024-02-01 PROCEDURE — G0463 HOSPITAL OUTPT CLINIC VISIT: HCPCS | Performed by: INTERNAL MEDICINE

## 2024-02-01 PROCEDURE — 83036 HEMOGLOBIN GLYCOSYLATED A1C: CPT | Performed by: INTERNAL MEDICINE

## 2024-02-01 PROCEDURE — 82248 BILIRUBIN DIRECT: CPT | Performed by: INTERNAL MEDICINE

## 2024-02-01 PROCEDURE — 80061 LIPID PANEL: CPT | Performed by: INTERNAL MEDICINE

## 2024-02-01 RX ORDER — TORSEMIDE 10 MG/1
10 TABLET ORAL DAILY
Qty: 90 TABLET | Refills: 1 | Status: SHIPPED | OUTPATIENT
Start: 2024-02-01

## 2024-02-01 ASSESSMENT — PAIN SCALES - GENERAL: PAINLEVEL: NO PAIN (0)

## 2024-02-01 NOTE — LETTER
2/1/2024         RE: Jass Fierro  [address]  [facility] [address]        Dear Colleague,    Thank you for referring your patient, Jass Fierro, to the [facility] TRANSPLANT [facility]. Please see a copy of my visit note below.    TRANSPLANT NEPHROLOGY CHRONIC POST TRANSPLANT VISIT    Assessment & Plan  # LDKT: Stable   - Baseline Creatinine:  ~ 1.1-1.3   - Proteinuria: Normal (<0.2 grams)   - Date DSA Last Checked: Not Known      Latest DSA: Transplanted at outside Tx Program (Transplanted in [facility])   - BK Viremia: No   - Kidney Tx Biopsy: No    # Immunosuppression: Tacrolimus immediate release (goal 6-8), Mycophenolic acid (dose 540 mg every 12 hours), and Prednisone (dose 5 mg daily)   - Continue with intensive monitoring of immunosuppression for efficacy and toxicity.   - On prednisone due to being transplanted at outside Transplant Program ([facility]) and came back to [place] it.   - Changes: Yes - Now that patient is one year post transplant, will target tacrolimus levels of 4-6.    # Infection Prophylaxis:   - PJP: Sulfa/TMP (Bactrim)    # Hypertension: Borderline control;  Goal BP: < 130/80   - Changes: Yes - With some edema and higher blood pressure, will restart torsemide 10 mg daily.   - If blood pressure still isn't at goal and with patient's DM, would consider starting ACEI/ARB.    # Diabetes: Borderline control (HbA1c 7-9%) Last HbA1c: 7.8%   - Management as per primary care.    # Mineral Bone Disorder:    - Secondary renal hyperparathyroidism; PTH level: Mildly elevated (151-300 pg/ml)        On treatment: None  - Vitamin D; level: Not checked recently        On supplement: Yes  - Calcium; level: Normal        On supplement: Yes    # Electrolytes:   - Potassium; level: Normal        On supplement: No  - Magnesium; level: Not checked recently        On supplement: Yes  - Bicarbonate; level: Normal        On supplement: No    # Obesity, Class I (BMI = 33.5): Stable  weight.   - Recommend weight loss for overall health by increasing exercise and watching caloric intake.    # Possible PAD: Patient with right leg wound and is being worked up for lower extremity vascular disease and was also referred to Vascular Surgery.    # H/o Latent TB: Patient supposedly completed treatment with isoniazid, but not completely sure how many months.  Plan was to refer to Transplant ID, but that hasn't happened.   - Will refer to Transplant ID.    # Skin Cancer Risk:    - Discussed sun protection and recommend regular follow up with Dermatology.    # Medical Compliance: Yes    # Health Maintenance and Vaccination Review: Not Reviewed    # Transplant History:  Etiology of Kidney Failure: Diabetes mellitus type 2  Tx: LDKT  Transplant: 2/1/2023 (Kidney)  Significant changes in immunosuppression: None  Significant transplant-related complications: None    Transplant Office Phone Number: [phone]    Assessment and plan was discussed with the patient and he voiced his understanding and agreement.    Return visit: Return in about 6 months (around 8/1/2024), or Return visti with Dr. Deng.    Gonzalo Gee MD    The longitudinal plan of care for kidney transplant was addressed during this visit. Due to the added complexity in care, I will continue to support Jass Fierro in the subsequent management of this condition(s) and with the ongoing continuity of care of this condition(s).    Chief Complaint  Mr. Fierro is a 73 year old here for kidney transplant and immunosuppression management.    History of Present Illness   Mr. Fierro reports feeling good overall with some medical complaints.  Since last clinic visit, patient reports no hospitalizations or new medical complaints and has been doing well overall.  His energy level is good and remains normal.  Patient is active, but only gets a little exercise during the winter when it is cold out.  Denies any chest pain or shortness of  breath with exertion.  Some increase in leg swelling after stopping the torsemide previously.    Appetite is good and stable weight recently after he had gained ~ 20 lbs early post transplant.  No nausea, vomiting or diarrhea.  No heartburn symptoms.  No fever, sweats or chills.  No night sweats.    Home BP:  140/70-80s    Problem List  Patient Active Problem List   Diagnosis     Diabetes mellitus, type 2 (H)     Nonspecific reaction to tuberculin skin test without active tuberculosis     Anal fissure     HTN, kidney transplant related     TYPE 2 DIABETES, HBA1C GOAL < 7%     CARDIOVASCULAR SCREENING; LDL GOAL LESS THAN 100     Non-proliferative diabetic retinopathy, both eyes (H)     Pseudophakia, right eye     Cataract, left     Kidney replaced by transplant     Aftercare following organ transplant     Chronic kidney disease, stage 2 (mild)     Need for pneumocystis prophylaxis     Immunosuppressed status (H24)     Secondary renal hyperparathyroidism (H24)     Vitamin D deficiency     Hypomagnesemia     Obesity       Allergies  Allergies   Allergen Reactions     Seasonal Allergies Itching       Medications  Current Outpatient Medications   Medication Sig     amLODIPine (NORVASC) 10 MG tablet Take 1 tablet by mouth daily     ASPIRIN 81 MG OR TABS ONE DAILY     atorvastatin (LIPITOR) 40 MG tablet Take 1 tablet by mouth daily     blood glucose monitoring (NO BRAND SPECIFIED) meter device kit Use to test blood sugar 3 times daily or as directed.     Calcium Carb-Cholecalciferol (CALCIUM 500 + D) 500-3.125 MG-MCG TABS Take 1 tablet by mouth daily     insulin aspart (NOVOLOG FLEXPEN) 100 UNIT/ML pen Inject 13 Units Subcutaneous 3 times daily (with meals)     insulin glargine (LANTUS PEN) 100 UNIT/ML pen Inject 30 Units Subcutaneous at bedtime     magnesium oxide (MAG-OX) 400 MG tablet Take 1 tablet (400 mg) by mouth daily Take at 12 PM     metoprolol succinate ER (TOPROL XL) 50 MG 24 hr tablet Take 50 mg by mouth daily      mycophenolic acid (GENERIC EQUIVALENT) 180 MG EC tablet Take 1 tablet (180 mg) by mouth 2 times daily Total dose = 540 mg twice per day     mycophenolic acid (GENERIC EQUIVALENT) 360 MG EC tablet Take 1 tablet (360 mg) by mouth 2 times daily Total dose = 540 mg twice per day     predniSONE (DELTASONE) 5 MG tablet Take 1 tablet (5 mg) by mouth daily     sulfamethoxazole-trimethoprim (BACTRIM) 400-80 MG tablet Take 1 tablet by mouth daily     tacrolimus (GENERIC) 1 MG capsule Take 2 capsules (2 mg) by mouth 2 times daily     torsemide (DEMADEX) 10 MG tablet Take 1 tablet (10 mg) by mouth daily     No current facility-administered medications for this visit.     Medications Discontinued During This Encounter   Medication Reason     acetaminophen (ACETAMINOPHEN 8 HOUR) 650 MG CR tablet      tamsulosin (FLOMAX) 0.4 MG capsule      torsemide (DEMADEX) 10 MG tablet Reorder (No AVS)       Physical Exam  Vital Signs: /63   Pulse 65   Temp 98.3  F (36.8  C) (Oral)   Wt 100.1 kg (220 lb 9.6 oz)   SpO2 98%   BMI 34.21 kg/m      GENERAL APPEARANCE: alert and no distress  HENT: mouth without ulcers or lesions  RESP: lungs clear to auscultation - no rales, rhonchi or wheezes  CV: regular rhythm, normal rate, no rub, no murmur  EDEMA: trace to 1+ LE edema bilaterally  ABDOMEN: soft, nondistended, nontender, bowel sounds normal, obese  MS: extremities normal - no gross deformities noted, no evidence of inflammation in joints, no muscle tenderness  SKIN: no rash  TX KIDNEY: normal  DIALYSIS ACCESS:  RUE AV fistula with good thrill    Data        Latest Ref Rng & Units 2/1/2024     8:18 AM 12/15/2023     7:46 AM 11/16/2023     8:24 AM   Renal   Sodium 135 - 145 mmol/L 139  135  137    K 3.4 - 5.3 mmol/L 4.3  4.5  4.5    Cl 98 - 107 mmol/L 105  102  103    Cl (external) 98 - 107 mmol/L 105  102  103    CO2 22 - 29 mmol/L 25  24  24    Urea Nitrogen 8.0 - 23.0 mg/dL 19.7  18.5  21.0    Creatinine 0.67 - 1.17 mg/dL 1.26   1.26  1.23    Glucose 70 - 99 mg/dL 109  192  162    Calcium 8.8 - 10.2 mg/dL 9.3  9.0  9.4          Latest Ref Rng & Units 2/1/2024     8:18 AM 9/22/2023     8:31 AM 8/15/2023     7:40 AM   Bone Health   Phosphorus 2.5 - 4.5 mg/dL  3.9  3.5    Parathyroid Hormone Intact 15 - 65 pg/mL 101      Vit D Def 20 - 50 ng/mL 26            Latest Ref Rng & Units 2/1/2024     8:20 AM 12/15/2023     7:46 AM 11/16/2023     8:24 AM   Heme   WBC 4.0 - 11.0 10e3/uL 8.7  6.2  9.2    Hgb 13.3 - 17.7 g/dL 12.8  13.3  13.9    Plt 150 - 450 10e3/uL 169  195  196          Latest Ref Rng & Units 2/1/2024     8:18 AM 11/16/2023     8:24 AM 8/15/2023     7:40 AM   Liver   AP 40 - 150 U/L 84  97  128    TBili <=1.2 mg/dL 0.5  0.4  0.6    Bilirubin Direct 0.00 - 0.30 mg/dL <0.20  <0.20  0.20    ALT 0 - 70 U/L 39  33  42    AST 0 - 45 U/L 30  29  31    Tot Protein 6.4 - 8.3 g/dL 6.8  6.9  6.6    Albumin 3.5 - 5.2 g/dL 4.2  4.2  4.1          Latest Ref Rng & Units 2/1/2024     8:18 AM 8/15/2023     7:40 AM 6/20/2023     7:52 AM   Pancreas   A1C <5.7 % 7.5  7.8  8.9          Latest Ref Rng & Units 9/9/2021    12:28 PM 8/12/2011     9:49 AM   Iron studies   Iron 35 - 180 ug/dL 37  55    Iron Saturation Index 15 - 46 % 16  21    Ferritin 26 - 388 ng/mL 364  117          Latest Ref Rng & Units 6/20/2023     7:52 AM 8/12/2011     9:49 AM   [facility] Txp Virology   EBV CAPSID ANTIBODY IGG No detectable antibody. Positive     Hep B Surf   327.0        Recent Labs   Lab Test 11/09/23  0828 11/16/23  0824 12/15/23  0746 02/01/24  0818   DOSTAC 11/8/2023 11/15/2023  --  1/31/2024   TACROL 6.5 9.3 8.7 6.8     Recent Labs   Lab Test 06/20/23  0752   DOSMPA 6/19/2023   7:00 PM   MPACID 0.92*   MPAG 32.9        Again, thank you for allowing me to participate in the care of your patient.        Sincerely,        Gonzalo Gee MD

## 2024-02-01 NOTE — TELEPHONE ENCOUNTER
ISSUE:   Tacrolimus IR level 6.8 on 2/1, goal 4-6, dose 2 mg BID.    PLAN:   Call Patientand confirm this was an accurate 12-hour trough.   Verify Tacrolimus IR dose 2 mg BID.   Confirm no new medications or illness.   Confirm no missed doses.   If accurate trough and accurate dose, decrease Tacrolimus IR dose to 1.5 mg BID     Is this more than a 50% increase or decrease in current IS dose: No  If YES, justification: na    Repeat labs in 1-2 weeks.  *If > 50% change in immunosuppression dose, repeat labs in 1 week.     OUTCOME:

## 2024-02-01 NOTE — PATIENT INSTRUCTIONS
Patient Recommendations:  - Restart torsemide 10 mg daily.  - Recommend weight loss for overall health by increasing exercise and watching caloric intake.   - Recommend routine follow up with Dr. Chalino Bey at [facility] in 3-4 months or so to resume care with a goal of ongoing co-management between your primary Nephrologist and the Transplant Team.     Transplant Patient Information  Your Post Transplant Coordinator is: Stacie Sanchez  For non urgent items, we encourage you to contact your coordinator/care team online via [employer]  You and your care team can also contact your transplant coordinator Monday - Friday, 8am - 5pm at [phone] (Option 2 to reach the coordinator or Option 4 to schedule an appointment).  After hours for urgent matters, please call [facility] at [phone].

## 2024-02-01 NOTE — LETTER
2/1/2024      RE: Jass Fierro  [address]  [facility] [address]       TRANSPLANT NEPHROLOGY CHRONIC POST TRANSPLANT VISIT    Assessment & Plan  # LDKT: Stable   - Baseline Creatinine:  ~ 1.1-1.3   - Proteinuria: Normal (<0.2 grams)   - Date DSA Last Checked: Not Known      Latest DSA: Transplanted at outside Tx Program (Transplanted in [facility])   - BK Viremia: No   - Kidney Tx Biopsy: No    # Immunosuppression: Tacrolimus immediate release (goal 6-8), Mycophenolic acid (dose 540 mg every 12 hours), and Prednisone (dose 5 mg daily)   - Continue with intensive monitoring of immunosuppression for efficacy and toxicity.   - On prednisone due to being transplanted at outside Transplant Program ([facility]) and came back to [place] it.   - Changes: Yes - Now that patient is one year post transplant, will target tacrolimus levels of 4-6.    # Infection Prophylaxis:   - PJP: Sulfa/TMP (Bactrim)    # Hypertension: Borderline control;  Goal BP: < 130/80   - Changes: Yes - With some edema and higher blood pressure, will restart torsemide 10 mg daily.   - If blood pressure still isn't at goal and with patient's DM, would consider starting ACEI/ARB.    # Diabetes: Borderline control (HbA1c 7-9%) Last HbA1c: 7.8%   - Management as per primary care.    # Mineral Bone Disorder:    - Secondary renal hyperparathyroidism; PTH level: Mildly elevated (151-300 pg/ml)        On treatment: None  - Vitamin D; level: Not checked recently        On supplement: Yes  - Calcium; level: Normal        On supplement: Yes    # Electrolytes:   - Potassium; level: Normal        On supplement: No  - Magnesium; level: Not checked recently        On supplement: Yes  - Bicarbonate; level: Normal        On supplement: No    # Obesity, Class I (BMI = 33.5): Stable weight.   - Recommend weight loss for overall health by increasing exercise and watching caloric intake.    # Possible PAD: Patient with right leg wound and is being worked up  for lower extremity vascular disease and was also referred to Vascular Surgery.    # H/o Latent TB: Patient supposedly completed treatment with isoniazid, but not completely sure how many months.  Plan was to refer to Transplant ID, but that hasn't happened.   - Will refer to Transplant ID.    # Skin Cancer Risk:    - Discussed sun protection and recommend regular follow up with Dermatology.    # Medical Compliance: Yes    # Health Maintenance and Vaccination Review: Not Reviewed    # Transplant History:  Etiology of Kidney Failure: Diabetes mellitus type 2  Tx: LDKT  Transplant: 2/1/2023 (Kidney)  Significant changes in immunosuppression: None  Significant transplant-related complications: None    Transplant Office Phone Number: [phone]    Assessment and plan was discussed with the patient and he voiced his understanding and agreement.    Return visit: Return in about 6 months (around 8/1/2024), or Return visti with Dr. Deng.    Gonzalo Gee MD    The longitudinal plan of care for kidney transplant was addressed during this visit. Due to the added complexity in care, I will continue to support Jass Fierro in the subsequent management of this condition(s) and with the ongoing continuity of care of this condition(s).    Chief Complaint  Mr. Fierro is a 73 year old here for kidney transplant and immunosuppression management.    History of Present Illness   Mr. Fierro reports feeling good overall with some medical complaints.  Since last clinic visit, patient reports no hospitalizations or new medical complaints and has been doing well overall.  His energy level is good and remains normal.  Patient is active, but only gets a little exercise during the winter when it is cold out.  Denies any chest pain or shortness of breath with exertion.  Some increase in leg swelling after stopping the torsemide previously.    Appetite is good and stable weight recently after he had gained ~ 20 lbs early  post transplant.  No nausea, vomiting or diarrhea.  No heartburn symptoms.  No fever, sweats or chills.  No night sweats.    Home BP:  140/70-80s    Problem List  Patient Active Problem List   Diagnosis     Diabetes mellitus, type 2 (H)     Nonspecific reaction to tuberculin skin test without active tuberculosis     Anal fissure     HTN, kidney transplant related     TYPE 2 DIABETES, HBA1C GOAL < 7%     CARDIOVASCULAR SCREENING; LDL GOAL LESS THAN 100     Non-proliferative diabetic retinopathy, both eyes (H)     Pseudophakia, right eye     Cataract, left     Kidney replaced by transplant     Aftercare following organ transplant     Chronic kidney disease, stage 2 (mild)     Need for pneumocystis prophylaxis     Immunosuppressed status (H24)     Secondary renal hyperparathyroidism (H24)     Vitamin D deficiency     Hypomagnesemia     Obesity       Allergies  Allergies   Allergen Reactions     Seasonal Allergies Itching       Medications  Current Outpatient Medications   Medication Sig     amLODIPine (NORVASC) 10 MG tablet Take 1 tablet by mouth daily     ASPIRIN 81 MG OR TABS ONE DAILY     atorvastatin (LIPITOR) 40 MG tablet Take 1 tablet by mouth daily     blood glucose monitoring (NO BRAND SPECIFIED) meter device kit Use to test blood sugar 3 times daily or as directed.     Calcium Carb-Cholecalciferol (CALCIUM 500 + D) 500-3.125 MG-MCG TABS Take 1 tablet by mouth daily     insulin aspart (NOVOLOG FLEXPEN) 100 UNIT/ML pen Inject 13 Units Subcutaneous 3 times daily (with meals)     insulin glargine (LANTUS PEN) 100 UNIT/ML pen Inject 30 Units Subcutaneous at bedtime     magnesium oxide (MAG-OX) 400 MG tablet Take 1 tablet (400 mg) by mouth daily Take at 12 PM     metoprolol succinate ER (TOPROL XL) 50 MG 24 hr tablet Take 50 mg by mouth daily     mycophenolic acid (GENERIC EQUIVALENT) 180 MG EC tablet Take 1 tablet (180 mg) by mouth 2 times daily Total dose = 540 mg twice per day     mycophenolic acid (GENERIC  EQUIVALENT) 360 MG EC tablet Take 1 tablet (360 mg) by mouth 2 times daily Total dose = 540 mg twice per day     predniSONE (DELTASONE) 5 MG tablet Take 1 tablet (5 mg) by mouth daily     sulfamethoxazole-trimethoprim (BACTRIM) 400-80 MG tablet Take 1 tablet by mouth daily     tacrolimus (GENERIC) 1 MG capsule Take 2 capsules (2 mg) by mouth 2 times daily     torsemide (DEMADEX) 10 MG tablet Take 1 tablet (10 mg) by mouth daily     No current facility-administered medications for this visit.     Medications Discontinued During This Encounter   Medication Reason     acetaminophen (ACETAMINOPHEN 8 HOUR) 650 MG CR tablet      tamsulosin (FLOMAX) 0.4 MG capsule      torsemide (DEMADEX) 10 MG tablet Reorder (No AVS)       Physical Exam  Vital Signs: /63   Pulse 65   Temp 98.3  F (36.8  C) (Oral)   Wt 100.1 kg (220 lb 9.6 oz)   SpO2 98%   BMI 34.21 kg/m      GENERAL APPEARANCE: alert and no distress  HENT: mouth without ulcers or lesions  RESP: lungs clear to auscultation - no rales, rhonchi or wheezes  CV: regular rhythm, normal rate, no rub, no murmur  EDEMA: trace to 1+ LE edema bilaterally  ABDOMEN: soft, nondistended, nontender, bowel sounds normal, obese  MS: extremities normal - no gross deformities noted, no evidence of inflammation in joints, no muscle tenderness  SKIN: no rash  TX KIDNEY: normal  DIALYSIS ACCESS:  RUE AV fistula with good thrill    Data        Latest Ref Rng & Units 2/1/2024     8:18 AM 12/15/2023     7:46 AM 11/16/2023     8:24 AM   Renal   Sodium 135 - 145 mmol/L 139  135  137    K 3.4 - 5.3 mmol/L 4.3  4.5  4.5    Cl 98 - 107 mmol/L 105  102  103    Cl (external) 98 - 107 mmol/L 105  102  103    CO2 22 - 29 mmol/L 25  24  24    Urea Nitrogen 8.0 - 23.0 mg/dL 19.7  18.5  21.0    Creatinine 0.67 - 1.17 mg/dL 1.26  1.26  1.23    Glucose 70 - 99 mg/dL 109  192  162    Calcium 8.8 - 10.2 mg/dL 9.3  9.0  9.4          Latest Ref Rng & Units 2/1/2024     8:18 AM 9/22/2023     8:31 AM  8/15/2023     7:40 AM   Bone Health   Phosphorus 2.5 - 4.5 mg/dL  3.9  3.5    Parathyroid Hormone Intact 15 - 65 pg/mL 101      Vit D Def 20 - 50 ng/mL 26            Latest Ref Rng & Units 2/1/2024     8:20 AM 12/15/2023     7:46 AM 11/16/2023     8:24 AM   Heme   WBC 4.0 - 11.0 10e3/uL 8.7  6.2  9.2    Hgb 13.3 - 17.7 g/dL 12.8  13.3  13.9    Plt 150 - 450 10e3/uL 169  195  196          Latest Ref Rng & Units 2/1/2024     8:18 AM 11/16/2023     8:24 AM 8/15/2023     7:40 AM   Liver   AP 40 - 150 U/L 84  97  128    TBili <=1.2 mg/dL 0.5  0.4  0.6    Bilirubin Direct 0.00 - 0.30 mg/dL <0.20  <0.20  0.20    ALT 0 - 70 U/L 39  33  42    AST 0 - 45 U/L 30  29  31    Tot Protein 6.4 - 8.3 g/dL 6.8  6.9  6.6    Albumin 3.5 - 5.2 g/dL 4.2  4.2  4.1          Latest Ref Rng & Units 2/1/2024     8:18 AM 8/15/2023     7:40 AM 6/20/2023     7:52 AM   Pancreas   A1C <5.7 % 7.5  7.8  8.9          Latest Ref Rng & Units 9/9/2021    12:28 PM 8/12/2011     9:49 AM   Iron studies   Iron 35 - 180 ug/dL 37  55    Iron Saturation Index 15 - 46 % 16  21    Ferritin 26 - 388 ng/mL 364  117          Latest Ref Rng & Units 6/20/2023     7:52 AM 8/12/2011     9:49 AM   [facility] Txp Virology   EBV CAPSID ANTIBODY IGG No detectable antibody. Positive     Hep B Surf   327.0        Recent Labs   Lab Test 11/09/23  0828 11/16/23  0824 12/15/23  0746 02/01/24  0818   DOSTAC 11/8/2023 11/15/2023  --  1/31/2024   TACROL 6.5 9.3 8.7 6.8     Recent Labs   Lab Test 06/20/23  0752   DOSMPA 6/19/2023   7:00 PM   MPACID 0.92*   MPAG 32.9          Gonzalo Gee MD

## 2024-02-01 NOTE — TELEPHONE ENCOUNTER
Left message and sent [employer]ta message to patient regarding:  Tacrolimus IR level 6.8 on 2/1, goal 4-6, dose 2 mg BID.     PLAN:   Call Patientand confirm this was an accurate 12-hour trough.   Verify Tacrolimus IR dose 2 mg BID.   Confirm no new medications or illness.   Confirm no missed doses.   If accurate trough and accurate dose, decrease Tacrolimus IR dose to 1.5 mg BID      Is this more than a 50% increase or decrease in current IS dose: No  If YES, justification: na     Repeat labs in 1-2 weeks.

## 2024-02-01 NOTE — NURSING NOTE
Chief Complaint   Patient presents with    RECHECK     12 mo f/u       /63   Pulse 65   Temp 98.3  F (36.8  C) (Oral)   Wt 100.1 kg (220 lb 9.6 oz)   SpO2 98%   BMI 34.21 kg/m      Nabil Cook on 2/1/2024 at 8:58 AM

## 2024-02-02 ENCOUNTER — TELEPHONE (OUTPATIENT)
Dept: TRANSPLANT | Facility: CLINIC | Age: 74
End: 2024-02-02
Payer: COMMERCIAL

## 2024-02-02 DIAGNOSIS — Z48.298 AFTERCARE FOLLOWING ORGAN TRANSPLANT: Primary | ICD-10-CM

## 2024-02-02 DIAGNOSIS — Z79.899 OTHER LONG TERM (CURRENT) DRUG THERAPY: ICD-10-CM

## 2024-02-02 PROBLEM — D84.9 IMMUNOSUPPRESSED STATUS (H): Status: ACTIVE | Noted: 2024-02-02

## 2024-02-02 PROBLEM — E83.42 HYPOMAGNESEMIA: Status: ACTIVE | Noted: 2024-02-02

## 2024-02-02 PROBLEM — E55.9 VITAMIN D DEFICIENCY: Status: ACTIVE | Noted: 2024-02-02

## 2024-02-02 PROBLEM — Z29.89 NEED FOR PNEUMOCYSTIS PROPHYLAXIS: Status: ACTIVE | Noted: 2024-02-02

## 2024-02-02 PROBLEM — N25.81 SECONDARY RENAL HYPERPARATHYROIDISM (H): Status: ACTIVE | Noted: 2024-02-02

## 2024-02-02 PROBLEM — N18.2 CHRONIC KIDNEY DISEASE, STAGE 2 (MILD): Status: ACTIVE | Noted: 2024-02-02

## 2024-02-02 PROBLEM — Z94.0 KIDNEY REPLACED BY TRANSPLANT: Status: ACTIVE | Noted: 2024-02-02

## 2024-02-02 PROBLEM — E66.9 OBESITY: Status: ACTIVE | Noted: 2024-02-02

## 2024-02-02 NOTE — PROGRESS NOTES
TRANSPLANT NEPHROLOGY CHRONIC POST TRANSPLANT VISIT    Assessment & Plan   # LDKT: Stable   - Baseline Creatinine:  ~ 1.1-1.3   - Proteinuria: Normal (<0.2 grams)   - Date DSA Last Checked: Not Known      Latest DSA: Transplanted at outside Tx Program (Transplanted in [facility])   - BK Viremia: No   - Kidney Tx Biopsy: No    # Immunosuppression: Tacrolimus immediate release (goal 6-8), Mycophenolic acid (dose 540 mg every 12 hours), and Prednisone (dose 5 mg daily)   - Continue with intensive monitoring of immunosuppression for efficacy and toxicity.   - On prednisone due to being transplanted at outside Transplant Program ([facility]) and came back to [place] on it.   - Changes: Yes - Now that patient is one year post transplant, will target tacrolimus levels of 4-6.    # Infection Prophylaxis:   - PJP: Sulfa/TMP (Bactrim)    # Hypertension: Borderline control;  Goal BP: < 130/80   - Changes: Yes - With some edema and higher blood pressure, will restart torsemide 10 mg daily.   - If blood pressure still isn't at goal and with patient's DM, would consider starting ACEI/ARB.    # Diabetes: Borderline control (HbA1c 7-9%) Last HbA1c: 7.8%   - Management as per primary care.    # Mineral Bone Disorder:    - Secondary renal hyperparathyroidism; PTH level: Mildly elevated (151-300 pg/ml)        On treatment: None  - Vitamin D; level: Not checked recently        On supplement: Yes  - Calcium; level: Normal        On supplement: Yes    # Electrolytes:   - Potassium; level: Normal        On supplement: No  - Magnesium; level: Not checked recently        On supplement: Yes  - Bicarbonate; level: Normal        On supplement: No    # Obesity, Class I (BMI = 33.5): Stable weight.   - Recommend weight loss for overall health by increasing exercise and watching caloric intake.    # Possible PAD: Patient with right leg wound and is being worked up for lower extremity vascular disease and was also referred to Vascular Surgery.    # H/o Latent  TB: Patient supposedly completed treatment with isoniazid, but not completely sure how many months.  Plan was to refer to Transplant ID, but that hasn't happened.   - Will refer to Transplant ID.    # Skin Cancer Risk:    - Discussed sun protection and recommend regular follow up with Dermatology.    # Medical Compliance: Yes    # Health Maintenance and Vaccination Review: Not Reviewed    # Transplant History:  Etiology of Kidney Failure: Diabetes mellitus type 2  Tx: LDKT  Transplant: 2/1/2023 (Kidney)  Significant changes in immunosuppression: None  Significant transplant-related complications: None    Transplant Office Phone Number: [phone]    Assessment and plan was discussed with the patient and he voiced his understanding and agreement.    Return visit: Return in about 6 months (around 8/1/2024), or Return visti with Dr. Deng.    Gonzalo Gee MD    The longitudinal plan of care for kidney transplant was addressed during this visit. Due to the added complexity in care, I will continue to support Jass Fierro in the subsequent management of this condition(s) and with the ongoing continuity of care of this condition(s).    Chief Complaint   Mr. Fierro is a 73 year old here for kidney transplant and immunosuppression management.    History of Present Illness    Mr. Fierro reports feeling good overall with some medical complaints.  Since last clinic visit, patient reports no hospitalizations or new medical complaints and has been doing well overall.  His energy level is good and remains normal.  Patient is active, but only gets a little exercise during the winter when it is cold out.  Denies any chest pain or shortness of breath with exertion.  Some increase in leg swelling after stopping the torsemide previously.    Appetite is good and stable weight recently after he had gained ~ 20 lbs early post transplant.  No nausea, vomiting or diarrhea.  No heartburn symptoms.  No fever, sweats or  chills.  No night sweats.    Home BP:  140/70-80s    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 tuberculosis    Anal fissure    HTN, kidney transplant related    TYPE 2 DIABETES, HBA1C GOAL < 7%    CARDIOVASCULAR SCREENING; LDL GOAL LESS THAN 100    Non-proliferative diabetic retinopathy, both eyes (H)    Pseudophakia, right eye    Cataract, left    Kidney replaced by transplant    Aftercare following organ transplant    Chronic kidney disease, stage 2 (mild)    Need for pneumocystis prophylaxis    Immunosuppressed status (H24)    Secondary renal hyperparathyroidism (H24)    Vitamin D deficiency    Hypomagnesemia    Obesity       Allergies   Allergies   Allergen Reactions    Seasonal Allergies Itching       Medications   Current Outpatient Medications   Medication Sig    amLODIPine (NORVASC) 10 MG tablet Take 1 tablet by mouth daily    ASPIRIN 81 MG OR TABS ONE DAILY    atorvastatin (LIPITOR) 40 MG tablet Take 1 tablet by mouth daily    blood glucose monitoring (NO BRAND SPECIFIED) meter device kit Use to test blood sugar 3 times daily or as directed.    Calcium Carb-Cholecalciferol (CALCIUM 500 + D) 500-3.125 MG-MCG TABS Take 1 tablet by mouth daily    insulin aspart (NOVOLOG FLEXPEN) 100 UNIT/ML pen Inject 13 Units Subcutaneous 3 times daily (with meals)    insulin glargine (LANTUS PEN) 100 UNIT/ML pen Inject 30 Units Subcutaneous at bedtime    magnesium oxide (MAG-OX) 400 MG tablet Take 1 tablet (400 mg) by mouth daily Take at 12 PM    metoprolol succinate ER (TOPROL XL) 50 MG 24 hr tablet Take 50 mg by mouth daily    mycophenolic acid (GENERIC EQUIVALENT) 180 MG EC tablet Take 1 tablet (180 mg) by mouth 2 times daily Total dose = 540 mg twice per day    mycophenolic acid (GENERIC EQUIVALENT) 360 MG EC tablet Take 1 tablet (360 mg) by mouth 2 times daily Total dose = 540 mg twice per day    predniSONE  (DELTASONE) 5 MG tablet Take 1 tablet (5 mg) by mouth daily    sulfamethoxazole-trimethoprim (BACTRIM) 400-80 MG tablet Take 1 tablet by mouth daily    tacrolimus (GENERIC) 1 MG capsule Take 2 capsules (2 mg) by mouth 2 times daily    torsemide (DEMADEX) 10 MG tablet Take 1 tablet (10 mg) by mouth daily     No current facility-administered medications for this visit.     Medications Discontinued During This Encounter   Medication Reason    acetaminophen (ACETAMINOPHEN 8 HOUR) 650 MG CR tablet     tamsulosin (FLOMAX) 0.4 MG capsule     torsemide (DEMADEX) 10 MG tablet Reorder (No AVS)       Physical Exam   Vital Signs: /63   Pulse 65   Temp 98.3  F (36.8  C) (Oral)   Wt 100.1 kg (220 lb 9.6 oz)   SpO2 98%   BMI 34.21 kg/m      GENERAL APPEARANCE: alert and no distress  HENT: mouth without ulcers or lesions  RESP: lungs clear to auscultation - no rales, rhonchi or wheezes  CV: regular rhythm, normal rate, no rub, no murmur  EDEMA: trace to 1+ LE edema bilaterally  ABDOMEN: soft, nondistended, nontender, bowel sounds normal, obese  MS: extremities normal - no gross deformities noted, no evidence of inflammation in joints, no muscle tenderness  SKIN: no rash  TX KIDNEY: normal  DIALYSIS ACCESS:  RUE AV fistula with good thrill    Data         Latest Ref Rng & Units 2/1/2024     8:18 AM 12/15/2023     7:46 AM 11/16/2023     8:24 AM   Renal   Sodium 135 - 145 mmol/L 139  135  137    K 3.4 - 5.3 mmol/L 4.3  4.5  4.5    Cl 98 - 107 mmol/L 105  102  103    Cl (external) 98 - 107 mmol/L 105  102  103    CO2 22 - 29 mmol/L 25  24  24    Urea Nitrogen 8.0 - 23.0 mg/dL 19.7  18.5  21.0    Creatinine 0.67 - 1.17 mg/dL 1.26  1.26  1.23    Glucose 70 - 99 mg/dL 109  192  162    Calcium 8.8 - 10.2 mg/dL 9.3  9.0  9.4          Latest Ref Rng & Units 2/1/2024     8:18 AM 9/22/2023     8:31 AM 8/15/2023     7:40 AM   Bone Health   Phosphorus 2.5 - 4.5 mg/dL  3.9  3.5    Parathyroid Hormone Intact 15 - 65 pg/mL 101      Vit D  Def 20 - 50 ng/mL 26            Latest Ref Rng & Units 2/1/2024     8:20 AM 12/15/2023     7:46 AM 11/16/2023     8:24 AM   Heme   WBC 4.0 - 11.0 10e3/uL 8.7  6.2  9.2    Hgb 13.3 - 17.7 g/dL 12.8  13.3  13.9    Plt 150 - 450 10e3/uL 169  195  196          Latest Ref Rng & Units 2/1/2024     8:18 AM 11/16/2023     8:24 AM 8/15/2023     7:40 AM   Liver   AP 40 - 150 U/L 84  97  128    TBili <=1.2 mg/dL 0.5  0.4  0.6    Bilirubin Direct 0.00 - 0.30 mg/dL <0.20  <0.20  0.20    ALT 0 - 70 U/L 39  33  42    AST 0 - 45 U/L 30  29  31    Tot Protein 6.4 - 8.3 g/dL 6.8  6.9  6.6    Albumin 3.5 - 5.2 g/dL 4.2  4.2  4.1          Latest Ref Rng & Units 2/1/2024     8:18 AM 8/15/2023     7:40 AM 6/20/2023     7:52 AM   Pancreas   A1C <5.7 % 7.5  7.8  8.9          Latest Ref Rng & Units 9/9/2021    12:28 PM 8/12/2011     9:49 AM   Iron studies   Iron 35 - 180 ug/dL 37  55    Iron Saturation Index 15 - 46 % 16  21    Ferritin 26 - 388 ng/mL 364  117          Latest Ref Rng & Units 6/20/2023     7:52 AM 8/12/2011     9:49 AM   [facility] Txp Virology   EBV CAPSID ANTIBODY IGG No detectable antibody. Positive     Hep B Surf   327.0        Recent Labs   Lab Test 11/09/23  0828 11/16/23  0824 12/15/23  0746 02/01/24  0818   DOSTAC 11/8/2023 11/15/2023  --  1/31/2024   TACROL 6.5 9.3 8.7 6.8     Recent Labs   Lab Test 06/20/23  0752   DOSMPA 6/19/2023   7:00 PM   MPACID 0.92*   MPAG 32.9

## 2024-02-02 NOTE — TELEPHONE ENCOUNTER
----- Message from Gonzalo Gee MD sent at 2/2/2024 11:21 AM CST -----  Regarding: Clinic visit  Please check the following labs: PTH, Vitamin D, Magnesium, and CD4 level.    If CD4 level is greater than 200, patient can stop Bactrim.    I believe Dr. Deng was referring the patient to ID to confirm he was fully treated for latent TB, but I don't seen that appointment done.    He will follow up with Dr. Deng in 6 months.    Zana

## 2024-02-05 RX ORDER — TACROLIMUS 0.5 MG/1
0.5 CAPSULE ORAL 2 TIMES DAILY
Qty: 180 CAPSULE | Refills: 3 | Status: SHIPPED | OUTPATIENT
Start: 2024-02-05

## 2024-02-05 RX ORDER — TACROLIMUS 1 MG/1
1 CAPSULE ORAL 2 TIMES DAILY
Qty: 180 CAPSULE | Refills: 3 | Status: SHIPPED | OUTPATIENT
Start: 2024-02-05

## 2024-02-05 NOTE — TELEPHONE ENCOUNTER
Call placed to patient via  services. Patient confirms current dose and accurate trough level. Denies any recent illness, diarrhea or medication changes. Patient v\u to decrease tacrolimus dose to 1.5 mg bid and repeat level in 1-2 weeks. Order/rx sent.     Call placed to patient son. He v\u to decrease patient tacrolimus dose to 1.5 mg bid and ensure level repeated in 1-2 weeks.

## 2024-02-07 NOTE — TELEPHONE ENCOUNTER
Updated lab orders. Regarding ID referral, patient was called with max attempts back in November when order was placed to schedule. Patient did not answer or call back. Will send message for scheduling team to try again.

## 2024-02-12 ENCOUNTER — ANCILLARY PROCEDURE (OUTPATIENT)
Dept: ULTRASOUND IMAGING | Facility: CLINIC | Age: 74
End: 2024-02-12
Attending: RADIOLOGY
Payer: COMMERCIAL

## 2024-02-12 DIAGNOSIS — R09.89 OTHER SPECIFIED SYMPTOMS AND SIGNS INVOLVING THE CIRCULATORY AND RESPIRATORY SYSTEMS: ICD-10-CM

## 2024-02-12 DIAGNOSIS — I70.209 ATHEROSCLEROSIS OF NATIVE ARTERY OF EXTREMITY (H): ICD-10-CM

## 2024-02-12 PROCEDURE — 99207 US TCO2 BILATERAL: CPT | Mod: 52 | Performed by: RADIOLOGY

## 2024-02-12 PROCEDURE — 93926 LOWER EXTREMITY STUDY: CPT | Mod: RT | Performed by: RADIOLOGY

## 2024-02-12 PROCEDURE — 93922 UPR/L XTREMITY ART 2 LEVELS: CPT | Mod: GC | Performed by: RADIOLOGY

## 2024-02-13 ENCOUNTER — OFFICE VISIT (OUTPATIENT)
Dept: VASCULAR SURGERY | Facility: CLINIC | Age: 74
End: 2024-02-13
Attending: INTERNAL MEDICINE
Payer: COMMERCIAL

## 2024-02-13 VITALS — DIASTOLIC BLOOD PRESSURE: 71 MMHG | HEART RATE: 71 BPM | SYSTOLIC BLOOD PRESSURE: 119 MMHG | OXYGEN SATURATION: 98 %

## 2024-02-13 DIAGNOSIS — I70.213 ATHEROSCLEROSIS OF NATIVE ARTERY OF BOTH LOWER EXTREMITIES WITH INTERMITTENT CLAUDICATION (H): Primary | ICD-10-CM

## 2024-02-13 DIAGNOSIS — I99.9 PERIPHERAL VASCULAR COMPLICATION: ICD-10-CM

## 2024-02-13 PROCEDURE — 99203 OFFICE O/P NEW LOW 30 MIN: CPT | Mod: GC | Performed by: RADIOLOGY

## 2024-02-13 NOTE — PATIENT INSTRUCTIONS
Sri you have had your consult today with Dr Urbina IR/vascular regarding your PAD.    Plan:    We have given you a prescription for knee high compression stockings, please wear daily for leg edema.  Take this prescription with you to a medical supply company.    We will see you back in 3 months with Left arterial duplex and visit with Dr Urbina.    A wound care referral was placed they should be calling you for an appointment.    Thanks,     AFelicita Dudley RN, BSN  Interventional Radiology Care Coordinator   Phone:  [phone]

## 2024-02-13 NOTE — PROGRESS NOTES
"    INTERVENTIONAL RADIOLOGY CONSULTATION    Name: Jass Fierro  Age: 73 year old   Referring Physician: Dr. Zimmer   REASON FOR REFERRAL: R. Lower leg Wound      HPI: Jass Fierro is a 73 year old male with a PMH of ESKD, T2DM, post-kidney transplant, HTN here for on-going, non-healing right lower leg wound along with LLE numbness and tingling. Pt states he is most concerned with LLE numbness and tingling, which has been happening for \"a few years\". States that he gets numbness and tingling along the butt/rectum, thigh, and calf with walking. He can only walk ~300 steps and the numbness/tingling improves with rest. Also endorses some fatigue in the left leg with walking. No cramping. Pt also has a right lower leg wound that occurred a few months ago after an insect bite. He has been able to express some puss/fluid, but the wound hasn't gotten worse or bigger.     Pt endorses brief hx of smoking 40 years ago.Smoke 2-3 cigarettes/day for 10 years.     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     finished 9 months INH 7/03-4/04    Type II or unspecified type diabetes mellitus without mention of complication, not stated as uncontrolled     Unspecified essential hypertension        PAST SURGICAL HISTORY:   Past Surgical History:   Procedure Laterality Date    CATARACT IOL, RT/LT Right 2012    doctor and clinic unknown to pt    PHACOEMULSIFICATION WITH STANDARD INTRAOCULAR LENS IMPLANT Left 3/1/2019    Procedure: Left Eye Cataract Removal with Intraocular Lens Implant;  Surgeon: Kendal Vazquez MD;  Location:  OR    [facility] NONSPECIFIC PROCEDURE  03/2002    Fistulotomy + partial lat. internal sphincterotomy       FAMILY HISTORY:   Family History   Problem Relation Age of Onset    Family History " Negative Other     Diabetes Other     Hypertension Other     Diabetes Mother     Diabetes Father     Hypertension Father     Hypertension Brother     Cancer No family hx of     Glaucoma No family hx of        SOCIAL HISTORY:   Social History     Tobacco Use    Smoking status: Never    Smokeless tobacco: Not on file   Substance Use Topics    Alcohol use: No       PROBLEM LIST:   Patient Active Problem List    Diagnosis Date Noted    Kidney replaced by transplant 02/02/2024     Priority: Medium    Aftercare following organ transplant 02/02/2024     Priority: Medium    Chronic kidney disease, stage 2 (mild) 02/02/2024     Priority: Medium    Need for pneumocystis prophylaxis 02/02/2024     Priority: Medium    Immunosuppressed status (H24) 02/02/2024     Priority: Medium    Secondary renal hyperparathyroidism (H24) 02/02/2024     Priority: Medium    Vitamin D deficiency 02/02/2024     Priority: Medium    Hypomagnesemia 02/02/2024     Priority: Medium    Obesity 02/02/2024     Priority: Medium    Non-proliferative diabetic retinopathy, both eyes (H) 09/16/2015     Priority: Medium    Pseudophakia, right eye 09/16/2015     Priority: Medium    Cataract, left 09/16/2015     Priority: Medium    TYPE 2 DIABETES, HBA1C GOAL < 7% 10/31/2010     Priority: Medium    CARDIOVASCULAR SCREENING; LDL GOAL LESS THAN 100 10/31/2010     Priority: Medium    Diabetes mellitus, type 2 (H) 04/22/2004     Priority: Medium     Problem list name updated by automated process. 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 Provider to review and confirm  Problem list name updated by automated process. Provider to review      Anal fissure 04/22/2004     Priority: Medium    HTN, kidney transplant related 04/22/2004     Priority: Medium     Problem list name updated by automated process. Provider to review          MEDICATIONS:   Prescription Medications as of 2/13/2024         Rx Number Disp Refills Start End Last Dispensed Date Next Fill Date Owning Pharmacy    amLODIPine (NORVASC) 10 MG tablet  -- -- 6/6/2023 --       Sig: Take 1 tablet by mouth daily    Class: Historical    Route: Oral    ASPIRIN 81 MG OR TABS  100 3 3/3/2008 --   [id number] - [place], [place] - [address] AT [facility] [address]    Sig: ONE DAILY    Class: Fax    Route: Oral    atorvastatin (LIPITOR) 40 MG tablet  -- -- 6/6/2023 --       Sig: Take 1 tablet by mouth daily    Class: Historical    Route: Oral    blood glucose monitoring (NO BRAND SPECIFIED) meter device kit  1 kit 0 6/20/2023 --   [facility] #[id number] - [place], [place] - [address]    Sig: Use to test blood sugar 3 times daily or as directed.    Class: E-Prescribe    Calcium Carb-Cholecalciferol (CALCIUM 500 + D) 500-3.125 MG-MCG TABS  -- -- 8/1/2023 --       Sig: Take 1 tablet by mouth daily    Class: Historical    Route: Oral    insulin aspart (NOVOLOG FLEXPEN) 100 UNIT/ML pen  -- --  --       Sig: Inject 13 Units Subcutaneous 3 times daily (with meals)    Class: Historical    Route: Subcutaneous    insulin glargine (LANTUS PEN) 100 UNIT/ML pen  -- --  --       Sig: Inject 30 Units Subcutaneous at bedtime    Class: Historical    Route: Subcutaneous    magnesium oxide (MAG-OX) 400 MG tablet  90 tablet 3 9/26/2023 --   [id number] - [place], [place] - [address]    Sig: Take 1 tablet (400 mg) by mouth daily Take at 12 PM    Class: E-Prescribe    Route: Oral    metoprolol succinate ER (TOPROL XL) 50 MG 24 hr tablet  -- --  --       Sig: Take 50 mg by mouth daily    Class: Historical    Route: Oral    mycophenolic acid (GENERIC EQUIVALENT) 180 MG EC tablet  60 tablet 11 12/28/2023 --   [facility] - [place], [place] - [address] [facility] [id number]    Sig: Take 1 tablet (180 mg) by mouth 2 times daily Total dose = 540 mg twice per day     Class: E-Prescribe    Notes to Pharmacy: TXP DT 2/1/2023 (Kidney) TXP Dischg DT  DX Kidney replaced by transplant Z94.0 [facility]    Route: Oral    mycophenolic acid (GENERIC EQUIVALENT) 360 MG EC tablet  60 tablet 11 12/28/2023 --   [facility] - [place], [place] - [address] [place] [address] [id number]    Sig: Take 1 tablet (360 mg) by mouth 2 times daily Total dose = 540 mg twice per day    Class: E-Prescribe    Notes to Pharmacy: TXP DT 2/1/2023 (Kidney) TXP Dischg DT  DX Kidney replaced by transplant Z94.0 [facility]    Route: Oral    predniSONE (DELTASONE) 5 MG tablet  90 tablet 3 7/3/2023 --   [id number] - [place], [place] - [address]    Sig: Take 1 tablet (5 mg) by mouth daily    Class: E-Prescribe    Route: Oral    sulfamethoxazole-trimethoprim (BACTRIM) 400-80 MG tablet  90 tablet 3 7/6/2023 --   [facility] #[id number] [place], [place] - [address]    Sig: Take 1 tablet by mouth daily    Class: E-Prescribe    Route: Oral    tacrolimus (GENERIC) 0.5 MG capsule  180 capsule 3 2/5/2024 --   [id number] - [place], [place] - [address]    Sig: Take 1 capsule (0.5 mg) by mouth 2 times daily Total dose = 1.5 mg every 12 hours    Class: E-Prescribe    Notes to Pharmacy: TXP DT 2/1/2023 (Kidney) TXP Dischg DT  DX Kidney replaced by transplant Z94.0 [facility]    Route: Oral    tacrolimus (GENERIC) 1 MG capsule  180 capsule 3 2/5/2024 --   [id number] [place], [place] - [address]    Sig: Take 1 capsule (1 mg) by mouth 2 times daily Total dose = 1.5 mg every 12 hours    Class: E-Prescribe    Notes to Pharmacy: TXP DT 2/1/2023 (Kidney) TXP Dischg DT  DX Kidney replaced by transplant Z94.0 [facility]    Route: Oral    torsemide (DEMADEX) 10 MG tablet  90 tablet 1 2/1/2024 --   [facility] - [place], [place] - [facility] [id number]    Sig: Take 1 tablet (10 mg) by mouth daily    Class: E-Prescribe    Route: Oral       "      ALLERGIES:   Seasonal allergies    ROS:  Negative unless otherwise stated in HPI.      Physical Examination:   VITALS:   There were no vitals taken for this visit.    CONSTITUTIONAL: healthy, alert and no distress.   PSYCHIATRIC: mentation appears normal and affect normal.   NEURO: Normal movements and speech.   EYES: No jaundice or pallor.   SKIN: RLE: 3x2cm white wound. No purulence. +1 Edema. +1 DP and TP. LLE: +1 edema and 1+ DP and TP.   RESP: non-labored breathing    Labs:    BMP RESULTS:  Lab Results   Component Value Date     02/01/2024     05/07/2012    POTASSIUM 4.3 02/01/2024    POTASSIUM 4.4 08/06/2021    POTASSIUM 5.2 05/07/2012    CHLORIDE 105 02/01/2024    CHLORIDE 104 08/06/2021    CHLORIDE 105 05/07/2012    CO2 25 02/01/2024    CO2 17 (L) 08/06/2021    CO2 23 05/07/2012    ANIONGAP 9 02/01/2024    ANIONGAP 11 08/06/2021    ANIONGAP 11 05/07/2012     (H) 02/01/2024     (H) 06/14/2023     (H) 08/06/2021     (H) 05/07/2012    BUN 19.7 02/01/2024    BUN 77 (H) 08/06/2021    BUN 49 (H) 05/07/2012    CR 1.26 (H) 02/01/2024    CR 3.74 (H) 05/07/2012    GFRESTIMATED 59 (L) 02/01/2024    GFRESTIMATED 16 (L) 05/07/2012    GFRESTBLACK 20 (L) 05/07/2012    BOB 9.3 02/01/2024    BOB 9.2 05/07/2012        CBC RESULTS:  Lab Results   Component Value Date    WBC 8.7 02/01/2024    WBC 7.5 05/07/2012    RBC 4.50 02/01/2024    RBC 4.47 05/07/2012    HGB 12.8 (L) 02/01/2024    HGB 12.5 (L) 05/07/2012    HCT 39.5 (L) 02/01/2024    HCT 37.1 (L) 05/07/2012    MCV 88 02/01/2024    MCV 83 05/07/2012    MCH 28.4 02/01/2024    MCH 28.0 05/07/2012    MCHC 32.4 02/01/2024    MCHC 33.7 05/07/2012    RDW 13.9 02/01/2024    RDW 14.7 05/07/2012     02/01/2024     05/07/2012       INR/PTT:  No results found for: \"INR\", \"PTT\"    Diagnostic studies:     ULTRASOUND LOWER EXTREMITY ARTERIAL DUPLEX RIGHT 2/12/2024   IMPRESSION:   1. Distal right superficial femoral artery " "velocity exceeds 300 cm/s,  but velocity ratio is less than 3 and 62% diameter stenosis suggested  in color Doppler image. 50-75% diameter stenosis.   2. Arrythmia.    RESTING US ESTEBAN Doppler No Exercise 2/12/2024   IMPRESSION:  1. RIGHT:       A. Resting ESTEBAN is non compressible.       B. Resting TBI is ABNORMAL, 0.29.     2. LEFT:       A. Resting ESTEBAN is non compressible.       B. Resting TBI is ABNORMAL, 0.19.    LOWER EXTREMITY TRANSCUTANEOUS OXIMETRY (TCO2) 2/12/2024   IMPRESSION: Adequate wound healing capability in bilateral feet.       Assessment Jass Fierro is a 73 year old male with a PMH of ESKD, T2DM, post-kidney transplant, HTN here for on-going, non-healing right lower leg wound     #RLE Wound  Acute on chronic 3x2cm RLE wound. No evidence of purulence. Wound has not worsened. Surrounding edema may be preventing full healing of wound. PAD of RLE likely not contributing to wound as TCO2 is normal.    Plan:   -Refer to wound clinic   -Compression stockings   -See back in 3 months and re-consider intervention on that side if wound does not improve    #LLE radiculopathy vs claudication  Chronic numbness and tingling from hip/glute region to calf. Description of pain appears to be more radicular, however straight leg test normal and pt does describe some symptoms that align with claudication. Pt could have some degree of PAD in left extremity.     Plan   -US arterial duplex of left leg    I saw and examined the patient with the medical student and resident and agree to the assessment and plan.    Normal TCO2, may have small contribution of arterial insufficiency in delayed wound healing but suspect the wound will heal with proper compression and local wound care.     Left leg symptoms sound atypical for claudication given the \"tingling and numbness\" nature. Will defer until the right leg wound is treated properly. If the wound persists despite aggressive wound care and compression can proceed to " angiography and intervention.    CC  Patient Care Team:  Natalia Bonilla MD as PCP - General (Internal Medicine)  José Miguel Camarillo MD as MD (Ophthalmology)  Natalia Bonilla MD (Internal Medicine)  Kendal Vazquez MD as MD (Ophthalmology)  Sarthak Nunez Chi, OD as MD (Optometry)  Sarah Solano MD as MD (Ophthalmology)  Elizabeth Hope MD as MD (Nephrology)  Zaid Jacob MD as Assigned Nephrology Provider  Agapito Ramírez RPH as Pharmacist (Pharmacist)  Agapito Ramírez RP as Assigned [place] Pharmacist  Ceferino aPrikh, OD as MD (Optometry)  NATALIA BONILLA

## 2024-02-13 NOTE — LETTER
"2/13/2024       RE: Jass Fierro  [address]  [facility] [address]     Dear Colleague,    Thank you for referring your patient, Jass Fierro, to the [facility] VASCULAR CLINIC [place] at [facility]. Please see a copy of my visit note below.        INTERVENTIONAL RADIOLOGY CONSULTATION    Name: Jass Fierro  Age: 73 year old   Referring Physician: Dr. Zimmer   REASON FOR REFERRAL: R. Lower leg Wound      HPI: Jass Fierro is a 73 year old male with a PMH of ESKD, T2DM, post-kidney transplant, HTN here for on-going, non-healing right lower leg wound along with LLE numbness and tingling. Pt states he is most concerned with LLE numbness and tingling, which has been happening for \"a few years\". States that he gets numbness and tingling along the butt/rectum, thigh, and calf with walking. He can only walk ~300 steps and the numbness/tingling improves with rest. Also endorses some fatigue in the left leg with walking. No cramping. Pt also has a right lower leg wound that occurred a few months ago after an insect bite. He has been able to express some puss/fluid, but the wound hasn't gotten worse or bigger.     Pt endorses brief hx of smoking 40 years ago.Smoke 2-3 cigarettes/day for 10 years.     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     finished 9 months INH 7/03-4/04    Type II or unspecified type diabetes mellitus without mention of complication, not stated as uncontrolled     Unspecified essential hypertension        PAST SURGICAL HISTORY:   Past Surgical History:   Procedure Laterality Date    CATARACT IOL, RT/LT Right 2012    doctor and clinic unknown to pt    PHACOEMULSIFICATION WITH STANDARD INTRAOCULAR " LENS IMPLANT Left 3/1/2019    Procedure: Left Eye Cataract Removal with Intraocular Lens Implant;  Surgeon: Kendal Vazquez MD;  Location:  OR    [facility] NONSPECIFIC PROCEDURE  03/2002    Fistulotomy + partial lat. internal sphincterotomy       FAMILY HISTORY:   Family History   Problem Relation Age of Onset    Family History Negative Other     Diabetes Other     Hypertension Other     Diabetes Mother     Diabetes Father     Hypertension Father     Hypertension Brother     Cancer No family hx of     Glaucoma No family hx of        SOCIAL HISTORY:   Social History     Tobacco Use    Smoking status: Never    Smokeless tobacco: Not on file   Substance Use Topics    Alcohol use: No       PROBLEM LIST:   Patient Active Problem List    Diagnosis Date Noted    Kidney replaced by transplant 02/02/2024     Priority: Medium    Aftercare following organ transplant 02/02/2024     Priority: Medium    Chronic kidney disease, stage 2 (mild) 02/02/2024     Priority: Medium    Need for pneumocystis prophylaxis 02/02/2024     Priority: Medium    Immunosuppressed status (H24) 02/02/2024     Priority: Medium    Secondary renal hyperparathyroidism (H24) 02/02/2024     Priority: Medium    Vitamin D deficiency 02/02/2024     Priority: Medium    Hypomagnesemia 02/02/2024     Priority: Medium    Obesity 02/02/2024     Priority: Medium    Non-proliferative diabetic retinopathy, both eyes (H) 09/16/2015     Priority: Medium    Pseudophakia, right eye 09/16/2015     Priority: Medium    Cataract, left 09/16/2015     Priority: Medium    TYPE 2 DIABETES, HBA1C GOAL < 7% 10/31/2010     Priority: Medium    CARDIOVASCULAR SCREENING; LDL GOAL LESS THAN 100 10/31/2010     Priority: Medium    Diabetes mellitus, type 2 (H) 04/22/2004     Priority: Medium     Problem list name updated by automated process. 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 Provider to review and confirm  Problem list name updated by automated process. Provider to review      Anal fissure 04/22/2004     Priority: Medium    HTN, kidney transplant related 04/22/2004     Priority: Medium     Problem list name updated by automated process. Provider to review         MEDICATIONS:   Prescription Medications as of 2/13/2024         Rx Number Disp Refills Start End Last Dispensed Date Next Fill Date Owning Pharmacy    amLODIPine (NORVASC) 10 MG tablet  -- -- 6/6/2023 --       Sig: Take 1 tablet by mouth daily    Class: Historical    Route: Oral    ASPIRIN 81 MG OR TABS  100 3 3/3/2008 --   [facility] #[id number] - [place], [place] - [address] AT [facility] [address]    Sig: ONE DAILY    Class: Fax    Route: Oral    atorvastatin (LIPITOR) 40 MG tablet  -- -- 6/6/2023 --       Sig: Take 1 tablet by mouth daily    Class: Historical    Route: Oral    blood glucose monitoring (NO BRAND SPECIFIED) meter device kit  1 kit 0 6/20/2023 --   [facility] #[id number] - [place], [place] - [address]    Sig: Use to test blood sugar 3 times daily or as directed.    Class: E-Prescribe    Calcium Carb-Cholecalciferol (CALCIUM 500 + D) 500-3.125 MG-MCG TABS  -- -- 8/1/2023 --       Sig: Take 1 tablet by mouth daily    Class: Historical    Route: Oral    insulin aspart (NOVOLOG FLEXPEN) 100 UNIT/ML pen  -- --  --       Sig: Inject 13 Units Subcutaneous 3 times daily (with meals)    Class: Historical    Route: Subcutaneous    insulin glargine (LANTUS PEN) 100 UNIT/ML pen  -- --  --       Sig: Inject 30 Units Subcutaneous at bedtime    Class: Historical    Route: Subcutaneous    magnesium oxide (MAG-OX) 400 MG tablet  90 tablet 3 9/26/2023 --   [id number] - [place], [place] - [address]    Sig: Take 1 tablet (400 mg) by mouth daily Take at 12 PM    Class: E-Prescribe    Route: Oral    metoprolol succinate ER (TOPROL XL) 50 MG 24 hr tablet  --  --  --       Sig: Take 50 mg by mouth daily    Class: Historical    Route: Oral    mycophenolic acid (GENERIC EQUIVALENT) 180 MG EC tablet  60 tablet 11 12/28/2023 --   [facility] - [address]    Sig: Take 1 tablet (180 mg) by mouth 2 times daily Total dose = 540 mg twice per day    Class: E-Prescribe    Notes to Pharmacy: TXP DT 2/1/2023 (Kidney) TXP Dischg DT  DX Kidney replaced by transplant Z94.0 [facility]    Route: Oral    mycophenolic acid (GENERIC EQUIVALENT) 360 MG EC tablet  60 tablet 11 12/28/2023 --   [address]    Sig: Take 1 tablet (360 mg) by mouth 2 times daily Total dose = 540 mg twice per day    Class: E-Prescribe    Notes to Pharmacy: TXP DT 2/1/2023 (Kidney) TXP Dischg DT  DX Kidney replaced by transplant Z94.0 [facility]    Route: Oral    predniSONE (DELTASONE) 5 MG tablet  90 tablet 3 7/3/2023 --   [facility]/pharmacy #[id number] [place], [place] - [address]    Sig: Take 1 tablet (5 mg) by mouth daily    Class: E-Prescribe    Route: Oral    sulfamethoxazole-trimethoprim (BACTRIM) 400-80 MG tablet  90 tablet 3 7/6/2023 --   [facility]/pharm[facility] #[id number] [place], [place] - [address]    Sig: Take 1 tablet by mouth daily    Class: E-Prescribe    Route: Oral    tacrolimus (GENERIC) 0.5 MG capsule  180 capsule 3 2/5/2024 --   [id number] [place], [place] - [address]    Sig: Take 1 capsule (0.5 mg) by mouth 2 times daily Total dose = 1.5 mg every 12 hours    Class: E-Prescribe    Notes to Pharmacy: TXP DT 2/1/2023 (Kidney) TXP Dischg DT  DX Kidney replaced by transplant Z94.0 [facility]    Route: Oral    tacrolimus (GENERIC) 1 MG capsule  180 capsule 3 2/5/2024 --   [id number] [place], [place] - [address]    Sig: Take 1 capsule (1 mg) by mouth 2 times daily Total dose = 1.5 mg every 12 hours    Class: E-Prescribe    Notes to Pharmacy: RICHI PHAN  2/1/2023 (Kidney) TXP Dischg DT  DX Kidney replaced by transplant Z94.0 [facility]    Route: Oral    torsemide (DEMADEX) 10 MG tablet  90 tablet 1 2/1/2024 --   [place], [place] - [address] [facility] [address]    Sig: Take 1 tablet (10 mg) by mouth daily    Class: E-Prescribe    Route: Oral            ALLERGIES:   Seasonal allergies    ROS:  Negative unless otherwise stated in HPI.      Physical Examination:   VITALS:   There were no vitals taken for this visit.    CONSTITUTIONAL: healthy, alert and no distress.   PSYCHIATRIC: mentation appears normal and affect normal.   NEURO: Normal movements and speech.   EYES: No jaundice or pallor.   SKIN: RLE: 3x2cm white wound. No purulence. +1 Edema. +1 DP and TP. LLE: +1 edema and 1+ DP and TP.   RESP: non-labored breathing    Labs:    BMP RESULTS:  Lab Results   Component Value Date     02/01/2024     05/07/2012    POTASSIUM 4.3 02/01/2024    POTASSIUM 4.4 08/06/2021    POTASSIUM 5.2 05/07/2012    CHLORIDE 105 02/01/2024    CHLORIDE 104 08/06/2021    CHLORIDE 105 05/07/2012    CO2 25 02/01/2024    CO2 17 (L) 08/06/2021    CO2 23 05/07/2012    ANIONGAP 9 02/01/2024    ANIONGAP 11 08/06/2021    ANIONGAP 11 05/07/2012     (H) 02/01/2024     (H) 06/14/2023     (H) 08/06/2021     (H) 05/07/2012    BUN 19.7 02/01/2024    BUN 77 (H) 08/06/2021    BUN 49 (H) 05/07/2012    CR 1.26 (H) 02/01/2024    CR 3.74 (H) 05/07/2012    GFRESTIMATED 59 (L) 02/01/2024    GFRESTIMATED 16 (L) 05/07/2012    GFRESTBLACK 20 (L) 05/07/2012    BOB 9.3 02/01/2024    BOB 9.2 05/07/2012        CBC RESULTS:  Lab Results   Component Value Date    WBC 8.7 02/01/2024    WBC 7.5 05/07/2012    RBC 4.50 02/01/2024    RBC 4.47 05/07/2012    HGB 12.8 (L) 02/01/2024    HGB 12.5 (L) 05/07/2012    HCT 39.5 (L) 02/01/2024    HCT 37.1 (L) 05/07/2012    MCV 88 02/01/2024    MCV 83 05/07/2012    MCH 28.4 02/01/2024    MCH 28.0 05/07/2012    MCHC 32.4  "02/01/2024    [facility]    RDW 13.9 02/01/2024    RDW 14.7 05/07/2012     02/01/2024     05/07/2012       INR/PTT:  No results found for: \"INR\", \"PTT\"    Diagnostic studies:     ULTRASOUND LOWER EXTREMITY ARTERIAL DUPLEX RIGHT 2/12/2024   IMPRESSION:   1. Distal right superficial femoral artery velocity exceeds 300 cm/s,  but velocity ratio is less than 3 and 62% diameter stenosis suggested  in color Doppler image. 50-75% diameter stenosis.   2. Arrythmia.    RESTING US ESTEBAN Doppler No Exercise 2/12/2024   IMPRESSION:  1. RIGHT:       A. Resting ESTEBAN is non compressible.       B. Resting TBI is ABNORMAL, 0.29.     2. LEFT:       A. Resting ESTEBAN is non compressible.       B. Resting TBI is ABNORMAL, 0.19.    LOWER EXTREMITY TRANSCUTANEOUS OXIMETRY (TCO2) 2/12/2024   IMPRESSION: Adequate wound healing capability in bilateral feet.       Assessment Jass Fierro is a 73 year old male with a PMH of ESKD, T2DM, post-kidney transplant, HTN here for on-going, non-healing right lower leg wound     #RLE Wound  Acute on chronic 3x2cm RLE wound. No evidence of purulence. Wound has not worsened. Surrounding edema may be preventing full healing of wound. PAD of RLE likely not contributing to wound as TCO2 is normal.    Plan:   -Refer to wound clinic   -Compression stockings   -See back in 3 months and re-consider intervention on that side if wound does not improve    #LLE radiculopathy vs claudication  Chronic numbness and tingling from hip/glute region to calf. Description of pain appears to be more radicular, however straight leg test normal and pt does describe some symptoms that align with claudication. Pt could have some degree of PAD in left extremity.     Plan   -US arterial duplex of left leg    I saw and examined the patient with the medical student and resident and agree to the assessment and plan.    Normal TCO2, may have small contribution of arterial insufficiency in delayed wound healing " "but suspect the wound will heal with proper compression and local wound care.     Left leg symptoms sound atypical for claudication given the \"tingling and numbness\" nature. Will defer until the right leg wound is treated properly. If the wound persists despite aggressive wound care and compression can proceed to angiography and intervention.      Again, thank you for allowing me to participate in the care of your patient.      Sincerely,    Joao Urbina MD    "

## 2024-02-15 ENCOUNTER — TELEPHONE (OUTPATIENT)
Dept: TRANSPLANT | Facility: CLINIC | Age: 74
End: 2024-02-15

## 2024-02-15 ENCOUNTER — LAB (OUTPATIENT)
Dept: LAB | Facility: CLINIC | Age: 74
End: 2024-02-15
Payer: COMMERCIAL

## 2024-02-15 DIAGNOSIS — Z79.899 OTHER LONG TERM (CURRENT) DRUG THERAPY: ICD-10-CM

## 2024-02-15 DIAGNOSIS — Z79.899 ENCOUNTER FOR LONG-TERM CURRENT USE OF MEDICATION: ICD-10-CM

## 2024-02-15 DIAGNOSIS — Z94.0 KIDNEY REPLACED BY TRANSPLANT: ICD-10-CM

## 2024-02-15 DIAGNOSIS — Z48.298 AFTERCARE FOLLOWING ORGAN TRANSPLANT: ICD-10-CM

## 2024-02-15 DIAGNOSIS — Z98.890 OTHER SPECIFIED POSTPROCEDURAL STATES: ICD-10-CM

## 2024-02-15 LAB
CD3 CELLS # BLD: 1523 CELLS/UL (ref 603–2990)
CD3 CELLS NFR BLD: 79 % (ref 49–84)
CD3+CD4+ CELLS # BLD: 362 CELLS/UL (ref 441–2156)
CD3+CD4+ CELLS NFR BLD: 19 % (ref 28–63)
CD3+CD4+ CELLS/CD3+CD8+ CLL BLD: 0.32 % (ref 1.4–2.6)
CD3+CD8+ CELLS # BLD: 1125 CELLS/UL (ref 125–1312)
CD3+CD8+ CELLS NFR BLD: 59 % (ref 10–40)
MAGNESIUM SERPL-MCNC: 1.8 MG/DL (ref 1.7–2.3)
PTH-INTACT SERPL-MCNC: 112 PG/ML (ref 15–65)
T CELL COMMENT: ABNORMAL
TACROLIMUS BLD-MCNC: 5.9 UG/L (ref 5–15)
TME LAST DOSE: NORMAL H
TME LAST DOSE: NORMAL H
VIT D+METAB SERPL-MCNC: 27 NG/ML (ref 20–50)

## 2024-02-15 PROCEDURE — 36415 COLL VENOUS BLD VENIPUNCTURE: CPT | Performed by: PATHOLOGY

## 2024-02-15 PROCEDURE — 86359 T CELLS TOTAL COUNT: CPT | Performed by: INTERNAL MEDICINE

## 2024-02-15 PROCEDURE — 99000 SPECIMEN HANDLING OFFICE-LAB: CPT | Performed by: PATHOLOGY

## 2024-02-15 PROCEDURE — 82306 VITAMIN D 25 HYDROXY: CPT | Performed by: INTERNAL MEDICINE

## 2024-02-15 PROCEDURE — 80197 ASSAY OF TACROLIMUS: CPT | Performed by: INTERNAL MEDICINE

## 2024-02-15 PROCEDURE — 83735 ASSAY OF MAGNESIUM: CPT | Performed by: PATHOLOGY

## 2024-02-15 PROCEDURE — 83970 ASSAY OF PARATHORMONE: CPT | Performed by: PATHOLOGY

## 2024-02-15 NOTE — TELEPHONE ENCOUNTER
----- Message from Stacie Sanchez RN sent at 2/15/2024 10:23 AM CST -----  Have patient stop bactrim. Thank you  ----- Message -----  From: Jose Chaney MD  Sent: 2/15/2024  10:17 AM CST  To: Stacie Sanchez RN    Ok to stop bactrim

## 2024-02-15 NOTE — TELEPHONE ENCOUNTER
Call placed to patient via  services. Spoke with patient son he v\u to stop patient Bactrim. He state that patient was instructed by pharmacist to take MPA at 360 mg in the morning and 540 mg in the evening. Per Dr. Gee note 2/1/24 MPA dose = 540 mg bid. Patient son v\u to increase MPA back to 540 mg bid. He confirm patient is taking tacrolimus 1.5 mg bid. Med list updated

## 2024-02-23 ENCOUNTER — TELEPHONE (OUTPATIENT)
Dept: RADIOLOGY | Facility: CLINIC | Age: 74
End: 2024-02-23
Payer: COMMERCIAL

## 2024-02-23 NOTE — TELEPHONE ENCOUNTER
02/23 Spoke with patient to attempt to sched may follow-up and imaging, patient instructed me to just schedule the appointment and he will see it on my chart.     I went ahead and scheduled this visit for him

## 2024-02-23 NOTE — TELEPHONE ENCOUNTER
----- Message from Gabrielle Dudley RN sent at 2/13/2024 11:24 AM CST -----  Regarding: Return PAD  HI please call pt to schedule Left arterial duplex and then to see Dr Urbina in clinic    Due approx 5/12/24    Thanks,     LORIE Dudley, RN, BSN  Interventional Radiology Care Coordinator   Phone:  [phone]

## 2024-03-04 ENCOUNTER — APPOINTMENT (OUTPATIENT)
Dept: INTERPRETER SERVICES | Facility: CLINIC | Age: 74
End: 2024-03-04
Payer: COMMERCIAL

## 2024-03-04 ENCOUNTER — TELEPHONE (OUTPATIENT)
Dept: OPHTHALMOLOGY | Facility: CLINIC | Age: 74
End: 2024-03-04
Payer: COMMERCIAL

## 2024-03-14 ENCOUNTER — OFFICE VISIT (OUTPATIENT)
Dept: WOUND CARE | Facility: CLINIC | Age: 74
End: 2024-03-14
Attending: RADIOLOGY
Payer: COMMERCIAL

## 2024-03-14 DIAGNOSIS — I70.213 ATHEROSCLEROSIS OF NATIVE ARTERY OF BOTH LOWER EXTREMITIES WITH INTERMITTENT CLAUDICATION (H): ICD-10-CM

## 2024-03-14 PROCEDURE — 99202 OFFICE O/P NEW SF 15 MIN: CPT | Performed by: PODIATRIST

## 2024-03-14 ASSESSMENT — PAIN SCALES - GENERAL: PAINLEVEL: NO PAIN (0)

## 2024-03-14 NOTE — LETTER
3/14/2024       RE: Jass Fierro  [address]  [facility] [address]     Dear Colleague,    Thank you for referring your patient, Jass Fierro, to the [facility] WOUND CLINIC [place] at [facility]. Please see a copy of my visit note below.    Date of Service: 3/14/2024    Chief Complaint   Patient presents with    Consult          HPI: Jass is a 73 year old male who presents today for further evaluation of RLE ulceration. He was referred by Dr. Urbina in IR. He notes that he had an insect bite on the leg a couple of months ago, and the area turned into an ulcer. He has LLE tingling and numbness. He is s/p kidney transplant. He does smoke. Ciara Cuellar notes PAD, but does not think it is contributing to the non healing ulceration. He notes that the wound has healed over. He is awaiting compression socks.     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 internal sphincterotomy       Allergies: Seasonal allergies    SH:   Social History     Socioeconomic History    Marital status: Single     Spouse name: Not on file     Number of children: Not on file    Years of education: Not on file    Highest education level: Not on file   Occupational History    Not on file   Tobacco Use    Smoking status: Never    Smokeless tobacco: Not on file   Substance and Sexual Activity    Alcohol use: No    Drug use: No    Sexual activity: Never   Other Topics Concern    Not on file   Social History Narrative    Patient has family of his wife and nine children all in [place], planning to come here next month.  He has not seen them in 6 years.                     Social Determinants of Health     Financial Resource Strain: Not on file   Food Insecurity: Not on file   Transportation Needs: Not on file   Physical Activity: Not on file   Stress: Not on file   Social Connections: Not on file   Interpersonal Safety: Not on file   Housing Stability: Not on file       FH:   Family History   Problem Relation Age of Onset    Family History Negative Other     Diabetes Other     Hypertension Other     Diabetes Mother     Diabetes Father     Hypertension Father     Hypertension Brother     Cancer No family hx of     Glaucoma No family hx of        Objective:  Data Unavailable Data Unavailable Data Unavailable Data Unavailable Data Unavailable 0 lbs 0 oz    PT and DP pulses are 1/4 bilaterally. CRT is 1 second. Absent pedal hair.   Gross sensation is intact bilaterally.   Equinus is noted bilaterally. No pain with active or passive ROM of the ankle, MTJ, 1st ray, or halluces bilaterally,.   No open lesions are noted.     ESTEBAN IMPRESSION:  1. RIGHT:       A. Resting ESTEBAN is non compressible.       B. Resting TBI is ABNORMAL, 0.29.     2. LEFT:       A. Resting ESTEBAN is non compressible.       B. Resting TBI is ABNORMAL, 0.19.     Guidelines:     ESTEBAN Diagnostic Criteria (Based on criteria published in Circulation  2011; 124: 1256-4210):    > 1.4: Non compressible    1.00 - 1.40: Normal    0.91 - 0.99: Borderline    At or below 0.90: Abnormal     I have personally reviewed  the examination and initial interpretation  and I agree with the findings.     RONNY FRANCO MD     TCO2 IMPRESSION: Adequate wound healing capability in bilateral feet.      Diagnostic criteria for TcpO2s measurements:  > 40 mmHg - adequate wound healing capability  20-40 mmHg - marginal impairment of wound healing capability  < 20 mmHg - marked impairment of wound healing capability     I have personally reviewed the examination and initial interpretation  and I agree with the findings.     RONNY FRANCO MD     Assessment:   Encounter Diagnoses   Name Primary?    Atherosclerosis of native artery of both lower extremities with intermittent claudication (H24)          Plan:  - Pt seen and evaluated.  - Start compression socks when he gets these.   - Activity as tolerated.  - See again PRN.            Again, thank you for allowing me to participate in the care of your patient.      Sincerely,    Gurmeet Paiz DPM

## 2024-03-14 NOTE — NURSING NOTE
Chief Complaint   Patient presents with    Consult       There were no vitals filed for this visit.    There is no height or weight on file to calculate BMI.    Stephon Rivera EMT-P    Right lateral ankle

## 2024-03-14 NOTE — PROGRESS NOTES
Date of Service: 3/14/2024    Chief Complaint   Patient presents with    Consult          HPI: Jass is a 73 year old male who presents today for further evaluation of RLE ulceration. He was referred by Dr. Urbina in . He notes that he had an insect bite on the leg a couple of months ago, and the area turned into an ulcer. He has LLE tingling and numbness. He is s/p kidney transplant. He does smoke. Ciara Cuellar notes PAD, but does not think it is contributing to the non healing ulceration. He notes that the wound has healed over. He is awaiting compression socks.     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     finished 9 months INH 7/03-4/04    Type II or unspecified type diabetes mellitus without mention of complication, not stated as uncontrolled     Unspecified essential hypertension        PSxH:   Past Surgical History:   Procedure Laterality Date    CATARACT IOL, RT/LT Right 2012    doctor and clinic unknown to pt    PHACOEMULSIFICATION WITH STANDARD INTRAOCULAR LENS IMPLANT Left 3/1/2019    Procedure: Left Eye Cataract Removal with Intraocular Lens Implant;  Surgeon: Kendal aVzquez MD;  Location:  OR    [facility] NONSPECIFIC PROCEDURE  03/2002    Fistulotomy + partial lat. internal sphincterotomy       Allergies: Seasonal allergies    SH:   Social History     Socioeconomic History    Marital status: Single     Spouse name: Not on file    Number of children: Not on file    Years of education: Not on file    Highest education level: Not on file   Occupational History    Not on file   Tobacco Use    Smoking status: Never    Smokeless tobacco: Not on file   Substance and Sexual Activity    Alcohol use: No    Drug use: No    Sexual activity: Never   Other Topics  Concern    Not on file   Social History Narrative    Patient has family of his wife and nine children all in [place], planning to come here next month.  He has not seen them in 6 years.                     Social Determinants of Health     Financial Resource Strain: Not on file   Food Insecurity: Not on file   Transportation Needs: Not on file   Physical Activity: Not on file   Stress: Not on file   Social Connections: Not on file   Interpersonal Safety: Not on file   Housing Stability: Not on file       FH:   Family History   Problem Relation Age of Onset    Family History Negative Other     Diabetes Other     Hypertension Other     Diabetes Mother     Diabetes Father     Hypertension Father     Hypertension Brother     Cancer No family hx of     Glaucoma No family hx of        Objective:  Data Unavailable Data Unavailable Data Unavailable Data Unavailable Data Unavailable 0 lbs 0 oz    PT and DP pulses are 1/4 bilaterally. CRT is 1 second. Absent pedal hair.   Gross sensation is intact bilaterally.   Equinus is noted bilaterally. No pain with active or passive ROM of the ankle, MTJ, 1st ray, or halluces bilaterally,.   No open lesions are noted.     ESTEBAN IMPRESSION:  1. RIGHT:       A. Resting ESTEBAN is non compressible.       B. Resting TBI is ABNORMAL, 0.29.     2. LEFT:       A. Resting ESTEBAN is non compressible.       B. Resting TBI is ABNORMAL, 0.19.     Guidelines:     ESTEBAN Diagnostic Criteria (Based on criteria published in Circulation  2011; 124: 6301-8806):    > 1.4: Non compressible    1.00 - 1.40: Normal    0.91 - 0.99: Borderline    At or below 0.90: Abnormal     I have personally reviewed the examination and initial interpretation  and I agree with the findings.     RONNY FRANCO MD     TCO2 IMPRESSION: Adequate wound healing capability in bilateral feet.      Diagnostic criteria for TcpO2s measurements:  > 40 mmHg - adequate wound healing capability  20-40 mmHg - marginal impairment of wound healing  capability  < 20 mmHg - marked impairment of wound healing capability     I have personally reviewed the examination and initial interpretation  and I agree with the findings.     RONNY FRANCO MD     Assessment:   Encounter Diagnoses   Name Primary?    Atherosclerosis of native artery of both lower extremities with intermittent claudication (H24)          Plan:  - Pt seen and evaluated.  - Start compression socks when he gets these.   - Activity as tolerated.  - See again PRN.

## 2024-03-15 ENCOUNTER — LAB (OUTPATIENT)
Dept: LAB | Facility: CLINIC | Age: 74
End: 2024-03-15
Payer: COMMERCIAL

## 2024-03-15 DIAGNOSIS — Z94.0 KIDNEY REPLACED BY TRANSPLANT: ICD-10-CM

## 2024-03-15 DIAGNOSIS — Z79.899 ENCOUNTER FOR LONG-TERM CURRENT USE OF MEDICATION: ICD-10-CM

## 2024-03-15 DIAGNOSIS — Z98.890 OTHER SPECIFIED POSTPROCEDURAL STATES: ICD-10-CM

## 2024-03-15 DIAGNOSIS — Z48.298 AFTERCARE FOLLOWING ORGAN TRANSPLANT: ICD-10-CM

## 2024-03-15 LAB
ANION GAP SERPL CALCULATED.3IONS-SCNC: 10 MMOL/L (ref 7–15)
BK VIRUS SPECIMEN TYPE: NORMAL
BKV DNA # SPEC NAA+PROBE: NOT DETECTED IU/ML
BUN SERPL-MCNC: 20.4 MG/DL (ref 8–23)
CALCIUM SERPL-MCNC: 9.2 MG/DL (ref 8.8–10.2)
CHLORIDE SERPL-SCNC: 104 MMOL/L (ref 98–107)
CREAT SERPL-MCNC: 1.23 MG/DL (ref 0.67–1.17)
DEPRECATED HCO3 PLAS-SCNC: 25 MMOL/L (ref 22–29)
EGFRCR SERPLBLD CKD-EPI 2021: 61 ML/MIN/1.73M2
ERYTHROCYTE [DISTWIDTH] IN BLOOD BY AUTOMATED COUNT: 13.8 % (ref 10–15)
GLUCOSE SERPL-MCNC: 140 MG/DL (ref 70–99)
HCT VFR BLD AUTO: 41 % (ref 40–53)
HGB BLD-MCNC: 13 G/DL (ref 13.3–17.7)
MCH RBC QN AUTO: 28.1 PG (ref 26.5–33)
MCHC RBC AUTO-ENTMCNC: 31.7 G/DL (ref 31.5–36.5)
MCV RBC AUTO: 89 FL (ref 78–100)
PLATELET # BLD AUTO: 181 10E3/UL (ref 150–450)
POTASSIUM SERPL-SCNC: 4.1 MMOL/L (ref 3.4–5.3)
RBC # BLD AUTO: 4.63 10E6/UL (ref 4.4–5.9)
SODIUM SERPL-SCNC: 139 MMOL/L (ref 135–145)
TACROLIMUS BLD-MCNC: 6 UG/L (ref 5–15)
TME LAST DOSE: NORMAL H
TME LAST DOSE: NORMAL H
WBC # BLD AUTO: 7.3 10E3/UL (ref 4–11)

## 2024-03-15 PROCEDURE — 36415 COLL VENOUS BLD VENIPUNCTURE: CPT | Performed by: PATHOLOGY

## 2024-03-15 PROCEDURE — 85027 COMPLETE CBC AUTOMATED: CPT | Performed by: PATHOLOGY

## 2024-03-15 PROCEDURE — 80048 BASIC METABOLIC PNL TOTAL CA: CPT | Performed by: PATHOLOGY

## 2024-03-15 PROCEDURE — 87799 DETECT AGENT NOS DNA QUANT: CPT | Performed by: INTERNAL MEDICINE

## 2024-03-15 PROCEDURE — 99000 SPECIMEN HANDLING OFFICE-LAB: CPT | Performed by: PATHOLOGY

## 2024-03-15 PROCEDURE — 80197 ASSAY OF TACROLIMUS: CPT | Performed by: INTERNAL MEDICINE

## 2024-04-15 ENCOUNTER — LAB (OUTPATIENT)
Dept: LAB | Facility: CLINIC | Age: 74
End: 2024-04-15
Payer: COMMERCIAL

## 2024-04-15 DIAGNOSIS — Z79.899 ENCOUNTER FOR LONG-TERM CURRENT USE OF MEDICATION: ICD-10-CM

## 2024-04-15 DIAGNOSIS — Z48.298 AFTERCARE FOLLOWING ORGAN TRANSPLANT: ICD-10-CM

## 2024-04-15 DIAGNOSIS — Z94.0 KIDNEY REPLACED BY TRANSPLANT: ICD-10-CM

## 2024-04-15 DIAGNOSIS — Z98.890 OTHER SPECIFIED POSTPROCEDURAL STATES: ICD-10-CM

## 2024-04-15 LAB
ALBUMIN SERPL BCG-MCNC: 4.1 G/DL (ref 3.5–5.2)
ALP SERPL-CCNC: 79 U/L (ref 40–150)
ALT SERPL W P-5'-P-CCNC: 24 U/L (ref 0–70)
ANION GAP SERPL CALCULATED.3IONS-SCNC: 10 MMOL/L (ref 7–15)
AST SERPL W P-5'-P-CCNC: 25 U/L (ref 0–45)
BILIRUB DIRECT SERPL-MCNC: <0.2 MG/DL (ref 0–0.3)
BILIRUB SERPL-MCNC: 0.6 MG/DL
BK VIRUS SPECIMEN TYPE: NORMAL
BKV DNA # SPEC NAA+PROBE: NOT DETECTED IU/ML
BUN SERPL-MCNC: 19.4 MG/DL (ref 8–23)
CALCIUM SERPL-MCNC: 9.3 MG/DL (ref 8.8–10.2)
CHLORIDE SERPL-SCNC: 105 MMOL/L (ref 98–107)
CREAT SERPL-MCNC: 1.19 MG/DL (ref 0.67–1.17)
DEPRECATED HCO3 PLAS-SCNC: 24 MMOL/L (ref 22–29)
EGFRCR SERPLBLD CKD-EPI 2021: 64 ML/MIN/1.73M2
ERYTHROCYTE [DISTWIDTH] IN BLOOD BY AUTOMATED COUNT: 13.9 % (ref 10–15)
GLUCOSE SERPL-MCNC: 125 MG/DL (ref 70–99)
HCT VFR BLD AUTO: 40.4 % (ref 40–53)
HGB BLD-MCNC: 13 G/DL (ref 13.3–17.7)
MCH RBC QN AUTO: 28.3 PG (ref 26.5–33)
MCHC RBC AUTO-ENTMCNC: 32.2 G/DL (ref 31.5–36.5)
MCV RBC AUTO: 88 FL (ref 78–100)
PLATELET # BLD AUTO: 184 10E3/UL (ref 150–450)
POTASSIUM SERPL-SCNC: 4.2 MMOL/L (ref 3.4–5.3)
PROT SERPL-MCNC: 6.8 G/DL (ref 6.4–8.3)
RBC # BLD AUTO: 4.59 10E6/UL (ref 4.4–5.9)
SODIUM SERPL-SCNC: 139 MMOL/L (ref 135–145)
TACROLIMUS BLD-MCNC: 6.8 UG/L (ref 5–15)
TME LAST DOSE: NORMAL H
TME LAST DOSE: NORMAL H
WBC # BLD AUTO: 5.5 10E3/UL (ref 4–11)

## 2024-04-15 PROCEDURE — 80197 ASSAY OF TACROLIMUS: CPT | Performed by: INTERNAL MEDICINE

## 2024-04-15 PROCEDURE — 36415 COLL VENOUS BLD VENIPUNCTURE: CPT | Performed by: PATHOLOGY

## 2024-04-15 PROCEDURE — 99000 SPECIMEN HANDLING OFFICE-LAB: CPT | Performed by: PATHOLOGY

## 2024-04-15 PROCEDURE — 85027 COMPLETE CBC AUTOMATED: CPT | Performed by: PATHOLOGY

## 2024-04-15 PROCEDURE — 82248 BILIRUBIN DIRECT: CPT | Performed by: PATHOLOGY

## 2024-04-15 PROCEDURE — 87799 DETECT AGENT NOS DNA QUANT: CPT | Performed by: INTERNAL MEDICINE

## 2024-04-15 PROCEDURE — 80053 COMPREHEN METABOLIC PANEL: CPT | Performed by: PATHOLOGY

## 2024-04-16 ENCOUNTER — TELEPHONE (OUTPATIENT)
Dept: TRANSPLANT | Facility: CLINIC | Age: 74
End: 2024-04-16
Payer: COMMERCIAL

## 2024-04-16 NOTE — TELEPHONE ENCOUNTER
ISSUE:   Tacrolimus IR level 6,8 on 4/16, goal 4-6, dose 1.5 mg BID.    PLAN:   Call Patient and confirm this was an accurate 12-hour trough.   Verify Tacrolimus IR dose 1.5 mg BID.   Confirm no new medications or or missed doses.   Confirm no new illness / infection / diarrhea.   If accurate trough and accurate dose, decrease Tacrolimus IR dose to 1 mg BID     Is this more than a 50% increase or decrease in current IS dose: No  If YES, justification: na    Repeat labs in 1 months.  *If > 50% change in immunosuppression dose, repeat labs in 1 week.     OUTCOME:

## 2024-04-17 NOTE — TELEPHONE ENCOUNTER
Call placed to patient via  services. No answer. Detailed voice message left with instructions listed below. Will try back

## 2024-04-19 NOTE — TELEPHONE ENCOUNTER
PeerApp message sent to patient regarding:  Tacrolimus IR level 6,8 on 4/16, goal 4-6, dose 1.5 mg BID.     PLAN:   Call Patient and confirm this was an accurate 12-hour trough.   Verify Tacrolimus IR dose 1.5 mg BID.   Confirm no new medications or or missed doses.   Confirm no new illness / infection / diarrhea.   If accurate trough and accurate dose, decrease Tacrolimus IR dose to 1 mg BID      Is this more than a 50% increase or decrease in current IS dose: No  If YES, justification: na     Repeat labs in 1 months.

## 2024-04-22 NOTE — TELEPHONE ENCOUNTER
Call placed to patient via  services. No answer. Detailed voice message left with instructions listed below.

## 2024-05-06 DIAGNOSIS — I70.213 ATHEROSCLEROSIS OF NATIVE ARTERY OF BOTH LOWER EXTREMITIES WITH INTERMITTENT CLAUDICATION (H): Primary | ICD-10-CM

## 2024-05-14 ENCOUNTER — OFFICE VISIT (OUTPATIENT)
Dept: VASCULAR SURGERY | Facility: CLINIC | Age: 74
End: 2024-05-14
Payer: COMMERCIAL

## 2024-05-14 ENCOUNTER — ANCILLARY PROCEDURE (OUTPATIENT)
Dept: ULTRASOUND IMAGING | Facility: CLINIC | Age: 74
End: 2024-05-14
Attending: RADIOLOGY
Payer: COMMERCIAL

## 2024-05-14 VITALS — DIASTOLIC BLOOD PRESSURE: 70 MMHG | OXYGEN SATURATION: 98 % | SYSTOLIC BLOOD PRESSURE: 135 MMHG | HEART RATE: 63 BPM

## 2024-05-14 DIAGNOSIS — I70.213 ATHEROSCLEROSIS OF NATIVE ARTERY OF BOTH LOWER EXTREMITIES WITH INTERMITTENT CLAUDICATION (H): ICD-10-CM

## 2024-05-14 DIAGNOSIS — I70.213 ATHEROSCLEROSIS OF NATIVE ARTERY OF BOTH LOWER EXTREMITIES WITH INTERMITTENT CLAUDICATION (H): Primary | ICD-10-CM

## 2024-05-14 PROCEDURE — 99213 OFFICE O/P EST LOW 20 MIN: CPT | Performed by: RADIOLOGY

## 2024-05-14 PROCEDURE — 93926 LOWER EXTREMITY STUDY: CPT | Mod: RT | Performed by: STUDENT IN AN ORGANIZED HEALTH CARE EDUCATION/TRAINING PROGRAM

## 2024-05-14 RX ORDER — LANCETS 30 GAUGE
EACH MISCELLANEOUS
COMMUNITY
Start: 2024-05-07

## 2024-05-14 RX ORDER — ACETAMINOPHEN 500 MG
500-1000 TABLET ORAL EVERY 8 HOURS PRN
COMMUNITY
Start: 2024-03-21

## 2024-05-14 RX ORDER — TAMSULOSIN HYDROCHLORIDE 0.4 MG/1
0.4 CAPSULE ORAL
COMMUNITY
Start: 2024-05-07

## 2024-05-14 NOTE — LETTER
2024       RE: Jass Fierro  [address]  [facility] [address]     Dear Colleague,    Thank you for referring your patient, Jass Fierro, to the [facility] VASCULAR [facility]IN[facility] [place] [facility]. Please see a copy of my visit note below.    Mr. Fierro is a very pleasant 73-year-old gentleman with peripheral arterial disease, last evaluated on 2024 at which time he had a predominantly healed right foot wound.  Our plan was to follow-up after 3 months and make sure the wound fully healed.  At the time, we prescribed an compression stockings to help with bilateral pitting edema, +1.    Today, he reports that his wound is fully healed and he has no issues.  He does not endorse any pain or symptoms.  He continues to use a cane to assist his walking.  He reports that he wears his compression stockings for 1 day and then leave them off for the following days and has recurrent swelling.  I told him that he needs to wear his stockings daily to help manage the swelling.    Physical exam:    /70 (BP Location: Left arm, Patient Position: Sitting, Cuff Size: Adult Large)   Pulse 63   SpO2 98%   Normocephalic atraumatic.  Normal speech and mentation.    Extremities:    No ulcerations.  No lipodermatosclerosis.  No varicose veins.  He has +1/+2 pretibial pitting edema to the upper calf.    Imaging:    I reviewed the right lower extremity duplex Doppler ultrasound which demonstrates:    Right le-75% narrowing distal SFA as per velocity criteria.   Findings similar to the prior.     Assessment/plan:    73-year-old with peripheral arterial disease and a now fully healed right foot wound.  No lifestyle limiting claudication or rest pain.  Follow-up as needed.      Again, thank you for allowing me to participate in the care of your patient.      Sincerely,    Joao Urbina MD

## 2024-05-14 NOTE — PROGRESS NOTES
Mr. Fierro is a very pleasant 73-year-old gentleman with peripheral arterial disease, last evaluated on 2024 at which time he had a predominantly healed right foot wound.  Our plan was to follow-up after 3 months and make sure the wound fully healed.  At the time, we prescribed an compression stockings to help with bilateral pitting edema, +1.    Today, he reports that his wound is fully healed and he has no issues.  He does not endorse any pain or symptoms.  He continues to use a cane to assist his walking.  He reports that he wears his compression stockings for 1 day and then leave them off for the following days and has recurrent swelling.  I told him that he needs to wear his stockings daily to help manage the swelling.    Physical exam:    /70 (BP Location: Left arm, Patient Position: Sitting, Cuff Size: Adult Large)   Pulse 63   SpO2 98%   Normocephalic atraumatic.  Normal speech and mentation.    Extremities:    No ulcerations.  No lipodermatosclerosis.  No varicose veins.  He has +1/+2 pretibial pitting edema to the upper calf.    Imaging:    I reviewed the right lower extremity duplex Doppler ultrasound which demonstrates:    Right le-75% narrowing distal SFA as per velocity criteria.   Findings similar to the prior.     Assessment/plan:    73-year-old with peripheral arterial disease and a now fully healed right foot wound.  No lifestyle limiting claudication or rest pain.  Follow-up as needed.

## 2024-05-14 NOTE — PATIENT INSTRUCTIONS
Sri you have had your follow up today with Dr Urbina regarding PAD.     Plan:    Any further follow with IR will be PRN.      Thanks,     A. Coby Dudley RN, BSN  Interventional Radiology Care Coordinator   Phone:  [phone]

## 2024-06-17 ENCOUNTER — TELEPHONE (OUTPATIENT)
Dept: TRANSPLANT | Facility: CLINIC | Age: 74
End: 2024-06-17
Payer: COMMERCIAL

## 2024-06-17 DIAGNOSIS — Z48.298 AFTERCARE FOLLOWING ORGAN TRANSPLANT: Primary | ICD-10-CM

## 2024-06-17 NOTE — TELEPHONE ENCOUNTER
[facility] Call Center    Phone Message    May a detailed message be left on voicemail: no     Reason for Call: Other: patients primary care clinic calling stating patient is needing to schedule to be seen by nephrology. There is no current orders but last OV note from February does state to follow up in Aug. We have no current orders and patient wasn't on line with clinic to schedule. Please place orders so we can reach out to patient to schedule, please not we are scheduling into middle of November for RKTs.     Action Taken: Message routed to:  Other: [facility]    Travel Screening: Not Applicable

## 2024-06-20 ENCOUNTER — DOCUMENTATION ONLY (OUTPATIENT)
Dept: TRANSPLANT | Facility: CLINIC | Age: 74
End: 2024-06-20
Payer: COMMERCIAL

## 2024-06-24 ENCOUNTER — LAB (OUTPATIENT)
Dept: LAB | Facility: CLINIC | Age: 74
End: 2024-06-24
Payer: COMMERCIAL

## 2024-06-24 DIAGNOSIS — Z94.0 KIDNEY REPLACED BY TRANSPLANT: ICD-10-CM

## 2024-06-24 DIAGNOSIS — Z48.298 AFTERCARE FOLLOWING ORGAN TRANSPLANT: ICD-10-CM

## 2024-06-24 DIAGNOSIS — Z98.890 OTHER SPECIFIED POSTPROCEDURAL STATES: ICD-10-CM

## 2024-06-24 DIAGNOSIS — Z79.899 ENCOUNTER FOR LONG-TERM CURRENT USE OF MEDICATION: ICD-10-CM

## 2024-06-24 LAB
ANION GAP SERPL CALCULATED.3IONS-SCNC: 9 MMOL/L (ref 7–15)
BK VIRUS SPECIMEN TYPE: NORMAL
BKV DNA # SPEC NAA+PROBE: NOT DETECTED IU/ML
BUN SERPL-MCNC: 18.8 MG/DL (ref 8–23)
CALCIUM SERPL-MCNC: 9.2 MG/DL (ref 8.8–10.2)
CHLORIDE SERPL-SCNC: 105 MMOL/L (ref 98–107)
CREAT SERPL-MCNC: 1.2 MG/DL (ref 0.67–1.17)
DEPRECATED HCO3 PLAS-SCNC: 24 MMOL/L (ref 22–29)
EGFRCR SERPLBLD CKD-EPI 2021: 63 ML/MIN/1.73M2
ERYTHROCYTE [DISTWIDTH] IN BLOOD BY AUTOMATED COUNT: 14.1 % (ref 10–15)
GLUCOSE SERPL-MCNC: 202 MG/DL (ref 70–99)
HCT VFR BLD AUTO: 42.6 % (ref 40–53)
HGB BLD-MCNC: 13.5 G/DL (ref 13.3–17.7)
MCH RBC QN AUTO: 27.8 PG (ref 26.5–33)
MCHC RBC AUTO-ENTMCNC: 31.7 G/DL (ref 31.5–36.5)
MCV RBC AUTO: 88 FL (ref 78–100)
PLATELET # BLD AUTO: 194 10E3/UL (ref 150–450)
POTASSIUM SERPL-SCNC: 4.4 MMOL/L (ref 3.4–5.3)
RBC # BLD AUTO: 4.85 10E6/UL (ref 4.4–5.9)
SODIUM SERPL-SCNC: 138 MMOL/L (ref 135–145)
TACROLIMUS BLD-MCNC: 6.8 UG/L (ref 5–15)
TME LAST DOSE: NORMAL H
TME LAST DOSE: NORMAL H
WBC # BLD AUTO: 9.1 10E3/UL (ref 4–11)

## 2024-06-24 PROCEDURE — 87799 DETECT AGENT NOS DNA QUANT: CPT | Performed by: INTERNAL MEDICINE

## 2024-06-24 PROCEDURE — 80197 ASSAY OF TACROLIMUS: CPT | Performed by: INTERNAL MEDICINE

## 2024-06-24 PROCEDURE — 36415 COLL VENOUS BLD VENIPUNCTURE: CPT | Performed by: PATHOLOGY

## 2024-06-24 PROCEDURE — 80048 BASIC METABOLIC PNL TOTAL CA: CPT | Performed by: PATHOLOGY

## 2024-06-24 PROCEDURE — 99000 SPECIMEN HANDLING OFFICE-LAB: CPT | Performed by: PATHOLOGY

## 2024-06-24 PROCEDURE — 85027 COMPLETE CBC AUTOMATED: CPT | Performed by: PATHOLOGY

## 2024-07-26 ENCOUNTER — TELEPHONE (OUTPATIENT)
Dept: TRANSPLANT | Facility: CLINIC | Age: 74
End: 2024-07-26
Payer: COMMERCIAL

## 2024-07-26 NOTE — TELEPHONE ENCOUNTER
MSW received a phone call from social security worker Leah stating that Jass was being audited by medicare. Leah requested copy of 2728 form with direction to fax copy to [phone] (Attention Leah). Per chart review, Jass received his tx 02/01/2023 [facility] foreign [facility] and does not have a 2728 form on file. MSW called Jass via interpretation services (Citizen [place]  Makeda) to relay information. Makeda left vm for Jass explaining that he has been audited and social security's request to receive 2728 form. Makeda provided fax information for Leha.

## 2024-08-12 ENCOUNTER — TELEPHONE (OUTPATIENT)
Dept: VASCULAR SURGERY | Facility: CLINIC | Age: 74
End: 2024-08-12
Payer: COMMERCIAL

## 2024-08-12 DIAGNOSIS — I70.213 ATHEROSCLEROSIS OF NATIVE ARTERY OF BOTH LOWER EXTREMITIES WITH INTERMITTENT CLAUDICATION (H): Primary | ICD-10-CM

## 2024-08-12 NOTE — TELEPHONE ENCOUNTER
[facility] Call Center    Phone Message    May a detailed message be left on voicemail: yes     Reason for Call: Other: Pt's brother Sri states he will be with his brother. Please call back at [phone] to schedule a blood flow check. Pt's brother does not have consent to communicate but states they will be together in about half an hour. Please call back around noon today if possible. Thank you.      Action Taken: Message routed to:  [facility] vascular    Travel Screening: Not Applicable     Date of Service:

## 2024-08-15 NOTE — TELEPHONE ENCOUNTER
Left Voicemail (1st Attempt) and Sent DooBophart (1st Attempt) for the patient to call back and schedule the following: Follow PAD.       Location: [facility]  Provider:    Appointment type: Return Vascular Patient    Appointment mode: in person     Return date: ASAP      Specialty phone number: [phone] or  [phone]      Is Imaging Needed: ESTEBAN no ex and TCO2    Imaging Phone Number:[phone]    Additional Notes: Please schedule imaging prior to Provider visit.    Nakul Amador on 8/15/2024 at 5:11 PM

## 2024-08-16 ENCOUNTER — TELEPHONE (OUTPATIENT)
Dept: TRANSPLANT | Facility: CLINIC | Age: 74
End: 2024-08-16
Payer: COMMERCIAL

## 2024-08-16 NOTE — TELEPHONE ENCOUNTER
Left Voicemail (2nd Attempt) and Sent Mychart for the patient to call back and schedule the following: Follow PAD.       Location: [facility]  Provider:    Appointment type: Return Vascular Patient    Appointment mode: in person     Return date: ASAP      Specialty phone number: [phone] or  [phone]      Is Imaging Needed: ESTEBAN no ex and TCO2    Imaging Phone Number:[phone]    Additional Notes: Please schedule imaging prior to Provider visit.  Nakul Amador on 8/16/2024 at 2:01 PM

## 2024-08-16 NOTE — TELEPHONE ENCOUNTER
10:25 AM: MSW received voicemail from [facility] Coordinator Leah requesting pt's information regarding 2728 form.  10:30 AM: Per chart review, pt was transplanted in [place] on 02/01/2023. MSW called Leah back and left voice mail in protected voice mail box explaining pt received tx at a Non-[facility] agency and thus sw team does not have a copy of pt's 2728 form. MSW redirected Leah to communicate with the pt.

## 2024-08-19 NOTE — TELEPHONE ENCOUNTER
The pt is scheduled on 8/20 at the [facility] [place] and on 9/17 at the [facility] with Ciara.  The pts and his brother state that the ptsees the podiatric on 8/22/24 and he needs the imaging to be done prior to this visit.  Please advise if the pt is needing to be added in on a sooner date with Ciara?  Nakul Amador on 8/19/2024 at 3:46 PM

## 2024-08-20 ENCOUNTER — ANCILLARY PROCEDURE (OUTPATIENT)
Dept: ULTRASOUND IMAGING | Facility: CLINIC | Age: 74
End: 2024-08-20
Attending: RADIOLOGY
Payer: COMMERCIAL

## 2024-08-20 DIAGNOSIS — I70.213 ATHEROSCLEROSIS OF NATIVE ARTERY OF BOTH LOWER EXTREMITIES WITH INTERMITTENT CLAUDICATION (H): ICD-10-CM

## 2024-08-20 PROCEDURE — 93922 UPR/L XTREMITY ART 2 LEVELS: CPT | Mod: 52 | Performed by: RADIOLOGY

## 2024-08-20 PROCEDURE — 93922 UPR/L XTREMITY ART 2 LEVELS: CPT | Performed by: RADIOLOGY

## 2024-08-20 NOTE — TELEPHONE ENCOUNTER
I left a voice mail for the pt to call and scheduled with the [facility] for a sooner appt.  Nakul Amador on 8/20/2024 at 6:21 PM

## 2024-08-23 NOTE — TELEPHONE ENCOUNTER
The pts son states tht he will call back with the pt to scheduled.  Nakul Amador on 8/23/2024 at 1:02 PM

## 2024-08-25 ENCOUNTER — HEALTH MAINTENANCE LETTER (OUTPATIENT)
Age: 74
End: 2024-08-25

## 2024-09-16 ENCOUNTER — OFFICE VISIT (OUTPATIENT)
Dept: TRANSPLANT | Facility: CLINIC | Age: 74
End: 2024-09-16
Attending: INTERNAL MEDICINE
Payer: COMMERCIAL

## 2024-09-16 ENCOUNTER — LAB (OUTPATIENT)
Dept: LAB | Facility: CLINIC | Age: 74
End: 2024-09-16
Attending: INTERNAL MEDICINE
Payer: COMMERCIAL

## 2024-09-16 VITALS
DIASTOLIC BLOOD PRESSURE: 80 MMHG | WEIGHT: 229 LBS | RESPIRATION RATE: 18 BRPM | SYSTOLIC BLOOD PRESSURE: 139 MMHG | HEART RATE: 71 BPM | OXYGEN SATURATION: 98 % | BODY MASS INDEX: 35.51 KG/M2 | TEMPERATURE: 98.6 F

## 2024-09-16 DIAGNOSIS — Z94.0 KIDNEY REPLACED BY TRANSPLANT: ICD-10-CM

## 2024-09-16 DIAGNOSIS — Z48.298 AFTERCARE FOLLOWING ORGAN TRANSPLANT: ICD-10-CM

## 2024-09-16 DIAGNOSIS — N18.9 HISTORY OF ANEMIA DUE TO CKD: ICD-10-CM

## 2024-09-16 DIAGNOSIS — N25.81 SECONDARY RENAL HYPERPARATHYROIDISM (H): ICD-10-CM

## 2024-09-16 DIAGNOSIS — Z94.0 KIDNEY REPLACED BY TRANSPLANT: Primary | ICD-10-CM

## 2024-09-16 DIAGNOSIS — E66.01 CLASS 2 SEVERE OBESITY DUE TO EXCESS CALORIES WITH SERIOUS COMORBIDITY AND BODY MASS INDEX (BMI) OF 35.0 TO 35.9 IN ADULT (H): ICD-10-CM

## 2024-09-16 DIAGNOSIS — Z86.2 HISTORY OF ANEMIA DUE TO CKD: ICD-10-CM

## 2024-09-16 DIAGNOSIS — Z98.890 OTHER SPECIFIED POSTPROCEDURAL STATES: ICD-10-CM

## 2024-09-16 DIAGNOSIS — E66.812 CLASS 2 SEVERE OBESITY DUE TO EXCESS CALORIES WITH SERIOUS COMORBIDITY AND BODY MASS INDEX (BMI) OF 35.0 TO 35.9 IN ADULT (H): ICD-10-CM

## 2024-09-16 DIAGNOSIS — I15.1 HTN, KIDNEY TRANSPLANT RELATED: ICD-10-CM

## 2024-09-16 DIAGNOSIS — N18.31 STAGE 3A CHRONIC KIDNEY DISEASE (H): ICD-10-CM

## 2024-09-16 DIAGNOSIS — Z79.899 ENCOUNTER FOR LONG-TERM CURRENT USE OF MEDICATION: ICD-10-CM

## 2024-09-16 DIAGNOSIS — D84.9 IMMUNOSUPPRESSION (H): ICD-10-CM

## 2024-09-16 DIAGNOSIS — Z29.89 NEED FOR PNEUMOCYSTIS PROPHYLAXIS: ICD-10-CM

## 2024-09-16 DIAGNOSIS — Z94.0 HTN, KIDNEY TRANSPLANT RELATED: ICD-10-CM

## 2024-09-16 LAB
ANION GAP SERPL CALCULATED.3IONS-SCNC: 11 MMOL/L (ref 7–15)
BUN SERPL-MCNC: 19.4 MG/DL (ref 8–23)
CALCIUM SERPL-MCNC: 9.4 MG/DL (ref 8.8–10.4)
CHLORIDE SERPL-SCNC: 102 MMOL/L (ref 98–107)
CREAT SERPL-MCNC: 1.18 MG/DL (ref 0.67–1.17)
EGFRCR SERPLBLD CKD-EPI 2021: 64 ML/MIN/1.73M2
ERYTHROCYTE [DISTWIDTH] IN BLOOD BY AUTOMATED COUNT: 14.3 % (ref 10–15)
GLUCOSE SERPL-MCNC: 135 MG/DL (ref 70–99)
HCO3 SERPL-SCNC: 23 MMOL/L (ref 22–29)
HCT VFR BLD AUTO: 40.7 % (ref 40–53)
HGB BLD-MCNC: 12.9 G/DL (ref 13.3–17.7)
MCH RBC QN AUTO: 27.6 PG (ref 26.5–33)
MCHC RBC AUTO-ENTMCNC: 31.7 G/DL (ref 31.5–36.5)
MCV RBC AUTO: 87 FL (ref 78–100)
PLATELET # BLD AUTO: 211 10E3/UL (ref 150–450)
POTASSIUM SERPL-SCNC: 4.6 MMOL/L (ref 3.4–5.3)
RBC # BLD AUTO: 4.67 10E6/UL (ref 4.4–5.9)
SODIUM SERPL-SCNC: 136 MMOL/L (ref 135–145)
WBC # BLD AUTO: 11.4 10E3/UL (ref 4–11)

## 2024-09-16 PROCEDURE — 36415 COLL VENOUS BLD VENIPUNCTURE: CPT | Performed by: PATHOLOGY

## 2024-09-16 PROCEDURE — 85027 COMPLETE CBC AUTOMATED: CPT | Performed by: PATHOLOGY

## 2024-09-16 PROCEDURE — 80048 BASIC METABOLIC PNL TOTAL CA: CPT | Performed by: PATHOLOGY

## 2024-09-16 PROCEDURE — 99000 SPECIMEN HANDLING OFFICE-LAB: CPT | Performed by: PATHOLOGY

## 2024-09-16 PROCEDURE — 87799 DETECT AGENT NOS DNA QUANT: CPT | Performed by: INTERNAL MEDICINE

## 2024-09-16 PROCEDURE — G0463 HOSPITAL OUTPT CLINIC VISIT: HCPCS | Performed by: INTERNAL MEDICINE

## 2024-09-16 PROCEDURE — 99214 OFFICE O/P EST MOD 30 MIN: CPT | Performed by: INTERNAL MEDICINE

## 2024-09-16 PROCEDURE — G2211 COMPLEX E/M VISIT ADD ON: HCPCS | Performed by: INTERNAL MEDICINE

## 2024-09-16 ASSESSMENT — PAIN SCALES - GENERAL: PAINLEVEL: NO PAIN (0)

## 2024-09-16 NOTE — PATIENT INSTRUCTIONS
submit a urine sample and cbc with diff next week    Follow up with podiatry and vascular     Transplant Patient Information  Your Post Transplant Coordinator is: Stacie Sanchez  You and your care team can contact your transplant coordinator Monday - Friday, 8am - 5pm at [phone] (Option 2 to reach the coordinator or Option 4 to schedule an appointment).  You can also reach your care team online via [employer].  After hours for urgent matters, please call [facility] at [phone].

## 2024-09-16 NOTE — LETTER
9/16/2024      Jass Fierro  [address]  [facility] [address]      Dear Colleague,    Thank you for referring your patient, Jass Fierro, to the [facility] TRANSPLANT [facility]IN[facility]. Please see a copy of my visit note below.    TRANSPLANT NEPHROLOGY CHRONIC POST TRANSPLANT VISIT    Recommendations:  - repeat cbc with diff   - UA Ucx  - f/up podiatry    Assessment & Plan  # LDKT: CKD stage 3aA1 Stable   - Baseline Creatinine:  ~ 1.1-1.3   - Proteinuria: Normal (<0.2 grams)   - Date DSA Last Checked: Not Known      Latest DSA: Transplanted at outside Tx Program (Transplanted in [facility])   - BK Viremia: No   - Kidney Tx Biopsy: No    # Immunosuppression: Tacrolimus immediate release (goal 4-6), Mycophenolic acid (dose 540 mg every 12 hours), and Prednisone (dose 5 mg daily)   - Continue with intensive monitoring of immunosuppression for efficacy and toxicity.   - On prednisone due to being transplanted at outside Transplant Program ([facility]) and came back to [place] it.   - Changes: Not at this time    # Infection Prophylaxis:   - PJP: Sulfa/TMP (Bactrim)    # Leukocytosis:   - repeat CBC with diff   - UA Ucx   - f/up podiatry    # Hypertension: Borderline control;  Goal BP: < 130/80   - Changes: Not at this time    # Diabetes: Borderline control (HbA1c 7-9%) Last HbA1c: 7.8%   - Management as per primary care.    # Mineral Bone Disorder:    - Secondary renal hyperparathyroidism; PTH level: Mildly elevated (151-300 pg/ml)        On treatment: None  - Vitamin D; level: Not checked recently        On supplement: Yes  - Calcium; level: Normal        On supplement: Yes    # Electrolytes:   - Potassium; level: Normal        On supplement: No  - Magnesium; level: Not checked recently        On supplement: Yes  - Bicarbonate; level: Normal        On supplement: No    # Obesity, Class I (BMI = 35.51): Stable weight.   - Recommend weight loss for overall health by increasing exercise and watching  caloric intake.    # Possible PAD: Patient with right leg wound and is being worked up for lower extremity vascular disease and was also referred to Vascular Surgery.    # H/o Latent TB: Patient supposedly completed treatment with isoniazid, but not completely sure how many months.  Plan was to refer to Transplant ID, but that hasn't happened.   - Will refer to Transplant ID.    # Skin Cancer Risk:    - Discussed sun protection and recommend regular follow up with Dermatology.    # Health Maintenance and Vaccination Review: Recommend:  Flu and COVID    # Transplant History:  Etiology of Kidney Failure: Diabetes mellitus type 2  Tx: LDKT  Transplant: 2/1/2023 (Kidney)  Significant changes in immunosuppression: None  Significant transplant-related complications: None    Transplant Office Phone Number: [phone]    Assessment and plan was discussed with the patient and he voiced his understanding and agreement.    Return visit: Return in about 6 months (around 3/16/2025).    Zaid Deng MD    The longitudinal plan of care for kidney transplant was addressed during this visit. Due to the added complexity in care, I will continue to support Jass Fierro in the subsequent management of this condition(s) and with the ongoing continuity of care of this condition(s).    Chief Complaint  Mr. Fierro is a 73 year old here for kidney transplant and immunosuppression management.    History of Present Illness    Feels good overall, main issues discussed today:  - reports recent L big toe infection completed a course of antibiotics clindamycin+cipro, follows with podiatry; xray foot: no osteomyelitis. Denies any drainage/pain  - occasional leg swelling, no chest pain or dyspnea  - DM well controlled, sugars 90-100s in am, on insulin  - no graft pain or urinary symptoms  - no fevers, chills, dyspnea    Home BP:  not checked    Problem List  Patient Active Problem List   Diagnosis     Diabetes mellitus, type 2 (H)      "Nonspecific reaction to tuberculin skin test without active tuberculosis     Anal fissure     HTN, kidney transplant related     TYPE 2 DIABETES, HBA1C GOAL < 7%     CARDIOVASCULAR SCREENING; LDL GOAL LESS THAN 100     Non-proliferative diabetic retinopathy, both eyes (H)     Pseudophakia, right eye     Cataract, left     Kidney replaced by transplant     Aftercare following organ transplant     Chronic kidney disease, stage 2 (mild)     Need for pneumocystis prophylaxis     Immunosuppressed status (H24)     Secondary renal hyperparathyroidism (H24)     Vitamin D deficiency     Hypomagnesemia     Obesity       Allergies  Allergies   Allergen Reactions     Gramineae Pollens Itching     \"seasonal\"     Seasonal Allergies Itching       Medications  Current Outpatient Medications   Medication Sig Dispense Refill     acetaminophen (TYLENOL) 500 MG tablet Take 500-1,000 mg by mouth every 8 hours as needed.       amLODIPine (NORVASC) 10 MG tablet Take 1 tablet by mouth daily       ASPIRIN 81 MG OR TABS ONE DAILY 100 3     atorvastatin (LIPITOR) 40 MG tablet Take 1 tablet by mouth daily       blood glucose monitoring (NO BRAND SPECIFIED) meter device kit Use to test blood sugar 3 times daily or as directed. 1 kit 0     Calcium Carb-Cholecalciferol (CALCIUM 500 + D) 500-3.125 MG-MCG TABS Take 1 tablet by mouth daily       Continuous Glucose Sensor (FREESTYLE BLAKE 2 SENSOR) MISC        insulin aspart (NOVOLOG FLEXPEN) 100 UNIT/ML pen Inject 13 Units Subcutaneous 3 times daily (with meals)       insulin glargine (LANTUS PEN) 100 UNIT/ML pen Inject 30 Units Subcutaneous at bedtime       Lancets MISC        magnesium oxide (MAG-OX) 400 MG tablet Take 1 tablet (400 mg) by mouth daily Take at 12 PM 90 tablet 3     metoprolol succinate ER (TOPROL XL) 50 MG 24 hr tablet Take 50 mg by mouth daily       mycophenolic acid (GENERIC EQUIVALENT) 180 MG EC tablet Take 1 tablet (180 mg) by mouth 2 times daily Total dose = 540 mg twice per " day 60 tablet 11     mycophenolic acid (GENERIC EQUIVALENT) 360 MG EC tablet Take 1 tablet (360 mg) by mouth 2 times daily Total dose = 540 mg twice per day 60 tablet 11     predniSONE (DELTASONE) 5 MG tablet Take 1 tablet (5 mg) by mouth daily 90 tablet 3     tacrolimus (GENERIC) 0.5 MG capsule Take 1 capsule (0.5 mg) by mouth 2 times daily Total dose = 1.5 mg every 12 hours 180 capsule 3     tacrolimus (GENERIC) 1 MG capsule Take 1 capsule (1 mg) by mouth 2 times daily Total dose = 1.5 mg every 12 hours 180 capsule 3     tamsulosin (FLOMAX) 0.4 MG capsule Take 0.4 mg by mouth       torsemide (DEMADEX) 10 MG tablet Take 1 tablet (10 mg) by mouth daily 90 tablet 1     COMPRESSION STOCKINGS Please measure and distribute 2 pair of 20mmHg - 30mmHg knee high open or closed toe compression stockings with extra refills as indicated or what insurance will allow . (Patient not taking: Reported on 9/16/2024) 2 each 4     No current facility-administered medications for this visit.     There are no discontinued medications.      Physical Exam  Vital Signs: /80 (BP Location: Left arm, Patient Position: Sitting, Cuff Size: Adult Large)   Pulse 71   Temp 98.6  F (37  C) (Oral)   Resp 18   Wt 103.9 kg (229 lb)   SpO2 98%   BMI 35.51 kg/m      GENERAL APPEARANCE: alert and no distress  HENT: mouth without ulcers or lesions  RESP: lungs clear to auscultation - no rales, rhonchi or wheezes  CV: regular rhythm, normal rate, no rub, no murmur  EDEMA: trace to 1+ LE edema bilaterally  ABDOMEN: soft, nondistended, nontender, bowel sounds normal, obese  MS: extremities normal - no gross deformities noted, no evidence of inflammation in joints, no muscle tenderness  SKIN: no rash  TX KIDNEY: normal  DIALYSIS ACCESS:  RUE AV fistula with good thrill  L toe c/d/i  Data        Latest Ref Rng & Units 9/16/2024     2:11 PM 6/24/2024     6:34 AM 4/15/2024     7:40 AM   Renal   Sodium 135 - 145 mmol/L 136  138  139    K 3.4 - 5.3  mmol/L 4.6  4.4  4.2    Cl 98 - 107 mmol/L 102  105  105    Cl (external) 98 - 107 mmol/L 102  105  105    CO2 22 - 29 mmol/L 23  24  24    Urea Nitrogen 8.0 - 23.0 mg/dL 19.4  18.8  19.4    Creatinine 0.67 - 1.17 mg/dL 1.18  1.20  1.19    Glucose 70 - 99 mg/dL 135  202  125    Calcium 8.8 - 10.4 mg/dL 9.4  9.2  9.3          Latest Ref Rng & Units 2/15/2024     7:36 AM 2/1/2024     8:18 AM 9/22/2023     8:31 AM   Bone Health   Phosphorus 2.5 - 4.5 mg/dL   3.9    Parathyroid Hormone Intact 15 - 65 pg/mL 112  101     Vit D Def 20 - 50 ng/mL 27  26           Latest Ref Rng & Units 9/16/2024     2:11 PM 6/24/2024     6:34 AM 4/15/2024     7:40 AM   Heme   WBC 4.0 - 11.0 10e3/uL 11.4  9.1  5.5    Hgb 13.3 - 17.7 g/dL 12.9  13.5  13.0    Plt 150 - 450 10e3/uL 211  194  184          Latest Ref Rng & Units 4/15/2024     7:40 AM 2/1/2024     8:18 AM 11/16/2023     8:24 AM   Liver   AP 40 - 150 U/L 79  84  97    TBili <=1.2 mg/dL 0.6  0.5  0.4    Bilirubin Direct 0.00 - 0.30 mg/dL <0.20  <0.20  <0.20    ALT 0 - 70 U/L 24  39  33    AST 0 - 45 U/L 25  30  29    Tot Protein 6.4 - 8.3 g/dL 6.8  6.8  6.9    Albumin 3.5 - 5.2 g/dL 4.1  4.2  4.2          Latest Ref Rng & Units 2/1/2024     8:18 AM 8/15/2023     7:40 AM 6/20/2023     7:52 AM   Pancreas   A1C <5.7 % 7.5  7.8  8.9          Latest Ref Rng & Units 9/9/2021    12:28 PM 8/12/2011     9:49 AM   Iron studies   Iron 35 - 180 ug/dL 37  55    Iron Saturation Index 15 - 46 % 16  21    Ferritin 26 - 388 ng/mL 364  117          Latest Ref Rng & Units 6/20/2023     7:52 AM 8/12/2011     9:49 AM   [facility] Txp Virology   EBV CAPSID ANTIBODY IGG No detectable antibody. Positive     Hep B Surf   327.0        Recent Labs   Lab Test 03/15/24  0734 04/15/24  0740 06/24/24  0634   DOSTAC 3/14/2024 4/14/2024 6/23/2024   TACROL 6.0 6.8 6.8     Recent Labs   Lab Test 06/20/23  0752   DOSMPA 6/19/2023   7:00 PM   MPACID 0.92*   MPAG 32.9          Again, thank you for allowing me to participate  in the care of your patient.        Sincerely,        Zaid Deng MD

## 2024-09-16 NOTE — NURSING NOTE
"Chief Complaint   Patient presents with    RECHECK     Post kidney transplant 2/1/2023     Vital signs:  Temp: 98.6  F (37  C) Temp src: Oral BP: 139/80 Pulse: 71   Resp: 18 SpO2: 98 %       Weight: 103.9 kg (229 lb)  Estimated body mass index is 35.51 kg/m  as calculated from the following:    Height as of 11/9/23: 1.71 m (5' 7.33\").    Weight as of this encounter: 103.9 kg (229 lb).      Kendal Harris, [facility]  9/16/2024 2:39 PM    "

## 2024-09-16 NOTE — PROGRESS NOTES
TRANSPLANT NEPHROLOGY CHRONIC POST TRANSPLANT VISIT    Recommendations:  - repeat cbc with diff   - UA Ucx  - f/up podiatry    Assessment & Plan   # LDKT: CKD stage 3aA1 Stable   - Baseline Creatinine:  ~ 1.1-1.3   - Proteinuria: Normal (<0.2 grams)   - Date DSA Last Checked: Not Known      Latest DSA: Transplanted at outside Tx Program (Transplanted in [facility])   - BK Viremia: No   - Kidney Tx Biopsy: No    # Immunosuppression: Tacrolimus immediate release (goal 4-6), Mycophenolic acid (dose 540 mg every 12 hours), and Prednisone (dose 5 mg daily)   - Continue with intensive monitoring of immunosuppression for efficacy and toxicity.   - On prednisone due to being transplanted at outside Transplant Program ([facility]) and came back to [place] it.   - Changes: Not at this time    # Infection Prophylaxis:   - PJP: Sulfa/TMP (Bactrim)    # Leukocytosis:   - repeat CBC with diff   - UA Ucx   - f/up podiatry    # Hypertension: Borderline control;  Goal BP: < 130/80   - Changes: Not at this time    # Diabetes: Borderline control (HbA1c 7-9%) Last HbA1c: 7.8%   - Management as per primary care.    # Mineral Bone Disorder:    - Secondary renal hyperparathyroidism; PTH level: Mildly elevated (151-300 pg/ml)        On treatment: None  - Vitamin D; level: Not checked recently        On supplement: Yes  - Calcium; level: Normal        On supplement: Yes    # Electrolytes:   - Potassium; level: Normal        On supplement: No  - Magnesium; level: Not checked recently        On supplement: Yes  - Bicarbonate; level: Normal        On supplement: No    # Obesity, Class I (BMI = 35.51): Stable weight.   - Recommend weight loss for overall health by increasing exercise and watching caloric intake.    # Possible PAD: Patient with right leg wound and is being worked up for lower extremity vascular disease and was also referred to Vascular Surgery.    # H/o Latent TB: Patient supposedly completed treatment with isoniazid, but not completely  sure how many months.  Plan was to refer to Transplant ID, but that hasn't happened.   - Will refer to Transplant ID.    # Skin Cancer Risk:    - Discussed sun protection and recommend regular follow up with Dermatology.    # Health Maintenance and Vaccination Review: Recommend:  Flu and COVID    # Transplant History:  Etiology of Kidney Failure: Diabetes mellitus type 2  Tx: LDKT  Transplant: 2/1/2023 (Kidney)  Significant changes in immunosuppression: None  Significant transplant-related complications: None    Transplant Office Phone Number: [phone]    Assessment and plan was discussed with the patient and he voiced his understanding and agreement.    Return visit: Return in about 6 months (around 3/16/2025).    Zaid Deng MD    The longitudinal plan of care for kidney transplant was addressed during this visit. Due to the added complexity in care, I will continue to support Jass Fierro in the subsequent management of this condition(s) and with the ongoing continuity of care of this condition(s).    Chief Complaint   Mr. Fierro is a 73 year old here for kidney transplant and immunosuppression management.    History of Present Illness     Feels good overall, main issues discussed today:  - reports recent L big toe infection completed a course of antibiotics clindamycin+cipro, follows with podiatry; xray foot: no osteomyelitis. Denies any drainage/pain  - occasional leg swelling, no chest pain or dyspnea  - DM well controlled, sugars 90-100s in am, on insulin  - no graft pain or urinary symptoms  - no fevers, chills, dyspnea    Home BP:  not checked    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 tuberculosis    Anal fissure    HTN, kidney transplant related    TYPE 2 DIABETES, HBA1C GOAL < 7%    CARDIOVASCULAR SCREENING; LDL GOAL LESS THAN 100    Non-proliferative diabetic retinopathy, both  "eyes (H)    Pseudophakia, right eye    Cataract, left    Kidney replaced by transplant    Aftercare following organ transplant    Chronic kidney disease, stage 2 (mild)    Need for pneumocystis prophylaxis    Immunosuppressed status (H24)    Secondary renal hyperparathyroidism (H24)    Vitamin D deficiency    Hypomagnesemia    Obesity       Allergies   Allergies   Allergen Reactions    Gramineae Pollens Itching     \"seasonal\"    Seasonal Allergies Itching       Medications   Current Outpatient Medications   Medication Sig Dispense Refill    acetaminophen (TYLENOL) 500 MG tablet Take 500-1,000 mg by mouth every 8 hours as needed.      amLODIPine (NORVASC) 10 MG tablet Take 1 tablet by mouth daily      ASPIRIN 81 MG OR TABS ONE DAILY 100 3    atorvastatin (LIPITOR) 40 MG tablet Take 1 tablet by mouth daily      blood glucose monitoring (NO BRAND SPECIFIED) meter device kit Use to test blood sugar 3 times daily or as directed. 1 kit 0    Calcium Carb-Cholecalciferol (CALCIUM 500 + D) 500-3.125 MG-MCG TABS Take 1 tablet by mouth daily      Continuous Glucose Sensor (FREESTYLE BLAKE 2 SENSOR) MISC       insulin aspart (NOVOLOG FLEXPEN) 100 UNIT/ML pen Inject 13 Units Subcutaneous 3 times daily (with meals)      insulin glargine (LANTUS PEN) 100 UNIT/ML pen Inject 30 Units Subcutaneous at bedtime      Lancets MISC       magnesium oxide (MAG-OX) 400 MG tablet Take 1 tablet (400 mg) by mouth daily Take at 12 PM 90 tablet 3    metoprolol succinate ER (TOPROL XL) 50 MG 24 hr tablet Take 50 mg by mouth daily      mycophenolic acid (GENERIC EQUIVALENT) 180 MG EC tablet Take 1 tablet (180 mg) by mouth 2 times daily Total dose = 540 mg twice per day 60 tablet 11    mycophenolic acid (GENERIC EQUIVALENT) 360 MG EC tablet Take 1 tablet (360 mg) by mouth 2 times daily Total dose = 540 mg twice per day 60 tablet 11    predniSONE (DELTASONE) 5 MG tablet Take 1 tablet (5 mg) by mouth daily 90 tablet 3    tacrolimus (GENERIC) 0.5 MG " capsule Take 1 capsule (0.5 mg) by mouth 2 times daily Total dose = 1.5 mg every 12 hours 180 capsule 3    tacrolimus (GENERIC) 1 MG capsule Take 1 capsule (1 mg) by mouth 2 times daily Total dose = 1.5 mg every 12 hours 180 capsule 3    tamsulosin (FLOMAX) 0.4 MG capsule Take 0.4 mg by mouth      torsemide (DEMADEX) 10 MG tablet Take 1 tablet (10 mg) by mouth daily 90 tablet 1    COMPRESSION STOCKINGS Please measure and distribute 2 pair of 20mmHg - 30mmHg knee high open or closed toe compression stockings with extra refills as indicated or what insurance will allow . (Patient not taking: Reported on 9/16/2024) 2 each 4     No current facility-administered medications for this visit.     There are no discontinued medications.      Physical Exam   Vital Signs: /80 (BP Location: Left arm, Patient Position: Sitting, Cuff Size: Adult Large)   Pulse 71   Temp 98.6  F (37  C) (Oral)   Resp 18   Wt 103.9 kg (229 lb)   SpO2 98%   BMI 35.51 kg/m      GENERAL APPEARANCE: alert and no distress  HENT: mouth without ulcers or lesions  RESP: lungs clear to auscultation - no rales, rhonchi or wheezes  CV: regular rhythm, normal rate, no rub, no murmur  EDEMA: trace to 1+ LE edema bilaterally  ABDOMEN: soft, nondistended, nontender, bowel sounds normal, obese  MS: extremities normal - no gross deformities noted, no evidence of inflammation in joints, no muscle tenderness  SKIN: no rash  TX KIDNEY: normal  DIALYSIS ACCESS:  RUE AV fistula with good thrill  L toe c/d/i  Data         Latest Ref Rng & Units 9/16/2024     2:11 PM 6/24/2024     6:34 AM 4/15/2024     7:40 AM   Renal   Sodium 135 - 145 mmol/L 136  138  139    K 3.4 - 5.3 mmol/L 4.6  4.4  4.2    Cl 98 - 107 mmol/L 102  105  105    Cl (external) 98 - 107 mmol/L 102  105  105    CO2 22 - 29 mmol/L 23  24  24    Urea Nitrogen 8.0 - 23.0 mg/dL 19.4  18.8  19.4    Creatinine 0.67 - 1.17 mg/dL 1.18  1.20  1.19    Glucose 70 - 99 mg/dL 135  202  125    Calcium 8.8 -  10.4 mg/dL 9.4  9.2  9.3          Latest Ref Rng & Units 2/15/2024     7:36 AM 2/1/2024     8:18 AM 9/22/2023     8:31 AM   Bone Health   Phosphorus 2.5 - 4.5 mg/dL   3.9    Parathyroid Hormone Intact 15 - 65 pg/mL 112  101     Vit D Def 20 - 50 ng/mL 27  26           Latest Ref Rng & Units 9/16/2024     2:11 PM 6/24/2024     6:34 AM 4/15/2024     7:40 AM   Heme   WBC 4.0 - 11.0 10e3/uL 11.4  9.1  5.5    Hgb 13.3 - 17.7 g/dL 12.9  13.5  13.0    Plt 150 - 450 10e3/uL 211  194  184          Latest Ref Rng & Units 4/15/2024     7:40 AM 2/1/2024     8:18 AM 11/16/2023     8:24 AM   Liver   AP 40 - 150 U/L 79  84  97    TBili <=1.2 mg/dL 0.6  0.5  0.4    Bilirubin Direct 0.00 - 0.30 mg/dL <0.20  <0.20  <0.20    ALT 0 - 70 U/L 24  39  33    AST 0 - 45 U/L 25  30  29    Tot Protein 6.4 - 8.3 g/dL 6.8  6.8  6.9    Albumin 3.5 - 5.2 g/dL 4.1  4.2  4.2          Latest Ref Rng & Units 2/1/2024     8:18 AM 8/15/2023     7:40 AM 6/20/2023     7:52 AM   Pancreas   A1C <5.7 % 7.5  7.8  8.9          Latest Ref Rng & Units 9/9/2021    12:28 PM 8/12/2011     9:49 AM   Iron studies   Iron 35 - 180 ug/dL 37  55    Iron Saturation Index 15 - 46 % 16  21    Ferritin 26 - 388 ng/mL 364  117          Latest Ref Rng & Units 6/20/2023     7:52 AM 8/12/2011     9:49 AM   [facility] Virology   EBV CAPSID ANTIBODY IGG No detectable antibody. Positive     Hep B Surf   327.0        Recent Labs   Lab Test 03/15/24  0734 04/15/24  0740 06/24/24  0634   DOSTAC 3/14/2024 4/14/2024 6/23/2024   TACROL 6.0 6.8 6.8     Recent Labs   Lab Test 06/20/23  0752   DOSMPA 6/19/2023   7:00 PM   MPACID 0.92*   MPAG 32.9

## 2024-09-17 ENCOUNTER — OFFICE VISIT (OUTPATIENT)
Dept: VASCULAR SURGERY | Facility: CLINIC | Age: 74
End: 2024-09-17
Payer: COMMERCIAL

## 2024-09-17 DIAGNOSIS — I70.213 ATHEROSCLEROSIS OF NATIVE ARTERY OF BOTH LOWER EXTREMITIES WITH INTERMITTENT CLAUDICATION (H): Primary | ICD-10-CM

## 2024-09-17 LAB
BK VIRUS SPECIMEN TYPE: NORMAL
BKV DNA # SPEC NAA+PROBE: NOT DETECTED IU/ML

## 2024-09-17 PROCEDURE — 99214 OFFICE O/P EST MOD 30 MIN: CPT | Mod: GC | Performed by: RADIOLOGY

## 2024-09-17 NOTE — LETTER
9/17/2024       RE: Jass Fierro  [address]  [place] [address]     Dear Colleague,    Thank you for referring your patient, Jass Fierro, to the [facility] [place] [facility]. Please see a copy of my visit note below.        INTERVENTIONAL RADIOLOGY CONSULTATION    Name: Jass Fierro  Age: 73 year old   Referring Physician: Dr. Urbina   REASON FOR REFERRAL: PAD     HPI:   Mr. Fierro is a very pleasant 73-year-old gentleman with peripheral arterial disease, last evaluated on May 2024 at which time he was wound free and plan was to follow on a as needed basis. He has a history of diabetes. He takes a baby aspirin and a statin.     Today he is seen for concern of new left great toe ischemic ulcer per podiatry. Wound on the left great toe which he is following with podiatry for. He first noticed the wound about two months ago and thinks it was related to his shoe rubbing. He does not normally feel his feet but if the wound it touched it is tender. He denies claudication but does have left hip tenderness. He is not very active. He has intermittent swelling in the legs which has compression for but he questions if it was sized properly as when he wore it he had more foot swelling and reduced leg swelling. Uses a cane to walk.     History was gathered with a combination of a telephone [place]  ID number [id number] as well as family (cousin and wife).     PAST MEDICAL HISTORY:   Past Medical History:   Diagnosis Date     Anal fissure      Kidney problem     causes leg swelling, underfunctioning     Non-proliferative diabetic retinopathy, both eyes (H) 9/16/2015     Nonsenile cataract      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     finished 9 months INH 7/03-4/04     Type II or unspecified type diabetes mellitus without mention of complication, not stated as uncontrolled      Unspecified essential hypertension         PAST SURGICAL HISTORY:   Past Surgical History:   Procedure Laterality Date     CATARACT IOL, RT/LT Right 2012    doctor and clinic unknown to pt     PHACOEMULSIFICATION WITH STANDARD INTRAOCULAR LENS IMPLANT Left 3/1/2019    Procedure: Left Eye Cataract Removal with Intraocular Lens Implant;  Surgeon: Kendal Vazquez MD;  Location:  OR     [facility] NONSPECIFIC PROCEDURE  03/2002    Fistulotomy + partial lat. internal sphincterotomy       FAMILY HISTORY:   Family History   Problem Relation Age of Onset     Family History Negative Other      Diabetes Other      Hypertension Other      Diabetes Mother      Diabetes Father      Hypertension Father      Hypertension Brother      Cancer No family hx of      Glaucoma No family hx of        SOCIAL HISTORY:   Social History     Tobacco Use     Smoking status: Never     Smokeless tobacco: Not on file   Substance Use Topics     Alcohol use: No       PROBLEM LIST:   Patient Active Problem List    Diagnosis Date Noted     Class 2 severe obesity due to excess calories with serious comorbidity in adult (H) 09/16/2024     Priority: Medium     Kidney replaced by transplant 02/02/2024     Priority: Medium     Aftercare following organ transplant 02/02/2024     Priority: Medium     Chronic kidney disease, stage 2 (mild) 02/02/2024     Priority: Medium     Need for pneumocystis prophylaxis 02/02/2024     Priority: Medium     Immunosuppressed status (H24) 02/02/2024     Priority: Medium     Secondary renal hyperparathyroidism (H24) 02/02/2024     Priority: Medium     Vitamin D deficiency 02/02/2024     Priority: Medium     Hypomagnesemia 02/02/2024     Priority: Medium     Obesity 02/02/2024     Priority: Medium     Non-proliferative diabetic retinopathy, both eyes (H) 09/16/2015     Priority: Medium     Pseudophakia, right eye 09/16/2015     Priority: Medium     Cataract, left 09/16/2015     Priority: Medium     TYPE 2 DIABETES, HBA1C GOAL < 7% 10/31/2010     Priority: Medium      CARDIOVASCULAR SCREENING; LDL GOAL LESS THAN 100 10/31/2010     Priority: Medium     Diabetes mellitus, type 2 (H) 04/22/2004     Priority: Medium     Problem list name updated by automated process. Provider to review       Nonspecific reaction to tuberculin skin test without active tuberculosis 04/22/2004     Priority: Medium     Problem list name updated by automated process. Provider to review and confirm  Problem list name updated by automated process. Provider to review       Anal fissure 04/22/2004     Priority: Medium     HTN, kidney transplant related 04/22/2004     Priority: Medium     Problem list name updated by automated process. Provider to review         MEDICATIONS:   Prescription Medications as of 9/17/2024         Rx Number Disp Refills Start End Last Dispensed Date Next Fill Date Owning Pharmacy    acetaminophen (TYLENOL) 500 MG tablet  -- -- 3/21/2024 --       Sig: Take 500-1,000 mg by mouth every 8 hours as needed.    Class: Historical    Route: Oral    amLODIPine (NORVASC) 10 MG tablet  -- -- 6/6/2023 --       Sig: Take 1 tablet by mouth daily    Class: Historical    Route: Oral    ASPIRIN 81 MG OR TABS  100 3 3/3/2008 --   [id number] - [place], [place] - [address] [facility] [address]    Sig: ONE DAILY    Class: Fax    Route: Oral    atorvastatin (LIPITOR) 40 MG tablet  -- -- 6/6/2023 --       Sig: Take 1 tablet by mouth daily    Class: Historical    Route: Oral    blood glucose monitoring (NO BRAND SPECIFIED) meter device kit  1 kit 0 6/20/2023 --   [facility]/pharmacy #[id number] - [place], [place] - [address]    Sig: Use to test blood sugar 3 times daily or as directed.    Class: E-Prescribe    Calcium Carb-Cholecalciferol (CALCIUM 500 + D) 500-3.125 MG-MCG TABS  -- -- 8/1/2023 --       Sig: Take 1 tablet by mouth daily    Class: Historical    Route: Oral    COMPRESSION STOCKINGS  2 each 4 2/13/2024 --       Sig: Please measure and distribute 2 pair of  20mmHg - 30mmHg knee high open or closed toe compression stockings with extra refills as indicated or what insurance will allow .    Class: Local Print    Continuous Glucose Sensor (FREESTYLE BLAKE 2 SENSOR) [facility]  -- -- 5/6/2024 --       Class: Historical    insulin aspart (NOVOLOG FLEXPEN) 100 UNIT/ML pen  -- --  --       Sig: Inject 13 Units Subcutaneous 3 times daily (with meals)    Class: Historical    Route: Subcutaneous    insulin glargine (LANTUS PEN) 100 UNIT/ML pen  -- --  --       Sig: Inject 30 Units Subcutaneous at bedtime    Class: Historical    Route: Subcutaneous    Lancets MISC  -- -- 5/7/2024 --       Class: Historical    magnesium oxide (MAG-OX) 400 MG tablet  90 tablet 3 9/26/2023 --   [facility]/pharmacy #[id number] [place], [place] - [address]    Sig: Take 1 tablet (400 mg) by mouth daily Take at 12 PM    Class: E-Prescribe    Route: Oral    metoprolol succinate ER (TOPROL XL) 50 MG 24 hr tablet  -- --  --       Sig: Take 50 mg by mouth daily    Class: Historical    Route: Oral    mycophenolic acid (GENERIC EQUIVALENT) 180 MG EC tablet  60 tablet 11 12/28/2023 --   [address]    Sig: Take 1 tablet (180 mg) by mouth 2 times daily Total dose = 540 mg twice per day    Class: E-Prescribe    Notes to Pharmacy: TXP DT 2/1/2023 (Kidney) TXP Dischg DT  DX Kidney replaced by transplant Z94.0 [facility]    Route: Oral    mycophenolic acid (GENERIC EQUIVALENT) 360 MG EC tablet  60 tablet 11 12/28/2023 --   [address]    Sig: Take 1 tablet (360 mg) by mouth 2 times daily Total dose = 540 mg twice per day    Class: E-Prescribe    Notes to Pharmacy: TXP DT 2/1/2023 (Kidney) TXP Dischg DT  DX Kidney replaced by transplant Z94.0 [facility]    Route: Oral    predniSONE (DELTASONE) 5 MG tablet  90 tablet 3 7/3/2023 --   [facility]/pharmacy #[id number] [place], [place] - [address]    Sig: Take 1  tablet (5 mg) by mouth daily    Class: E-Prescribe    Route: Oral    tacrolimus (GENERIC) 0.5 MG capsule  180 capsule 3 2/5/2024 --   [facility] #[id number] - [place], [place] - [address]    Sig: Take 1 capsule (0.5 mg) by mouth 2 times daily Total dose = 1.5 mg every 12 hours    Class: E-Prescribe    Notes to Pharmacy: TXP DT 2/1/2023 (Kidney) TXP Dischg DT  DX Kidney replaced by transplant Z94.0 [facility]    Route: Oral    tacrolimus (GENERIC) 1 MG capsule  180 capsule 3 2/5/2024 --   [id number] - [place], [place] - [address]    Sig: Take 1 capsule (1 mg) by mouth 2 times daily Total dose = 1.5 mg every 12 hours    Class: E-Prescribe    Notes to Pharmacy: TXP DT 2/1/2023 (Kidney) TXP Dischg DT  DX Kidney replaced by transplant Z94.0 [facility]    Route: Oral    tamsulosin (FLOMAX) 0.4 MG capsule  -- -- 5/7/2024 --       Sig: Take 0.4 mg by mouth    Class: Historical    Route: Oral    torsemide (DEMADEX) 10 MG tablet  90 tablet 1 2/1/2024 --   [facility] - [place], [place] - [address] [facility] [id number]    Sig: Take 1 tablet (10 mg) by mouth daily    Class: E-Prescribe    Route: Oral            ALLERGIES:   Gramineae pollens and Seasonal allergies    ROS:  Negative unless otherwise stated in HPI.      Physical Examination:   VITALS:   There were no vitals taken for this visit.    Gen: alert and oriented  HEENT: normocephalic and atraumatic  Resp: equal chest rise  Extremities: Mild swelling at the level of the ankles. Wound to left great toe. Photos added to media tab.     Labs:    BMP RESULTS:  Lab Results   Component Value Date     09/16/2024     05/07/2012    POTASSIUM 4.6 09/16/2024    POTASSIUM 4.4 08/06/2021    POTASSIUM 5.2 05/07/2012    CHLORIDE 102 09/16/2024    CHLORIDE 104 08/06/2021    CHLORIDE 105 05/07/2012    CO2 23 09/16/2024    CO2 17 (L) 08/06/2021    CO2 23 05/07/2012    ANIONGAP 11 09/16/2024    ANIONGAP 11 08/06/2021    ANIONGAP 11  "05/07/2012     (H) 09/16/2024     (H) 06/14/2023     (H) 08/06/2021     (H) 05/07/2012    BUN 19.4 09/16/2024    BUN 77 (H) 08/06/2021    BUN 49 (H) 05/07/2012    CR 1.18 (H) 09/16/2024    CR 3.74 (H) 05/07/2012    GFRESTIMATED 64 09/16/2024    GFRESTIMATED 16 (L) 05/07/2012    GFRESTBLACK 20 (L) 05/07/2012    BOB 9.4 09/16/2024    BOB 9.2 05/07/2012        CBC RESULTS:  Lab Results   Component Value Date    WBC 11.4 (H) 09/16/2024    WBC 7.5 05/07/2012    RBC 4.67 09/16/2024    RBC 4.47 05/07/2012    HGB 12.9 (L) 09/16/2024    HGB 12.5 (L) 05/07/2012    HCT 40.7 09/16/2024    HCT 37.1 (L) 05/07/2012    MCV 87 09/16/2024    MCV 83 05/07/2012    MCH 27.6 09/16/2024    MCH 28.0 05/07/2012    MCHC 31.7 09/16/2024    MCHC 33.7 05/07/2012    RDW 14.3 09/16/2024    RDW 14.7 05/07/2012     09/16/2024     05/07/2012       INR/PTT:  No results found for: \"INR\", \"PTT\"    Diagnostic studies: 8/20/2024  TCO2 with marginal would healing capability in the left foot    ESTEBAN 0.99 on the right (previously non compressible) and non compressible on the left (unchanged)     Assessment   Jass is a 73 year old male with a history of PAD and waxing and waning wounds. Today he has a new left great toe wound which classifies him as debbie 5 for chronic limb ischemia. In the past he has elected for conservative management and has had good results with wound healing. Today he presents for ischemic ulcer to the left great toe. The process of lower extremity angiogram was described as well as the risks and benefits including limb salvage, bleeding, infection, and risk of contrast load to his renal transplant. The patient and family demonstrated understanding and wish to proceed with ultrasound evaluation and diagnostic angiogram with possible intervention.       Plan   - Continue with podiatry and wound clinic  - Arterial ultrasound of the left lower extremity  - New compression stockings  - " Probable left lower extremity angiogram   - If angiogram is scheduled will need plavix (300 mg) load 5 days prior to procedure followed by 75 daily, do not hold for angiogram.    I interviewed and examined the patient with the resident and agree with the assessment and plan. [place] chronic ischemia category 5 with minor tissue loss/ulceration, no signs of infection, no rest pain.    Zaira Bowman MD      Patient Care Team:  Micheal Zimmer MD as PCP - General (Internal Medicine)  José Miguel Camarillo MD as MD (Ophthalmology)  Micheal Zimmer MD (Internal Medicine)  Kendal Vazquez MD as MD (Ophthalmology)  Sarthak Nunez Chi, OD as MD (Optometry)  Sarah Solano MD as MD (Ophthalmology)  Elizabeth Hope MD as MD (Nephrology)  Zaid Jacob MD as Assigned Nephrology Provider  Agapito Ramírez [facility] as Pharmacist (Pharmacist)  Agapito Ramírez [facility] as Assigned MTM Pharmacist  Ceferino Parikh OD as MD (Optometry)  Gurmeet Paiz DPM as Assigned Musculoskeletal Provider  ZAIRA BOWMAN      ----- Service Performed and Documented by Resident or Fellow ------        Atherosclerosis of native artery of both lower extremities with intermittent claudication (H24)    - US Lower Extremity Arterial Duplex Left; Future     Review of external notes as documented above   Independent interpretation of a test performed by another physician/other qualified health care professional (not separately reported) - ESTEBAN and TCO2 8/20/2024                       Lopez Nur MD           Again, thank you for allowing me to participate in the care of your patient.      Sincerely,    Zaira Bowman MD

## 2024-09-17 NOTE — PATIENT INSTRUCTIONS
Compression stockings  Clean wound as directed by podiatrist.   Ultrasound-we will have our  call you with this appointment.      You have been prescribed  compression stockings . Please bring your prescription to a [facility] location to  your item.       [facility] Locations:   Visit [email or web address]      [facility]  [address], [address]  [place] [address]  [phone]  Monday - Friday, 8 a.m. to 4:30 p.m.      [facility]  [address], [address]  [place] [address]  [phone]  Monday - Friday, 8 a.m. to 4:30 p.m.   [facility]  [address]  [place] [address]  [phone]  Monday - Friday, 8 a.m. to 4:30 p.m.     [facility]  [address], [address]  [place] [address]  [phone]  Monday - Friday, 8 a.m. to 4:30 p.m.  *Closed daily Noon to 1 p.m. for patient deliveries     [place]  [facility]  [address], [address]     [place] [address]            [phone]  Monday - Friday, 8 a.m. to 5:00 p.m.      [facility]  [address], [address]  [place], [place] [address] [phone]  Monday - Friday, 8 a.m. to 4:30 p.m.        [facility]  [address], [address]  [place], [place] [address] [phone]  Monday - Friday, 8 a.m. to 4:30 p.m.          Other Home Medical Equipment Locations:     [employer] [facility]  [address]  [place] [address]  [phone]    [facility] [email or web address]  [address] [place] [address]  [phone]    [facility]  [email or web address]  [address] [place] [address]  Phone: [phone] [address] [place] [address]  Phone: [phone]    [address]  [address] [place] [address]  Phone: [phone]    [address], [place] [address]  Phone: [phone] [address], [place] [address]  Phone: [phone]    [facility] [email or web address]  [address] [place] [address]  Phone: [phone]    [address], [place] [address]  Phone: [phone]    [facility] [place] [address]  Phone: [phone]

## 2024-09-17 NOTE — PROGRESS NOTES
INTERVENTIONAL RADIOLOGY CONSULTATION    Name: Jass Fierro  Age: 73 year old   Referring Physician: Dr. Urbina   REASON FOR REFERRAL: PAD     HPI:   Mr. Fierro is a very pleasant 73-year-old gentleman with peripheral arterial disease, last evaluated on May 2024 at which time he was wound free and plan was to follow on a as needed basis. He has a history of diabetes. He takes a baby aspirin and a statin.     Today he is seen for concern of new left great toe ischemic ulcer per podiatry. Wound on the left great toe which he is following with podiatry for. He first noticed the wound about two months ago and thinks it was related to his shoe rubbing. He does not normally feel his feet but if the wound it touched it is tender. He denies claudication but does have left hip tenderness. He is not very active. He has intermittent swelling in the legs which has compression for but he questions if it was sized properly as when he wore it he had more foot swelling and reduced leg swelling. Uses a cane to walk.     History was gathered with a combination of a telephone [facility]  ID number [id number] as well as family (cousin and wife).     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 internal sphincterotomy       FAMILY HISTORY:   Family History   Problem Relation Age of Onset    Family History Negative Other     Diabetes Other     Hypertension Other     Diabetes Mother     Diabetes Father     Hypertension Father     Hypertension Brother     Cancer No family hx of     Glaucoma No family hx of        SOCIAL HISTORY:   Social History     Tobacco Use    Smoking status: Never    Smokeless tobacco: Not on file   Substance Use Topics    Alcohol use: No       PROBLEM LIST:   Patient Active Problem List    Diagnosis Date Noted    Class 2 severe obesity due to excess calories with serious comorbidity in adult (H) 09/16/2024     Priority: Medium    Kidney replaced by transplant 02/02/2024     Priority: Medium    Aftercare following organ transplant 02/02/2024     Priority: Medium    Chronic kidney disease, stage 2 (mild) 02/02/2024     Priority: Medium    Need for pneumocystis prophylaxis 02/02/2024     Priority: Medium    Immunosuppressed status (H24) 02/02/2024     Priority: Medium    Secondary renal hyperparathyroidism (H24) 02/02/2024     Priority: Medium    Vitamin D deficiency 02/02/2024     Priority: Medium    Hypomagnesemia 02/02/2024     Priority: Medium    Obesity 02/02/2024     Priority: Medium    Non-proliferative diabetic retinopathy, both eyes (H) 09/16/2015     Priority: Medium    Pseudophakia, right eye 09/16/2015     Priority: Medium    Cataract, left 09/16/2015     Priority: Medium    TYPE 2 DIABETES, HBA1C GOAL < 7% 10/31/2010     Priority: Medium    CARDIOVASCULAR SCREENING; LDL GOAL LESS THAN 100 10/31/2010     Priority: Medium    Diabetes mellitus, type 2 (H) 04/22/2004     Priority: Medium     Problem list name updated by automated process. Provider to review      Nonspecific reaction to tuberculin skin test without active tuberculosis 04/22/2004     Priority: Medium     Problem list name updated by automated  process. Provider to review and confirm  Problem list name updated by automated process. Provider to review      Anal fissure 04/22/2004     Priority: Medium    HTN, kidney transplant related 04/22/2004     Priority: Medium     Problem list name updated by automated process. Provider to review         MEDICATIONS:   Prescription Medications as of 9/17/2024         Rx Number Disp Refills Start End Last Dispensed Date Next Fill Date Owning Pharmacy    acetaminophen (TYLENOL) 500 MG tablet  -- -- 3/21/2024 --       Sig: Take 500-1,000 mg by mouth every 8 hours as needed.    Class: Historical    Route: Oral    amLODIPine (NORVASC) 10 MG tablet  -- -- 6/6/2023 --       Sig: Take 1 tablet by mouth daily    Class: Historical    Route: Oral    ASPIRIN 81 MG OR TABS  100 3 3/3/2008 --   [id number] - [place], [place] - [address] [facility] [address]    Sig: ONE DAILY    Class: Fax    Route: Oral    atorvastatin (LIPITOR) 40 MG tablet  -- -- 6/6/2023 --       Sig: Take 1 tablet by mouth daily    Class: Historical    Route: Oral    blood glucose monitoring (NO BRAND SPECIFIED) meter device kit  1 kit 0 6/20/2023 --   [id number] - [place], [place] - [address]    Sig: Use to test blood sugar 3 times daily or as directed.    Class: E-Prescribe    Calcium Carb-Cholecalciferol (CALCIUM 500 + D) 500-3.125 MG-MCG TABS  -- -- 8/1/2023 --       Sig: Take 1 tablet by mouth daily    Class: Historical    Route: Oral    COMPRESSION STOCKINGS  2 each 4 2/13/2024 --       Sig: Please measure and distribute 2 pair of 20mmHg - 30mmHg knee high open or closed toe compression stockings with extra refills as indicated or what insurance will allow .    Class: Local Print    Continuous Glucose Sensor (FREESTYLE BLAKE 2 SENSOR) [facility]  -- -- 5/6/2024 --       Class: Historical    insulin aspart (NOVOLOG FLEXPEN) 100 UNIT/ML pen  -- --  --       Sig: Inject 13 Units Subcutaneous 3 times daily  (with meals)    Class: Historical    Route: Subcutaneous    insulin glargine (LANTUS PEN) 100 UNIT/ML pen  -- --  --       Sig: Inject 30 Units Subcutaneous at bedtime    Class: Historical    Route: Subcutaneous    Lancets MISC  -- -- 5/7/2024 --       Class: Historical    magnesium oxide (MAG-OX) 400 MG tablet  90 tablet 3 9/26/2023 --   [id number] - [place], [place] - [address]    Sig: Take 1 tablet (400 mg) by mouth daily Take at 12 PM    Class: E-Prescribe    Route: Oral    metoprolol succinate ER (TOPROL XL) 50 MG 24 hr tablet  -- --  --       Sig: Take 50 mg by mouth daily    Class: Historical    Route: Oral    mycophenolic acid (GENERIC EQUIVALENT) 180 MG EC tablet  60 tablet 11 12/28/2023 --   [address]    Sig: Take 1 tablet (180 mg) by mouth 2 times daily Total dose = 540 mg twice per day    Class: E-Prescribe    Notes to Pharmacy: TXP DT 2/1/2023 (Kidney) TXP Dischg DT  DX Kidney replaced by transplant Z94.0 [facility]    Route: Oral    mycophenolic acid (GENERIC EQUIVALENT) 360 MG EC tablet  60 tablet 11 12/28/2023 --   [address]    Sig: Take 1 tablet (360 mg) by mouth 2 times daily Total dose = 540 mg twice per day    Class: E-Prescribe    Notes to Pharmacy: TXP DT 2/1/2023 (Kidney) TXP Dischg DT  DX Kidney replaced by transplant Z94.0 [facility] Trans[facility]    Route: Oral    predniSONE (DELTASONE) 5 MG tablet  90 tablet 3 7/3/2023 --   [id number] [place], [place] - [address]    Sig: Take 1 tablet (5 mg) by mouth daily    Class: E-Prescribe    Route: Oral    tacrolimus (GENERIC) 0.5 MG capsule  180 capsule 3 2/5/2024 --   [id number] [place], [place] - [address]    Sig: Take 1 capsule (0.5 mg) by mouth 2 times daily Total dose = 1.5 mg every 12 hours    Class: E-Prescribe    Notes to Pharmacy: TXP DT 2/1/2023 (Kidney) TXP Dischg DT  DX Kidney  replaced by transplant Z94.0 [facility] Foreign Transplant    Route: Oral    tacrolimus (GENERIC) 1 MG capsule  180 capsule 3 2/5/2024 --   [id number] - [place], [place] - [address]    Sig: Take 1 capsule (1 mg) by mouth 2 times daily Total dose = 1.5 mg every 12 hours    Class: E-Prescribe    Notes to Pharmacy: TXP DT 2/1/2023 (Kidney) TXP Dischg DT  DX Kidney replaced by transplant Z94.0 [facility]    Route: Oral    tamsulosin (FLOMAX) 0.4 MG capsule  -- -- 5/7/2024 --       Sig: Take 0.4 mg by mouth    Class: Historical    Route: Oral    torsemide (DEMADEX) 10 MG tablet  90 tablet 1 2/1/2024 --   [facility] - [place], [place] - [address]    Sig: Take 1 tablet (10 mg) by mouth daily    Class: E-Prescribe    Route: Oral            ALLERGIES:   Gramineae pollens and Seasonal allergies    ROS:  Negative unless otherwise stated in HPI.      Physical Examination:   VITALS:   There were no vitals taken for this visit.    Gen: alert and oriented  HEENT: normocephalic and atraumatic  Resp: equal chest rise  Extremities: Mild swelling at the level of the ankles. Wound to left great toe. Photos added to media tab.     Labs:    BMP RESULTS:  Lab Results   Component Value Date     09/16/2024     05/07/2012    POTASSIUM 4.6 09/16/2024    POTASSIUM 4.4 08/06/2021    POTASSIUM 5.2 05/07/2012    CHLORIDE 102 09/16/2024    CHLORIDE 104 08/06/2021    CHLORIDE 105 05/07/2012    CO2 23 09/16/2024    CO2 17 (L) 08/06/2021    CO2 23 05/07/2012    ANIONGAP 11 09/16/2024    ANIONGAP 11 08/06/2021    ANIONGAP 11 05/07/2012     (H) 09/16/2024     (H) 06/14/2023     (H) 08/06/2021     (H) 05/07/2012    BUN 19.4 09/16/2024    BUN 77 (H) 08/06/2021    BUN 49 (H) 05/07/2012    CR 1.18 (H) 09/16/2024    CR 3.74 (H) 05/07/2012    GFRESTIMATED 64 09/16/2024    GFRESTIMATED 16 (L) 05/07/2012    GFRESTBLACK 20 (L) 05/07/2012    BOB 9.4 09/16/2024    BOB 9.2  "05/07/2012        CBC RESULTS:  Lab Results   Component Value Date    WBC 11.4 (H) 09/16/2024    WBC 7.5 05/07/2012    RBC 4.67 09/16/2024    RBC 4.47 05/07/2012    HGB 12.9 (L) 09/16/2024    HGB 12.5 (L) 05/07/2012    HCT 40.7 09/16/2024    HCT 37.1 (L) 05/07/2012    MCV 87 09/16/2024    MCV 83 05/07/2012    MCH 27.6 09/16/2024    MCH 28.0 05/07/2012    MCHC 31.7 09/16/2024    MCHC 33.7 05/07/2012    RDW 14.3 09/16/2024    RDW 14.7 05/07/2012     09/16/2024     05/07/2012       INR/PTT:  No results found for: \"INR\", \"PTT\"    Diagnostic studies: 8/20/2024  TCO2 with marginal would healing capability in the left foot    ESTEBAN 0.99 on the right (previously non compressible) and non compressible on the left (unchanged)     Assessment   Jass is a 73 year old male with a history of PAD and waxing and waning wounds. Today he has a new left great toe wound which classifies him as debbie 5 for chronic limb ischemia. In the past he has elected for conservative management and has had good results with wound healing. Today he presents for ischemic ulcer to the left great toe. The process of lower extremity angiogram was described as well as the risks and benefits including limb salvage, bleeding, infection, and risk of contrast load to his renal transplant. The patient and family demonstrated understanding and wish to proceed with ultrasound evaluation and diagnostic angiogram with possible intervention.       Plan   - Continue with podiatry and wound clinic  - Arterial ultrasound of the left lower extremity  - New compression stockings  - Probable left lower extremity angiogram   - If angiogram is scheduled will need plavix (300 mg) load 5 days prior to procedure followed by 75 daily, do not hold for angiogram.    I interviewed and examined the patient with the resident and agree with the assessment and plan. Debbie chronic ischemia category 5 with minor tissue loss/ulceration, no signs of infection, " no rest pain.    Zaira Bowman MD    CC  Patient Care Team:  Micheal Zimmer MD as PCP - General (Internal Medicine)  José Miguel Camarillo MD as MD (Ophthalmology)  Micheal Zimmer MD (Internal Medicine)  Kendal Vazquez MD as MD (Ophthalmology)  Sarthak Nunez Chi, OD as MD (Optometry)  Sarah Solano MD as MD (Ophthalmology)  Elizabeth Hope MD as MD (Nephrology)  Zaid Jacob MD as Assigned Nephrology Provider  Agapito Ramírez [facility] as Pharmacist (Pharmacist)  Agapito Ramírez [facility] as Assigned MTM Pharmacist  Ceferino Parikh OD as MD (Optometry)  Gurmeet Paiz DPM as Assigned Musculoskeletal Provider  ZAIRA BOWMAN      ----- Service Performed and Documented by Resident or Fellow ------        Atherosclerosis of native artery of both lower extremities with intermittent claudication (H24)    - US Lower Extremity Arterial Duplex Left; Future     Review of external notes as documented above   Independent interpretation of a test performed by another physician/other qualified health care professional (not separately reported) - ESTEBAN and TCO2 8/20/2024                       Lopez Nur MD

## 2024-10-01 ENCOUNTER — TELEPHONE (OUTPATIENT)
Dept: VASCULAR SURGERY | Facility: CLINIC | Age: 74
End: 2024-10-01
Payer: COMMERCIAL

## 2024-10-01 NOTE — TELEPHONE ENCOUNTER
Called to schedule ultrasound for US Lower Extremity Arterial Duplex Left. Unfortunately, there was no Consent to Communicate form on file to speak to the patient's son, Emir. I will mail them the form to fill out.     They also use [employer] a good deal, so I'll send them the scheduling instructions so they can call back together to schedule the imaging appointment.   horacio

## 2024-10-11 ENCOUNTER — ANCILLARY PROCEDURE (OUTPATIENT)
Dept: ULTRASOUND IMAGING | Facility: CLINIC | Age: 74
End: 2024-10-11
Attending: RADIOLOGY
Payer: COMMERCIAL

## 2024-10-11 DIAGNOSIS — I70.213 ATHEROSCLEROSIS OF NATIVE ARTERY OF BOTH LOWER EXTREMITIES WITH INTERMITTENT CLAUDICATION (H): ICD-10-CM

## 2024-10-11 PROCEDURE — 93926 LOWER EXTREMITY STUDY: CPT | Mod: LT | Performed by: RADIOLOGY

## 2024-10-15 ENCOUNTER — TELEPHONE (OUTPATIENT)
Dept: RADIOLOGY | Facility: CLINIC | Age: 74
End: 2024-10-15

## 2024-10-15 NOTE — TELEPHONE ENCOUNTER
[facility] Call Center    Phone Message    May a detailed message be left on voicemail: yes     Reason for Call: Other: Patients son called to get his father scheduled for a follow up appointment to go over imaging that was done. Please call patient back to discuss. Thanks      Action Taken: te sent    Travel Screening: Not Applicable     Date of Service:

## 2024-10-17 NOTE — TELEPHONE ENCOUNTER
The pt is scheduled on 11/5/24 at the [facility] with Perez Amador on 10/17/2024 at 10:42 AM     The pts son was on the phone during scheduling of the above appointments and agreed to the dates times and locations of the appointments.

## 2024-11-05 ENCOUNTER — OFFICE VISIT (OUTPATIENT)
Dept: VASCULAR SURGERY | Facility: CLINIC | Age: 74
End: 2024-11-05
Payer: COMMERCIAL

## 2024-11-05 VITALS — HEART RATE: 67 BPM | OXYGEN SATURATION: 95 % | SYSTOLIC BLOOD PRESSURE: 149 MMHG | DIASTOLIC BLOOD PRESSURE: 77 MMHG

## 2024-11-05 DIAGNOSIS — I70.209 ATHEROSCLEROSIS OF NATIVE ARTERY OF EXTREMITY (H): Primary | ICD-10-CM

## 2024-11-05 DIAGNOSIS — I70.249 ATHEROSCLEROSIS OF NATIVE ARTERIES OF LEFT LEG WITH ULCERATION OF UNSPECIFIED SITE (H): ICD-10-CM

## 2024-11-05 PROCEDURE — 99213 OFFICE O/P EST LOW 20 MIN: CPT | Performed by: RADIOLOGY

## 2024-11-05 RX ORDER — CLOPIDOGREL BISULFATE 75 MG/1
TABLET ORAL
Qty: 30 TABLET | Refills: 2 | Status: SHIPPED | OUTPATIENT
Start: 2024-11-05

## 2024-11-05 RX ORDER — CLOPIDOGREL BISULFATE 75 MG/1
75 TABLET ORAL DAILY
Qty: 90 TABLET | Refills: 0 | Status: SHIPPED | OUTPATIENT
Start: 2024-11-05 | End: 2024-11-05

## 2024-11-05 RX ORDER — CLOPIDOGREL 300 MG/1
TABLET, FILM COATED ORAL
Qty: 1 TABLET | Refills: 0 | Status: SHIPPED | OUTPATIENT
Start: 2024-11-05

## 2024-11-05 RX ORDER — CLOPIDOGREL 300 MG/1
TABLET, FILM COATED ORAL
Qty: 1 TABLET | Refills: 0 | Status: SHIPPED | OUTPATIENT
Start: 2024-11-05 | End: 2024-11-05

## 2024-11-05 NOTE — PATIENT INSTRUCTIONS
Sri you have had your follow up visit today with Dr Urbina for your PAD.     Plan    We will contact you to schedule your procedure.      We have given your prescription for Plavix to start 3 days prior to procedure.      Thanks,     AFelicita Dudley RN, BSN  Interventional Radiology Care Coordinator   Phone:  [phone]

## 2024-11-05 NOTE — LETTER
11/5/2024       RE: Jass Fierro  [address]  [facility] [address]     Dear Colleague,    Thank you for referring your patient, Jass Fierro, to the [facility] [place] [facility]. Please see a copy of my visit note below.        INTERVENTIONAL RADIOLOGY ESTABLISHED PATIENT FOLLOW UP      HPI:   73 year old with peripheral arterial disease, diabetes, s/p kidney transplant - with a left 1st toe ulcer - non-healing dry wound.    Today he is here for follow-up after receiving updated duplex ultrasound of the left leg. The wound is stable - there is dry gangrene without drainage. Mild pain at the toe.    ROS:  Negative unless otherwise stated in HPI.      Physical Examination:   VITALS:   There were no vitals taken for this visit.      Labs:    BMP RESULTS:  Lab Results   Component Value Date     09/16/2024     05/07/2012    POTASSIUM 4.6 09/16/2024    POTASSIUM 4.4 08/06/2021    POTASSIUM 5.2 05/07/2012    CHLORIDE 102 09/16/2024    CHLORIDE 104 08/06/2021    CHLORIDE 105 05/07/2012    CO2 23 09/16/2024    CO2 17 (L) 08/06/2021    CO2 23 05/07/2012    ANIONGAP 11 09/16/2024    ANIONGAP 11 08/06/2021    ANIONGAP 11 05/07/2012     (H) 09/16/2024     (H) 06/14/2023     (H) 08/06/2021     (H) 05/07/2012    BUN 19.4 09/16/2024    BUN 77 (H) 08/06/2021    BUN 49 (H) 05/07/2012    CR 1.18 (H) 09/16/2024    CR 3.74 (H) 05/07/2012    GFRESTIMATED 64 09/16/2024    GFRESTIMATED 16 (L) 05/07/2012    GFRESTBLACK 20 (L) 05/07/2012    BOB 9.4 09/16/2024    BOB 9.2 05/07/2012        CBC RESULTS:  Lab Results   Component Value Date    WBC 11.4 (H) 09/16/2024    WBC 7.5 05/07/2012    RBC 4.67 09/16/2024    RBC 4.47 05/07/2012    HGB 12.9 (L) 09/16/2024    HGB 12.5 (L) 05/07/2012    HCT 40.7 09/16/2024    HCT 37.1 (L) 05/07/2012    MCV 87 09/16/2024    MCV 83 05/07/2012    MCH 27.6 09/16/2024     "[facility] 28.0 05/07/2012    [facility] 31.7 09/16/2024    [facility] 33.7 05/07/2012    RDW 14.3 09/16/2024    RDW 14.7 05/07/2012     09/16/2024     05/07/2012       INR/PTT:  No results found for: \"INR\", \"PTT\"    Diagnostic studies:   Left leg arterial duplex 10/11/24:  IMPRESSION: LEFT:  1. Popliteal artery 58% diameter stenosis suggested by color Doppler.  351 cm/s velocity and 6.38 velocity ratio suggest an even worse  stenosis.     2. Dorsalis pedis artery stenosis. 5.17 velocity ratio.    TcO2 demonstrates:     Marginal impairment of wound healing capability in the  left foot.     1. RIGHT:       A. Resting ESTEBAN is borderline, 0.99, previously non compressible.       B. Resting TBI is ABNORMAL, 0.34, previously 0.29.     2. LEFT:       A. Resting ESTEBAN is non compressible, unchanged.       B. Resting TBI is ABNORMAL, 0.19, unchanged.       Assessment   73 year old with peripheral arterial disease and recurrent non-healing wound involving his left great toe with imaging suggestive of stenosis in the popliteal artery as well as tibial arteries in the lower leg.  [place] category 5 with dry gangrene. Mild pain currently. I recommended proceeding with left lower extremity angiogram with probable intervention.    Will initiate Plavix 4-5 days prior with loading dose and plan for likely 3 month course of treatment.      Plan   Schedule patient for left lower extremity angiogram with intervention.    CC  Patient Care Team:  Micheal Zimmer MD as PCP - General (Internal Medicine)  José Miguel Camarillo MD as MD (Ophthalmology)  Micheal Zimmer MD (Internal Medicine)  Kendal Vazquez MD as MD (Ophthalmology)  Sarthak Nunez Chi, OD as MD (Optometry)  Sarah Solano MD as MD (Ophthalmology)  Elizabeth Hope MD as MD (Nephrology)  Zaid Jacob MD as Assigned Nephrology Provider  Agapito Ramírez [facility] as Pharmacist (Pharmacist)  Agapito Ramírez RP as Assigned MTM Pharmacist  Ceferino Parikh, " OD as MD (Optometry)  Gurmeet Paiz DPM as Assigned Musculoskeletal Provider  JOAO BOWMAN        Again, thank you for allowing me to participate in the care of your patient.      Sincerely,    Joao Bowman MD

## 2024-11-05 NOTE — PROGRESS NOTES
"    INTERVENTIONAL RADIOLOGY ESTABLISHED PATIENT FOLLOW UP      HPI:   73 year old with peripheral arterial disease, diabetes, s/p kidney transplant - with a left 1st toe ulcer - non-healing dry wound.    Today he is here for follow-up after receiving updated duplex ultrasound of the left leg. The wound is stable - there is dry gangrene without drainage. Mild pain at the toe.    ROS:  Negative unless otherwise stated in HPI.      Physical Examination:   VITALS:   There were no vitals taken for this visit.      Labs:    BMP RESULTS:  Lab Results   Component Value Date     09/16/2024     05/07/2012    POTASSIUM 4.6 09/16/2024    POTASSIUM 4.4 08/06/2021    POTASSIUM 5.2 05/07/2012    CHLORIDE 102 09/16/2024    CHLORIDE 104 08/06/2021    CHLORIDE 105 05/07/2012    CO2 23 09/16/2024    CO2 17 (L) 08/06/2021    CO2 23 05/07/2012    ANIONGAP 11 09/16/2024    ANIONGAP 11 08/06/2021    ANIONGAP 11 05/07/2012     (H) 09/16/2024     (H) 06/14/2023     (H) 08/06/2021     (H) 05/07/2012    BUN 19.4 09/16/2024    BUN 77 (H) 08/06/2021    BUN 49 (H) 05/07/2012    CR 1.18 (H) 09/16/2024    CR 3.74 (H) 05/07/2012    GFRESTIMATED 64 09/16/2024    GFRESTIMATED 16 (L) 05/07/2012    GFRESTBLACK 20 (L) 05/07/2012    BOB 9.4 09/16/2024    BOB 9.2 05/07/2012        CBC RESULTS:  Lab Results   Component Value Date    WBC 11.4 (H) 09/16/2024    WBC 7.5 05/07/2012    RBC 4.67 09/16/2024    RBC 4.47 05/07/2012    HGB 12.9 (L) 09/16/2024    HGB 12.5 (L) 05/07/2012    HCT 40.7 09/16/2024    HCT 37.1 (L) 05/07/2012    MCV 87 09/16/2024    MCV 83 05/07/2012    MCH 27.6 09/16/2024    MCH 28.0 05/07/2012    MCHC 31.7 09/16/2024    MCHC 33.7 05/07/2012    RDW 14.3 09/16/2024    RDW 14.7 05/07/2012     09/16/2024     05/07/2012       INR/PTT:  No results found for: \"INR\", \"PTT\"    Diagnostic studies:   Left leg arterial duplex 10/11/24:  IMPRESSION: LEFT:  1. Popliteal artery 58% diameter stenosis " suggested by color Doppler.  351 cm/s velocity and 6.38 velocity ratio suggest an even worse  stenosis.     2. Dorsalis pedis artery stenosis. 5.17 velocity ratio.    TcO2 demonstrates:     Marginal impairment of wound healing capability in the  left foot.     1. RIGHT:       A. Resting ESTEBAN is borderline, 0.99, previously non compressible.       B. Resting TBI is ABNORMAL, 0.34, previously 0.29.     2. LEFT:       A. Resting ESTEBAN is non compressible, unchanged.       B. Resting TBI is ABNORMAL, 0.19, unchanged.       Assessment   73 year old with peripheral arterial disease and recurrent non-healing wound involving his left great toe with imaging suggestive of stenosis in the popliteal artery as well as tibial arteries in the lower leg.  [place] category 5 with dry gangrene. Mild pain currently. I recommended proceeding with left lower extremity angiogram with probable intervention.    Will initiate Plavix 4-5 days prior with loading dose and plan for likely 3 month course of treatment.      Plan   Schedule patient for left lower extremity angiogram with intervention.    CC  Patient Care Team:  Micheal Zimmer MD as PCP - General (Internal Medicine)  José Miguel Camarillo MD as MD (Ophthalmology)  Micheal Zimmer MD (Internal Medicine)  Kendal Vazquez MD as MD (Ophthalmology)  Sarthak Nunez Chi, OD as MD (Optometry)  Sarah Solano MD as MD (Ophthalmology)  Elizabeth Hope MD as MD (Nephrology)  Zaid Jacob MD as Assigned Nephrology Provider  Agapito Ramírez RP as Pharmacist (Pharmacist)  Agapito Ramírez RP as Assigned MTM Pharmacist  Ceferino Parikh OD as MD (Optometry)  Gurmeet Paiz DPM as Assigned Musculoskeletal Provider  ZAIRA BOWMAN

## 2024-11-07 ENCOUNTER — HOSPITAL ENCOUNTER (OUTPATIENT)
Facility: CLINIC | Age: 74
End: 2024-11-07
Admitting: RADIOLOGY
Payer: COMMERCIAL

## 2024-11-15 ENCOUNTER — LAB (OUTPATIENT)
Dept: LAB | Facility: CLINIC | Age: 74
End: 2024-11-15
Attending: INTERNAL MEDICINE
Payer: COMMERCIAL

## 2024-11-15 ENCOUNTER — TELEPHONE (OUTPATIENT)
Dept: TRANSPLANT | Facility: CLINIC | Age: 74
End: 2024-11-15

## 2024-11-15 DIAGNOSIS — Z79.899 ENCOUNTER FOR LONG-TERM CURRENT USE OF MEDICATION: ICD-10-CM

## 2024-11-15 DIAGNOSIS — Z48.298 AFTERCARE FOLLOWING ORGAN TRANSPLANT: ICD-10-CM

## 2024-11-15 DIAGNOSIS — Z94.0 KIDNEY REPLACED BY TRANSPLANT: ICD-10-CM

## 2024-11-15 DIAGNOSIS — Z98.890 OTHER SPECIFIED POSTPROCEDURAL STATES: ICD-10-CM

## 2024-11-15 DIAGNOSIS — I15.1 HTN, KIDNEY TRANSPLANT RELATED: ICD-10-CM

## 2024-11-15 DIAGNOSIS — Z94.0 HTN, KIDNEY TRANSPLANT RELATED: ICD-10-CM

## 2024-11-15 LAB
ALBUMIN UR-MCNC: NEGATIVE MG/DL
ANION GAP SERPL CALCULATED.3IONS-SCNC: 9 MMOL/L (ref 7–15)
APPEARANCE UR: CLEAR
BILIRUB UR QL STRIP: NEGATIVE
BK VIRUS SPECIMEN TYPE: NORMAL
BKV DNA # SPEC NAA+PROBE: NOT DETECTED IU/ML
BUN SERPL-MCNC: 14.8 MG/DL (ref 8–23)
CALCIUM SERPL-MCNC: 9.2 MG/DL (ref 8.8–10.4)
CHLORIDE SERPL-SCNC: 105 MMOL/L (ref 98–107)
COLOR UR AUTO: NORMAL
CREAT SERPL-MCNC: 1.05 MG/DL (ref 0.67–1.17)
EGFRCR SERPLBLD CKD-EPI 2021: 74 ML/MIN/1.73M2
ERYTHROCYTE [DISTWIDTH] IN BLOOD BY AUTOMATED COUNT: 13.8 % (ref 10–15)
GLUCOSE SERPL-MCNC: 148 MG/DL (ref 70–99)
GLUCOSE UR STRIP-MCNC: NEGATIVE MG/DL
HCO3 SERPL-SCNC: 25 MMOL/L (ref 22–29)
HCT VFR BLD AUTO: 40.7 % (ref 40–53)
HGB BLD-MCNC: 13.2 G/DL (ref 13.3–17.7)
HGB UR QL STRIP: NEGATIVE
KETONES UR STRIP-MCNC: NEGATIVE MG/DL
LEUKOCYTE ESTERASE UR QL STRIP: NEGATIVE
MCH RBC QN AUTO: 28.2 PG (ref 26.5–33)
MCHC RBC AUTO-ENTMCNC: 32.4 G/DL (ref 31.5–36.5)
MCV RBC AUTO: 87 FL (ref 78–100)
NITRATE UR QL: NEGATIVE
PH UR STRIP: 6.5 [PH] (ref 5–7)
PLATELET # BLD AUTO: 203 10E3/UL (ref 150–450)
POTASSIUM SERPL-SCNC: 4.2 MMOL/L (ref 3.4–5.3)
RBC # BLD AUTO: 4.68 10E6/UL (ref 4.4–5.9)
RBC URINE: <1 /HPF
SODIUM SERPL-SCNC: 139 MMOL/L (ref 135–145)
SP GR UR STRIP: 1.01 (ref 1–1.03)
TACROLIMUS BLD-MCNC: 7.9 UG/L (ref 5–15)
TME LAST DOSE: NORMAL H
TME LAST DOSE: NORMAL H
UROBILINOGEN UR STRIP-MCNC: NORMAL MG/DL
WBC # BLD AUTO: 10.2 10E3/UL (ref 4–11)
WBC URINE: 0 /HPF

## 2024-11-15 PROCEDURE — 99000 SPECIMEN HANDLING OFFICE-LAB: CPT | Performed by: PATHOLOGY

## 2024-11-15 PROCEDURE — 80048 BASIC METABOLIC PNL TOTAL CA: CPT | Performed by: PATHOLOGY

## 2024-11-15 PROCEDURE — 36415 COLL VENOUS BLD VENIPUNCTURE: CPT | Performed by: PATHOLOGY

## 2024-11-15 PROCEDURE — 80197 ASSAY OF TACROLIMUS: CPT | Performed by: INTERNAL MEDICINE

## 2024-11-15 PROCEDURE — 85027 COMPLETE CBC AUTOMATED: CPT | Performed by: PATHOLOGY

## 2024-11-15 PROCEDURE — 81001 URINALYSIS AUTO W/SCOPE: CPT | Performed by: PATHOLOGY

## 2024-11-15 PROCEDURE — 87799 DETECT AGENT NOS DNA QUANT: CPT | Performed by: INTERNAL MEDICINE

## 2024-11-15 NOTE — TELEPHONE ENCOUNTER
ISSUE:   Tacrolimus IR level 7.9 on 11/15, goal 4-6, dose 1.5 mg BID.    PLAN:   Call Patient and confirm this was an accurate 12-hour trough.   Verify Tacrolimus IR dose 1.5 mg BID.   Confirm no new medications or or missed doses.   Confirm no new illness / infection / diarrhea.   If accurate trough and accurate dose, decrease Tacrolimus IR dose to 1 mg BID     Is this more than a 50% increase or decrease in current IS dose: No  If YES, justification: na    Repeat labs in 1-2 weeks.  *If > 50% change in immunosuppression dose, repeat labs in 1 week.     OUTCOME: Repair Reportt message sent to patient, LVM as well

## 2024-11-18 RX ORDER — LIDOCAINE 40 MG/G
CREAM TOPICAL
Status: CANCELLED | OUTPATIENT
Start: 2024-11-18

## 2024-11-26 NOTE — TELEPHONE ENCOUNTER
I called and left a voicemail including my call back number.  I left vm that I am mailing a procedure letter for Angio procedure.   Thanks,     AFelicita Dudley RN, BSN  Interventional Radiology Care Coordinator   Phone:  [phone]

## 2024-12-05 ENCOUNTER — APPOINTMENT (OUTPATIENT)
Dept: MEDSURG UNIT | Facility: CLINIC | Age: 74
End: 2024-12-05
Payer: COMMERCIAL

## 2024-12-12 ENCOUNTER — TELEPHONE (OUTPATIENT)
Dept: TRANSPLANT | Facility: CLINIC | Age: 74
End: 2024-12-12
Payer: COMMERCIAL

## 2024-12-12 DIAGNOSIS — Z94.0 HTN, KIDNEY TRANSPLANT RELATED: ICD-10-CM

## 2024-12-12 DIAGNOSIS — Z48.298 AFTERCARE FOLLOWING ORGAN TRANSPLANT: ICD-10-CM

## 2024-12-12 DIAGNOSIS — I15.1 HTN, KIDNEY TRANSPLANT RELATED: ICD-10-CM

## 2024-12-12 RX ORDER — PREDNISONE 5 MG/1
5 TABLET ORAL DAILY
Qty: 90 TABLET | Refills: 3 | Status: SHIPPED | OUTPATIENT
Start: 2024-12-12

## 2024-12-12 NOTE — TELEPHONE ENCOUNTER
"Left Voicemail (1st Attempt) for the patient to call back and schedule the following:    Appointment type: RKT  Provider: Dr. Tera Christie   Return date: 4/10/24  Specialty phone number: [phone]  Additional appointment(s) needed: labs before MD   Additonal Notes: Writer left pt's son \"Emir\" a v/m to confirm r/s from 4/11/25 w/Dr Tera Christie & lab visit, to 4/10/25 w/Dr. Tera Christie at 12:30p, labs at 11:30a, will f/u to confirm.     AL(Noor)  12/12/24  "

## 2024-12-12 NOTE — TELEPHONE ENCOUNTER
Patient Call: General  Route to LPN    Reason for call: Pharmacy called for refill    predniSONE (DELTASONE) 5 MG tablet     Please send IMMEDIATELY, pharmacy states he has been out for some time and they have been in contact.    [facility] - [place], [place] - [id number] Phone: [phone]   Fax: [phone]            Call back needed? No

## 2024-12-23 ENCOUNTER — TELEPHONE (OUTPATIENT)
Dept: TRANSPLANT | Facility: CLINIC | Age: 74
End: 2024-12-23
Payer: COMMERCIAL

## 2024-12-23 DIAGNOSIS — I15.1 HTN, KIDNEY TRANSPLANT RELATED: ICD-10-CM

## 2024-12-23 DIAGNOSIS — Z94.0 HTN, KIDNEY TRANSPLANT RELATED: ICD-10-CM

## 2024-12-23 DIAGNOSIS — Z94.0 KIDNEY REPLACED BY TRANSPLANT: ICD-10-CM

## 2024-12-23 DIAGNOSIS — Z48.298 AFTERCARE FOLLOWING ORGAN TRANSPLANT: ICD-10-CM

## 2024-12-23 DIAGNOSIS — Z79.899 ENCOUNTER FOR LONG-TERM CURRENT USE OF MEDICATION: ICD-10-CM

## 2024-12-23 DIAGNOSIS — Z98.890 OTHER SPECIFIED POSTPROCEDURAL STATES: ICD-10-CM

## 2024-12-23 RX ORDER — TACROLIMUS 0.5 MG/1
0.5 CAPSULE ORAL 2 TIMES DAILY
Qty: 180 CAPSULE | Refills: 3 | Status: SHIPPED | OUTPATIENT
Start: 2024-12-23

## 2024-12-23 RX ORDER — MYCOPHENOLIC ACID 360 MG/1
360 TABLET, DELAYED RELEASE ORAL 2 TIMES DAILY
Qty: 180 TABLET | Refills: 3 | Status: SHIPPED | OUTPATIENT
Start: 2024-12-23

## 2024-12-23 NOTE — TELEPHONE ENCOUNTER
Provider Call: Medication Refill  Route to LPN  Pharmacy Name: [facility]  Pharmacy Location: [facility]  Name of Medication: Mycophenolate 360 mg- TMP sulfa- Tacro 0.5 mg  90 day supply with refills   When will the patient be out of this medication?: Less than 24 hours (Zulma GARCIA, then page if no answer)  Callback needed? No

## 2024-12-29 ENCOUNTER — HEALTH MAINTENANCE LETTER (OUTPATIENT)
Age: 74
End: 2024-12-29

## 2024-12-31 ENCOUNTER — LAB REQUISITION (OUTPATIENT)
Dept: LAB | Facility: CLINIC | Age: 74
End: 2024-12-31
Payer: COMMERCIAL

## 2024-12-31 DIAGNOSIS — M79.672 PAIN IN LEFT FOOT: ICD-10-CM

## 2024-12-31 LAB — URATE SERPL-MCNC: 5.2 MG/DL (ref 3.4–7)

## 2025-01-27 ENCOUNTER — TELEPHONE (OUTPATIENT)
Dept: TRANSPLANT | Facility: CLINIC | Age: 75
End: 2025-01-27
Payer: COMMERCIAL

## 2025-01-27 DIAGNOSIS — Z48.298 AFTERCARE FOLLOWING ORGAN TRANSPLANT: Primary | ICD-10-CM

## 2025-01-27 DIAGNOSIS — I15.1 HTN, KIDNEY TRANSPLANT RELATED: ICD-10-CM

## 2025-01-27 DIAGNOSIS — Z94.0 HTN, KIDNEY TRANSPLANT RELATED: ICD-10-CM

## 2025-01-27 ASSESSMENT — ENCOUNTER SYMPTOMS: NEW SYMPTOMS OF CORONARY ARTERY DISEASE: 0

## 2025-01-27 NOTE — TELEPHONE ENCOUNTER
Patient status updated  Lab orders updated  Patient sees Dr. Tera Christie next in April  [facility] for patient to discuss non-compliance with labs.

## 2025-03-01 ENCOUNTER — HEALTH MAINTENANCE LETTER (OUTPATIENT)
Age: 75
End: 2025-03-01

## 2025-03-18 ENCOUNTER — APPOINTMENT (OUTPATIENT)
Dept: CT IMAGING | Facility: CLINIC | Age: 75
End: 2025-03-18
Payer: COMMERCIAL

## 2025-03-18 ENCOUNTER — HOSPITAL ENCOUNTER (EMERGENCY)
Facility: CLINIC | Age: 75
Discharge: HOME | End: 2025-03-18
Attending: FAMILY MEDICINE | Admitting: FAMILY MEDICINE
Payer: COMMERCIAL

## 2025-03-18 VITALS
WEIGHT: 221.7 LBS | RESPIRATION RATE: 16 BRPM | HEART RATE: 76 BPM | TEMPERATURE: 98.5 F | SYSTOLIC BLOOD PRESSURE: 162 MMHG | OXYGEN SATURATION: 100 % | DIASTOLIC BLOOD PRESSURE: 86 MMHG | BODY MASS INDEX: 34.38 KG/M2

## 2025-03-18 DIAGNOSIS — R10.9 ABDOMINAL PAIN: ICD-10-CM

## 2025-03-18 LAB
ALBUMIN SERPL BCG-MCNC: 4.2 G/DL (ref 3.5–5.2)
ALBUMIN UR-MCNC: NEGATIVE MG/DL
ALP SERPL-CCNC: 108 U/L (ref 40–150)
ALT SERPL W P-5'-P-CCNC: 45 U/L (ref 0–70)
ANION GAP SERPL CALCULATED.3IONS-SCNC: 12 MMOL/L (ref 7–15)
APPEARANCE UR: CLEAR
AST SERPL W P-5'-P-CCNC: 28 U/L (ref 0–45)
BASOPHILS # BLD AUTO: 0 10E3/UL (ref 0–0.2)
BASOPHILS NFR BLD AUTO: 0 %
BILIRUB SERPL-MCNC: 0.5 MG/DL
BILIRUB UR QL STRIP: NEGATIVE
BUN SERPL-MCNC: 16.6 MG/DL (ref 8–23)
CALCIUM SERPL-MCNC: 9.4 MG/DL (ref 8.8–10.4)
CHLORIDE SERPL-SCNC: 101 MMOL/L (ref 98–107)
COLOR UR AUTO: ABNORMAL
CREAT SERPL-MCNC: 1 MG/DL (ref 0.67–1.17)
EGFRCR SERPLBLD CKD-EPI 2021: 78 ML/MIN/1.73M2
EOSINOPHIL # BLD AUTO: 0.1 10E3/UL (ref 0–0.7)
EOSINOPHIL NFR BLD AUTO: 1 %
ERYTHROCYTE [DISTWIDTH] IN BLOOD BY AUTOMATED COUNT: 13.9 % (ref 10–15)
GLUCOSE SERPL-MCNC: 233 MG/DL (ref 70–99)
GLUCOSE UR STRIP-MCNC: 70 MG/DL
HCO3 SERPL-SCNC: 24 MMOL/L (ref 22–29)
HCT VFR BLD AUTO: 42 % (ref 40–53)
HGB BLD-MCNC: 13.7 G/DL (ref 13.3–17.7)
HGB UR QL STRIP: NEGATIVE
IMM GRANULOCYTES # BLD: 0 10E3/UL
IMM GRANULOCYTES NFR BLD: 0 %
KETONES UR STRIP-MCNC: NEGATIVE MG/DL
LEUKOCYTE ESTERASE UR QL STRIP: NEGATIVE
LIPASE SERPL-CCNC: 34 U/L (ref 13–60)
LYMPHOCYTES # BLD AUTO: 1.5 10E3/UL (ref 0.8–5.3)
LYMPHOCYTES NFR BLD AUTO: 13 %
MCH RBC QN AUTO: 28.1 PG (ref 26.5–33)
MCHC RBC AUTO-ENTMCNC: 32.6 G/DL (ref 31.5–36.5)
MCV RBC AUTO: 86 FL (ref 78–100)
MONOCYTES # BLD AUTO: 0.5 10E3/UL (ref 0–1.3)
MONOCYTES NFR BLD AUTO: 5 %
NEUTROPHILS # BLD AUTO: 9.2 10E3/UL (ref 1.6–8.3)
NEUTROPHILS NFR BLD AUTO: 81 %
NITRATE UR QL: NEGATIVE
NRBC # BLD AUTO: 0 10E3/UL
NRBC BLD AUTO-RTO: 0 /100
PH UR STRIP: 6.5 [PH] (ref 5–7)
PLATELET # BLD AUTO: 235 10E3/UL (ref 150–450)
POTASSIUM SERPL-SCNC: 4.3 MMOL/L (ref 3.4–5.3)
PROT SERPL-MCNC: 7.4 G/DL (ref 6.4–8.3)
RBC # BLD AUTO: 4.87 10E6/UL (ref 4.4–5.9)
RBC URINE: 1 /HPF
SODIUM SERPL-SCNC: 137 MMOL/L (ref 135–145)
SP GR UR STRIP: 1.01 (ref 1–1.03)
UROBILINOGEN UR STRIP-MCNC: NORMAL MG/DL
WBC # BLD AUTO: 11.4 10E3/UL (ref 4–11)
WBC URINE: <1 /HPF

## 2025-03-18 PROCEDURE — 83690 ASSAY OF LIPASE: CPT

## 2025-03-18 PROCEDURE — 74177 CT ABD & PELVIS W/CONTRAST: CPT

## 2025-03-18 PROCEDURE — 96360 HYDRATION IV INFUSION INIT: CPT | Mod: 59 | Performed by: FAMILY MEDICINE

## 2025-03-18 PROCEDURE — 250N000011 HC RX IP 250 OP 636

## 2025-03-18 PROCEDURE — 99285 EMERGENCY DEPT VISIT HI MDM: CPT | Mod: 25 | Performed by: FAMILY MEDICINE

## 2025-03-18 PROCEDURE — 36415 COLL VENOUS BLD VENIPUNCTURE: CPT | Performed by: FAMILY MEDICINE

## 2025-03-18 PROCEDURE — 85041 AUTOMATED RBC COUNT: CPT | Performed by: FAMILY MEDICINE

## 2025-03-18 PROCEDURE — 96361 HYDRATE IV INFUSION ADD-ON: CPT | Performed by: FAMILY MEDICINE

## 2025-03-18 PROCEDURE — 80053 COMPREHEN METABOLIC PANEL: CPT | Performed by: FAMILY MEDICINE

## 2025-03-18 PROCEDURE — 81001 URINALYSIS AUTO W/SCOPE: CPT | Performed by: FAMILY MEDICINE

## 2025-03-18 PROCEDURE — 258N000003 HC RX IP 258 OP 636

## 2025-03-18 PROCEDURE — 85004 AUTOMATED DIFF WBC COUNT: CPT | Performed by: FAMILY MEDICINE

## 2025-03-18 PROCEDURE — 250N000009 HC RX 250

## 2025-03-18 PROCEDURE — 250N000013 HC RX MED GY IP 250 OP 250 PS 637

## 2025-03-18 PROCEDURE — 99284 EMERGENCY DEPT VISIT MOD MDM: CPT | Performed by: FAMILY MEDICINE

## 2025-03-18 RX ORDER — ACETAMINOPHEN 500 MG
1000 TABLET ORAL ONCE
Status: COMPLETED | OUTPATIENT
Start: 2025-03-18 | End: 2025-03-18

## 2025-03-18 RX ORDER — POLYETHYLENE GLYCOL 3350 17 G/17G
1 POWDER, FOR SOLUTION ORAL DAILY
Qty: 578 G | Refills: 0 | Status: SHIPPED | OUTPATIENT
Start: 2025-03-18

## 2025-03-18 RX ORDER — ONDANSETRON 2 MG/ML
4 INJECTION INTRAMUSCULAR; INTRAVENOUS ONCE
Status: DISCONTINUED | OUTPATIENT
Start: 2025-03-18 | End: 2025-03-18 | Stop reason: HOSPADM

## 2025-03-18 RX ORDER — IOPAMIDOL 755 MG/ML
100 INJECTION, SOLUTION INTRAVASCULAR ONCE
Status: COMPLETED | OUTPATIENT
Start: 2025-03-18 | End: 2025-03-18

## 2025-03-18 RX ADMIN — IOPAMIDOL 112 ML: 755 INJECTION, SOLUTION INTRAVENOUS at 14:25

## 2025-03-18 RX ADMIN — SODIUM CHLORIDE 56 ML: 9 INJECTION, SOLUTION INTRAVENOUS at 14:26

## 2025-03-18 RX ADMIN — ACETAMINOPHEN 1000 MG: 500 TABLET ORAL at 13:11

## 2025-03-18 RX ADMIN — SODIUM CHLORIDE 1000 ML: 0.9 INJECTION, SOLUTION INTRAVENOUS at 13:12

## 2025-03-18 ASSESSMENT — ACTIVITIES OF DAILY LIVING (ADL)
ADLS_ACUITY_SCORE: 41

## 2025-03-18 ASSESSMENT — COLUMBIA-SUICIDE SEVERITY RATING SCALE - C-SSRS
6. HAVE YOU EVER DONE ANYTHING, STARTED TO DO ANYTHING, OR PREPARED TO DO ANYTHING TO END YOUR LIFE?: NO
2. HAVE YOU ACTUALLY HAD ANY THOUGHTS OF KILLING YOURSELF IN THE PAST MONTH?: NO
1. IN THE PAST MONTH, HAVE YOU WISHED YOU WERE DEAD OR WISHED YOU COULD GO TO SLEEP AND NOT WAKE UP?: NO

## 2025-03-18 NOTE — ED TRIAGE NOTES
Abdominal pain, burning during urination, left sided flank pain started two days ago  Kidney implant 2 years ago right kidney    Patient is diabetic with home glucose 253  Patient reports constipation, last BM yesterday     Triage Assessment (Adult)       Row Name 03/18/25 1206          Triage Assessment    Airway WDL WDL        Respiratory WDL    Respiratory WDL WDL        Skin Circulation/Temperature WDL    Skin Circulation/Temperature WDL WDL        Cardiac WDL    Cardiac WDL WDL        Peripheral/Neurovascular WDL    Peripheral Neurovascular WDL WDL        Cognitive/Neuro/Behavioral WDL    Cognitive/Neuro/Behavioral WDL WDL

## 2025-03-18 NOTE — ED PROVIDER NOTES
[facility] EMERGENCY DEPARTMENT ([facility])    3/18/25      ED PROVIDER NOTE    History     Chief Complaint   Patient presents with    Abdominal Pain     The history is provided by the patient and a relative. The history is limited by a language barrier. A  was used.     Jass Fierro is a 74 year old male with a history notable for ESRD s/p kidney transplant (2/1/2023), T2DM, and HTN who presents to the ED with abdominal pain, flank pain, and dysuria.  Language line  was utilized throughout the ED visit.  He states that he began having left lower arm periumbilical abdominal pain yesterday that has continued and worsened into today.  He also reports left-sided flank pain that he believes is related to his left-sided abdominal pain as well.  He denies any rashes or recent strenuous activity or injuries that could have caused his pain.  He describes it as a burning sensation in his stomach but denies any radiation of the pain up into his chest or any symptoms of shortness of air.  He states that his abdomen feels distended more than normal as well.  He he continues to pass flatus appropriately.  He states that his last bowel movement was 1 to 2 days ago and was small and hard in nature.  He denies any diarrhea, melena, hematochezia.  He does report some nausea over the past 24 hours but denies any episodes of emesis.  He denies fever, chills, or URI symptoms.  According to the triage note, he has been having some dysuria but upon questioning later, he denies dysuria or hematuria but does report chronic urgency and frequency of urination.  His transplant was done in [facility] in 2023 and is located in the right lower quadrant of his abdomen.  He denies any pain around his transplant site.  He has not tried any medications at home prior to arrival.    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 internal sphincterotomy     acetaminophen (TYLENOL) 500 MG tablet  amLODIPine (NORVASC) 10 MG tablet  ASPIRIN 81 MG OR TABS  atorvastatin (LIPITOR) 40 MG tablet  blood glucose monitoring (NO BRAND SPECIFIED) meter device kit  Calcium Carb-Cholecalciferol (CALCIUM 500 + D) 500-3.125 MG-MCG TABS  clopidogrel (PLAVIX) 300 MG TABS tablet  clopidogrel (PLAVIX) 75 MG tablet  COMPRESSION STOCKINGS  COMPRESSION STOCKINGS  Continuous Glucose Sensor (FREESTYLE BLAKE 2 SENSOR) MISC  insulin aspart (NOVOLOG FLEXPEN) 100 UNIT/ML pen  insulin glargine (LANTUS PEN) 100 UNIT/ML pen  Lancets MISC  magnesium oxide (MAG-OX) 400 MG tablet  metoprolol succinate ER (TOPROL XL) 50 MG 24 hr tablet  mycophenolic acid (GENERIC EQUIVALENT) 180 MG EC tablet  mycophenolic acid (GENERIC EQUIVALENT) 360 MG EC tablet  polyethylene glycol (MIRALAX) 17 GM/Dose powder  predniSONE (DELTASONE) 5 MG tablet  tacrolimus (GENERIC EQUIVALENT) 0.5 MG capsule  tacrolimus (GENERIC) 1 MG capsule  tamsulosin (FLOMAX) 0.4 MG capsule  torsemide (DEMADEX) 10 MG tablet      Allergies   Allergen Reactions    Gramineae Pollens Itching     \"seasonal\"    Seasonal Allergies Itching     Family History  Family History   Problem Relation Age of Onset    Family History Negative " Other     Diabetes Other     Hypertension Other     Diabetes Mother     Diabetes Father     Hypertension Father     Hypertension Brother     Cancer No family hx of     Glaucoma No family hx of      Social History   Social History     Tobacco Use    Smoking status: Never   Substance Use Topics    Alcohol use: No    Drug use: No      Past medical history, past surgical history, medications, allergies, family history, and social history were reviewed with the patient. No additional pertinent items.     A medically appropriate review of systems was performed with pertinent positives and negatives noted in the HPI, and all other systems negative.    Physical Exam   BP: 135/76  Pulse: 77  Temp: 98  F (36.7  C)  Resp: 16  Weight: 100.6 kg (221 lb 11.2 oz)  SpO2: 98 %    Vitals:    03/18/25 1203 03/18/25 1643   BP: 135/76 (!) 162/86   Pulse: 77 76   Resp: 16 16   Temp: 98  F (36.7  C) 98.5  F (36.9  C)   TempSrc: Oral Oral   SpO2: 98% 100%   Weight: 100.6 kg (221 lb 11.2 oz)       Physical Exam  Constitutional:       General: He is not in acute distress.     Appearance: He is well-developed and normal weight. He is not ill-appearing, toxic-appearing or diaphoretic.   HENT:      Mouth/Throat:      Mouth: Mucous membranes are moist.      Pharynx: Oropharynx is clear.   Cardiovascular:      Rate and Rhythm: Normal rate and regular rhythm.      Heart sounds: Normal heart sounds.   Pulmonary:      Effort: Pulmonary effort is normal. No respiratory distress.      Breath sounds: Normal breath sounds.   Abdominal:      General: Bowel sounds are normal. There is distension.      Palpations: Abdomen is soft.      Tenderness: There is abdominal tenderness in the periumbilical area and left lower quadrant. There is no right CVA tenderness, left CVA tenderness, guarding or rebound. Negative signs include Fung's sign, Rovsing's sign and McBurney's sign.      Hernia: No hernia is present.   Skin:     General: Skin is warm.       Findings: No bruising, erythema or rash.   Neurological:      Mental Status: He is alert and oriented to person, place, and time. Mental status is at baseline.   Psychiatric:         Mood and Affect: Mood normal.         Behavior: Behavior normal.         ED Course, Procedures, & Data      Procedures              Results for orders placed or performed during the hospital encounter of 03/18/25   CT Abdomen Pelvis w Contrast     Status: None    Narrative    EXAM: CT ABDOMEN PELVIS W CONTRAST  LOCATION: [facility]  DATE: 3/18/2025    INDICATION: Left lower quadrant and periumbilical pain. History of renal transplant.  COMPARISON: CT abdomen pelvis 2/11/2022.  TECHNIQUE: CT scan of the abdomen and pelvis was performed following injection of IV contrast. Multiplanar reformats were obtained. Dose reduction techniques were used.  CONTRAST: 112 mL isovue 370    FINDINGS:   LOWER CHEST: A 6 mm juxtapleural nodule in the right middle lobe (5/#31) has not significantly changed since 2007 and is likely benign. Mitral annular and coronary arterial calcifications.    HEPATOBILIARY: Morphologic changes of chronic liver disease and possible cirrhosis. No focal liver lesions. No calcified gallstones or biliary ductal dilation.    PANCREAS: Normal.    SPLEEN: Normal. Small accessory splenule in the left upper quadrant.    ADRENAL GLANDS: Normal.    KIDNEYS/BLADDER: Symmetric atrophy of the bilateral native kidneys. Small benign cysts within both native kidneys, which do not require follow-up. No native renal calculi or hydronephrosis. Normal enhancement of the right lower quadrant renal transplant.   Mild fullness of the transplant collecting system. No urinary calculi within the transplant or transplant ureter. No peritransplant fluid collections. Bladder is mildly distended and appears mildly thick-walled.    BOWEL: No bowel obstruction or inflammation. Normal appendix. A 3.5 cm focus  of the encapsulated fat within the left upper quadrant mesentery without surrounding inflammation may be a focus of remote fat necrosis (5/#89).    LYMPH NODES: No enlarged lymph nodes.    VASCULATURE: Patent portal, splenic, and superior mesenteric veins. Mild aortobiiliac atherosclerosis. No abdominal aortic aneurysm.    PELVIC ORGANS: Mildly enlarged prostate.     MUSCULOSKELETAL: Small fat-containing left inguinal hernia. Scarring within the right lower quadrant abdominal wall related to the previous transplant surgery. Tiny fat-containing periumbilical hernia. Multilevel degenerative changes of the spine. No   acute bony abnormality.      Impression    IMPRESSION:     1.  No definite acute abnormality or specific cause of the patient's symptoms are identified.    2.  Mildly thick-walled urinary bladder, likely reflecting sequela of chronic bladder outlet obstruction. Correlate with urinalysis if there is clinical concern for cystitis.    3.  Mild fullness of the right lower quadrant renal transplant collecting system is likely physiologic in the setting of a mildly distended urinary bladder. No obstructing urinary calculi are present.    4.  Morphologic changes of chronic liver disease and possible cirrhosis. No splenomegaly or ascites.   Comprehensive metabolic panel     Status: Abnormal   Result Value Ref Range    Sodium 137 135 - 145 mmol/L    Potassium 4.3 3.4 - 5.3 mmol/L    Carbon Dioxide (CO2) 24 22 - 29 mmol/L    Anion Gap 12 7 - 15 mmol/L    Urea Nitrogen 16.6 8.0 - 23.0 mg/dL    Creatinine 1.00 0.67 - 1.17 mg/dL    GFR Estimate 78 >60 mL/min/1.73m2    Calcium 9.4 8.8 - 10.4 mg/dL    Chloride 101 98 - 107 mmol/L    Glucose 233 (H) 70 - 99 mg/dL    Alkaline Phosphatase 108 40 - 150 U/L    AST 28 0 - 45 U/L    ALT 45 0 - 70 U/L    Protein Total 7.4 6.4 - 8.3 g/dL    Albumin 4.2 3.5 - 5.2 g/dL    Bilirubin Total 0.5 <=1.2 mg/dL   UA with Microscopic reflex to Culture     Status: Abnormal    Specimen:  Urine, Midstream   Result Value Ref Range    Color Urine Straw Colorless, Straw, Light Yellow, Yellow    Appearance Urine Clear Clear    Glucose Urine 70 (A) Negative mg/dL    Bilirubin Urine Negative Negative    Ketones Urine Negative Negative mg/dL    Specific Gravity Urine 1.013 1.003 - 1.035    Blood Urine Negative Negative    pH Urine 6.5 5.0 - 7.0    Protein Albumin Urine Negative Negative mg/dL    Urobilinogen Urine Normal Normal, 2.0 mg/dL    Nitrite Urine Negative Negative    Leukocyte Esterase Urine Negative Negative    RBC Urine 1 <=2 /HPF    WBC Urine <1 <=5 /HPF    Narrative    Urine Culture not indicated   Lipase     Status: Normal   Result Value Ref Range    Lipase 34 13 - 60 U/L   CBC with platelets and differential     Status: Abnormal   Result Value Ref Range    WBC Count 11.4 (H) 4.0 - 11.0 10e3/uL    RBC Count 4.87 4.40 - 5.90 10e6/uL    Hemoglobin 13.7 13.3 - 17.7 g/dL    Hematocrit 42.0 40.0 - 53.0 %    MCV 86 78 - 100 fL    MCH 28.1 26.5 - 33.0 pg    MCHC 32.6 31.5 - 36.5 g/dL    RDW 13.9 10.0 - 15.0 %    Platelet Count 235 150 - 450 10e3/uL    % Neutrophils 81 %    % Lymphocytes 13 %    % Monocytes 5 %    % Eosinophils 1 %    % Basophils 0 %    % Immature Granulocytes 0 %    NRBCs per 100 WBC 0 <1 /100    Absolute Neutrophils 9.2 (H) 1.6 - 8.3 10e3/uL    Absolute Lymphocytes 1.5 0.8 - 5.3 10e3/uL    Absolute Monocytes 0.5 0.0 - 1.3 10e3/uL    Absolute Eosinophils 0.1 0.0 - 0.7 10e3/uL    Absolute Basophils 0.0 0.0 - 0.2 10e3/uL    Absolute Immature Granulocytes 0.0 <=0.4 10e3/uL    Absolute NRBCs 0.0 10e3/uL   CBC with Platelets & Differential     Status: Abnormal    Narrative    The following orders were created for panel order CBC with Platelets & Differential.  Procedure                               Abnormality         Status                     ---------                               -----------         ------                     CBC with platelets and ...[[id number]]  Abnormal             Final result                 Please view results for these tests on the individual orders.     Medications   sodium chloride 0.9% BOLUS 1,000 mL (0 mLs Intravenous Stopped 3/18/25 1637)   acetaminophen (TYLENOL) tablet 1,000 mg (1,000 mg Oral $Given 3/18/25 1311)   iopamidol (ISOVUE-370) solution 100 mL (112 mLs Intravenous $Given 3/18/25 1425)     Labs Ordered and Resulted from Time of ED Arrival to Time of ED Departure   COMPREHENSIVE METABOLIC PANEL - Abnormal       Result Value    Sodium 137      Potassium 4.3      Carbon Dioxide (CO2) 24      Anion Gap 12      Urea Nitrogen 16.6      Creatinine 1.00      GFR Estimate 78      Calcium 9.4      Chloride 101      Glucose 233 (*)     Alkaline Phosphatase 108      AST 28      ALT 45      Protein Total 7.4      Albumin 4.2      Bilirubin Total 0.5     ROUTINE UA WITH MICROSCOPIC REFLEX TO CULTURE - Abnormal    Color Urine Straw      Appearance Urine Clear      Glucose Urine 70 (*)     Bilirubin Urine Negative      Ketones Urine Negative      Specific Gravity Urine 1.013      Blood Urine Negative      pH Urine 6.5      Protein Albumin Urine Negative      Urobilinogen Urine Normal      Nitrite Urine Negative      Leukocyte Esterase Urine Negative      RBC Urine 1      WBC Urine <1     CBC WITH PLATELETS AND DIFFERENTIAL - Abnormal    WBC Count 11.4 (*)     RBC Count 4.87      Hemoglobin 13.7      Hematocrit 42.0      MCV 86      MCH 28.1      MCHC 32.6      RDW 13.9      Platelet Count 235      % Neutrophils 81      % Lymphocytes 13      % Monocytes 5      % Eosinophils 1      % Basophils 0      % Immature Granulocytes 0      NRBCs per 100 WBC 0      Absolute Neutrophils 9.2 (*)     Absolute Lymphocytes 1.5      Absolute Monocytes 0.5      Absolute Eosinophils 0.1      Absolute Basophils 0.0      Absolute Immature Granulocytes 0.0      Absolute NRBCs 0.0     LIPASE - Normal    Lipase 34       CT Abdomen Pelvis w Contrast   Final Result   IMPRESSION:       1.  No  definite acute abnormality or specific cause of the patient's symptoms are identified.      2.  Mildly thick-walled urinary bladder, likely reflecting sequela of chronic bladder outlet obstruction. Correlate with urinalysis if there is clinical concern for cystitis.      3.  Mild fullness of the right lower quadrant renal transplant collecting system is likely physiologic in the setting of a mildly distended urinary bladder. No obstructing urinary calculi are present.      4.  Morphologic changes of chronic liver disease and possible cirrhosis. No splenomegaly or ascites.             Critical care was not performed.     Medical Decision Making  The patient's presentation was of high complexity (an acute health issue posing potential threat to life or bodily function).    The patient's evaluation involved:  ordering and/or review of 3+ test(s) in this encounter (see separate area of note for details)    The patient's management necessitated moderate risk (prescription drug management including medications given in the ED) and moderate risk (IV contrast administration).    Assessment & Plan    Berry is a 74-year-old male that presented to the ED with complaints of abdominal pain and concerns with constipation.  Acceptable vital signs on presentation.  Patient in no acute distress and nontoxic-appearing.  Abdomen is soft without guarding, rebound, rigidity on exam.  Labs initiated and otherwise unremarkable within normal limits.  Glucose 233.  UA negative for signs of infection.  CT abdomen and pelvis obtained and negative for any definitive acute abnormality or specific cause of the patient's symptoms but notable for mildly thick walled urinary bladder likely reflecting chronic bladder outlet obstruction or cystitis as well as mild fullness of the right lower quadrant renal transplant collecting system which is likely physiologic in the setting of a distended urinary bladder but without obstructing urinary  calculi.  Discussed the report of mild fullness of the right lower quadrant renal transplant with the reading radiologist who stated that it does not appear as though it is hydronephrosis and only mild in nature.  He would not otherwise recommend follow-up on this and suspect that it is mostly due to his distended urinary bladder.  IV NS bolus and p.o. Tylenol given in the ED.  Discussed reassuring lab and imaging results with the patient at length as well as the recommendation for discharge home with the patient and his family.  Strict return precautions given.  Advise beginning MiraLAX daily to help with his bowel movements.  Advise follow-up with primary care office for reevaluation recheck up after his ED visit today.  Continue to follow with his transplant nephrologist.  Patient was agreeable to the discharge treatment plan, voiced understanding, and all questions answered prior to discharge.    I have reviewed the nursing notes. I have reviewed the findings, diagnosis, plan and need for follow up with the patient.    Discharge Medication List as of 3/18/2025  4:37 PM        START taking these medications    Details   polyethylene glycol (MIRALAX) 17 GM/Dose powder Take 17 g (1 Capful) by mouth daily., Disp-578 g, R-0, Local Print             Final diagnoses:   Abdominal pain       Mignon Mcmillan PA-C  [facility] EMERGENCY DEPARTMENT  3/18/2025     Mignon Mcmillan PA-C  03/18/25 3171

## 2025-03-18 NOTE — DISCHARGE INSTRUCTIONS
Begin MiraLAX daily to help with keeping your bowel movements regular and soft  Continue plenty of fluids as this will also help with your bowel movements and work well with the MiraLAX  Follow-up with your primary care office in 1 week for reevaluation, recheck of symptoms, and further medication management as needed if your symptoms do not show improvement or worsen  Follow-up with your transplant nephrologist as scheduled for further monitoring of your kidney function and transplant  Otherwise, do not hesitate to seek urgent or emergent reevaluation if you have any worsening or concerning signs or symptoms   Date of Service: 06/22/2020    PREOPERATIVE DIAGNOSIS:   Recurrent trigeminal neuralgia on the right.    POSTOPERATIVE DIAGNOSIS:  Recurrent trigeminal neuralgia on the right.    PROCEDURE PERFORMED:  Right percutaneous balloon compression of the trigeminal nerve.    SURGEON:  Marco Conley MD.    ASSISTANT:  Deb Murphy PA-C.    A surgical assistant was utilized in this case for tasks including, but not limited to, positioning the patient, placing and holding surgical retractors, assisting in dissection, and wound closure under my supervision.  The assistant's role was critical of the completion of the surgical procedure in the safest, most efficient manner.    ANESTHESIA:  General endotracheal.    HISTORY:  This is a woman who has previously had typical trigeminal neuralgia.  She did undergo a percutaneous balloon compression many years before and did well, but now has recurrence of her symptoms, has failed to improve with medical treatment.    TECHNICAL NOTE:  The patient was taken to the operating room and placed under general endotracheal anesthesia.  She was placed supine with her head on a horseshoe.  X-ray was brought in for lateral images with the C-arm.  After this was completed, then the face was prepped and draped in a sterile fashion on the right side and a timeout procedure was performed.    A small skin puncture site was made with a 15 blade knife.  After this was completed, a 14 gauge epidural needle was placed into the foramen ovale and this was done using the typical landmarks with the mid pupillary line and just anterior to the coronal suture and developing a plane from the entry point about 2 cm lateral to the commissure of the mouth on the right side.  At this point, then, a wire was penetrated through to make sure that the catheter could get intracranial and this was seen on the lateral fluoroscope.  A 4-Kazakh Broviac catheter was then placed and then inflated with 1 mL of  Isovue.  The balloon did rupture and a new catheter was placed.  Again, this was inflated with 1 mL and then this did provide compression.  X-ray was taken; the balloon was in good position.  After this was completed and the nerve was compressed for a minute, the apparatus was withdrawn.  Band-Aid was placed.        Dictated By: Marco Conley MD  Signing Provider: Marco Conley MD    /lbi ([id number])  DD: 06/22/2020 18:20:10 TD: 06/22/2020 19:01:59    Copy Sent To:

## 2025-03-19 ENCOUNTER — HOSPITAL ENCOUNTER (EMERGENCY)
Facility: CLINIC | Age: 75
Discharge: HOME | End: 2025-03-19
Attending: FAMILY MEDICINE | Admitting: FAMILY MEDICINE
Payer: COMMERCIAL

## 2025-03-19 VITALS
HEIGHT: 67 IN | TEMPERATURE: 97.8 F | DIASTOLIC BLOOD PRESSURE: 82 MMHG | RESPIRATION RATE: 19 BRPM | HEART RATE: 89 BPM | SYSTOLIC BLOOD PRESSURE: 155 MMHG | OXYGEN SATURATION: 98 % | WEIGHT: 213 LBS | BODY MASS INDEX: 33.43 KG/M2

## 2025-03-19 DIAGNOSIS — R11.2 NAUSEA AND VOMITING, UNSPECIFIED VOMITING TYPE: ICD-10-CM

## 2025-03-19 DIAGNOSIS — R10.84 GENERALIZED ABDOMINAL PAIN: ICD-10-CM

## 2025-03-19 DIAGNOSIS — K59.01 SLOW TRANSIT CONSTIPATION: ICD-10-CM

## 2025-03-19 LAB
ALBUMIN SERPL BCG-MCNC: 4.3 G/DL (ref 3.5–5.2)
ALP SERPL-CCNC: 110 U/L (ref 40–150)
ALT SERPL W P-5'-P-CCNC: 38 U/L (ref 0–70)
ANION GAP SERPL CALCULATED.3IONS-SCNC: 16 MMOL/L (ref 7–15)
AST SERPL W P-5'-P-CCNC: 39 U/L (ref 0–45)
BASOPHILS # BLD AUTO: 0 10E3/UL (ref 0–0.2)
BASOPHILS NFR BLD AUTO: 0 %
BILIRUB SERPL-MCNC: 0.6 MG/DL
BUN SERPL-MCNC: 10.2 MG/DL (ref 8–23)
CALCIUM SERPL-MCNC: 9.2 MG/DL (ref 8.8–10.4)
CHLORIDE SERPL-SCNC: 96 MMOL/L (ref 98–107)
CREAT SERPL-MCNC: 0.8 MG/DL (ref 0.67–1.17)
EGFRCR SERPLBLD CKD-EPI 2021: >90 ML/MIN/1.73M2
EOSINOPHIL # BLD AUTO: 0 10E3/UL (ref 0–0.7)
EOSINOPHIL NFR BLD AUTO: 0 %
ERYTHROCYTE [DISTWIDTH] IN BLOOD BY AUTOMATED COUNT: 14 % (ref 10–15)
FLUAV RNA SPEC QL NAA+PROBE: NEGATIVE
FLUBV RNA RESP QL NAA+PROBE: NEGATIVE
GLUCOSE SERPL-MCNC: 276 MG/DL (ref 70–99)
HCO3 SERPL-SCNC: 22 MMOL/L (ref 22–29)
HCT VFR BLD AUTO: 44.1 % (ref 40–53)
HGB BLD-MCNC: 14.4 G/DL (ref 13.3–17.7)
IMM GRANULOCYTES # BLD: 0.1 10E3/UL
IMM GRANULOCYTES NFR BLD: 1 %
LIPASE SERPL-CCNC: 25 U/L (ref 13–60)
LYMPHOCYTES # BLD AUTO: 1.2 10E3/UL (ref 0.8–5.3)
LYMPHOCYTES NFR BLD AUTO: 8 %
MCH RBC QN AUTO: 27.7 PG (ref 26.5–33)
MCHC RBC AUTO-ENTMCNC: 32.7 G/DL (ref 31.5–36.5)
MCV RBC AUTO: 85 FL (ref 78–100)
MONOCYTES # BLD AUTO: 0.8 10E3/UL (ref 0–1.3)
MONOCYTES NFR BLD AUTO: 5 %
NEUTROPHILS # BLD AUTO: 13.9 10E3/UL (ref 1.6–8.3)
NEUTROPHILS NFR BLD AUTO: 87 %
NRBC # BLD AUTO: 0 10E3/UL
NRBC BLD AUTO-RTO: 0 /100
PLATELET # BLD AUTO: 265 10E3/UL (ref 150–450)
POTASSIUM SERPL-SCNC: 5.2 MMOL/L (ref 3.4–5.3)
PROT SERPL-MCNC: 8 G/DL (ref 6.4–8.3)
RBC # BLD AUTO: 5.2 10E6/UL (ref 4.4–5.9)
RSV RNA SPEC NAA+PROBE: NEGATIVE
SARS-COV-2 RNA RESP QL NAA+PROBE: NEGATIVE
SODIUM SERPL-SCNC: 134 MMOL/L (ref 135–145)
WBC # BLD AUTO: 16.1 10E3/UL (ref 4–11)

## 2025-03-19 PROCEDURE — 258N000003 HC RX IP 258 OP 636: Performed by: FAMILY MEDICINE

## 2025-03-19 PROCEDURE — 96374 THER/PROPH/DIAG INJ IV PUSH: CPT | Performed by: FAMILY MEDICINE

## 2025-03-19 PROCEDURE — 96361 HYDRATE IV INFUSION ADD-ON: CPT | Performed by: FAMILY MEDICINE

## 2025-03-19 PROCEDURE — 80053 COMPREHEN METABOLIC PANEL: CPT | Performed by: FAMILY MEDICINE

## 2025-03-19 PROCEDURE — 87637 SARSCOV2&INF A&B&RSV AMP PRB: CPT | Performed by: FAMILY MEDICINE

## 2025-03-19 PROCEDURE — 999N000285 HC STATISTIC VASC ACCESS LAB DRAW WITH PIV START

## 2025-03-19 PROCEDURE — 83690 ASSAY OF LIPASE: CPT | Performed by: FAMILY MEDICINE

## 2025-03-19 PROCEDURE — 99284 EMERGENCY DEPT VISIT MOD MDM: CPT | Mod: 25 | Performed by: FAMILY MEDICINE

## 2025-03-19 PROCEDURE — 250N000011 HC RX IP 250 OP 636: Performed by: FAMILY MEDICINE

## 2025-03-19 PROCEDURE — 999N000127 HC STATISTIC PERIPHERAL IV START W US GUIDANCE

## 2025-03-19 PROCEDURE — 85025 COMPLETE CBC W/AUTO DIFF WBC: CPT | Performed by: FAMILY MEDICINE

## 2025-03-19 PROCEDURE — 99284 EMERGENCY DEPT VISIT MOD MDM: CPT | Performed by: FAMILY MEDICINE

## 2025-03-19 PROCEDURE — 250N000013 HC RX MED GY IP 250 OP 250 PS 637: Performed by: FAMILY MEDICINE

## 2025-03-19 RX ORDER — ONDANSETRON 4 MG/1
4 TABLET, ORALLY DISINTEGRATING ORAL EVERY 6 HOURS PRN
Qty: 10 TABLET | Refills: 0 | Status: SHIPPED | OUTPATIENT
Start: 2025-03-19 | End: 2025-03-22

## 2025-03-19 RX ORDER — SENNA AND DOCUSATE SODIUM 50; 8.6 MG/1; MG/1
1 TABLET, FILM COATED ORAL AT BEDTIME
Qty: 30 TABLET | Refills: 0 | Status: SHIPPED | OUTPATIENT
Start: 2025-03-19

## 2025-03-19 RX ORDER — ONDANSETRON 2 MG/ML
8 INJECTION INTRAMUSCULAR; INTRAVENOUS ONCE
Status: COMPLETED | OUTPATIENT
Start: 2025-03-19 | End: 2025-03-19

## 2025-03-19 RX ADMIN — ONDANSETRON 8 MG: 2 INJECTION INTRAMUSCULAR; INTRAVENOUS at 11:00

## 2025-03-19 RX ADMIN — SIMETHICONE 226 ML: 125 CAPSULE, LIQUID FILLED ORAL at 11:56

## 2025-03-19 RX ADMIN — SODIUM CHLORIDE 1000 ML: 0.9 INJECTION, SOLUTION INTRAVENOUS at 10:58

## 2025-03-19 ASSESSMENT — COLUMBIA-SUICIDE SEVERITY RATING SCALE - C-SSRS
6. HAVE YOU EVER DONE ANYTHING, STARTED TO DO ANYTHING, OR PREPARED TO DO ANYTHING TO END YOUR LIFE?: NO
1. IN THE PAST MONTH, HAVE YOU WISHED YOU WERE DEAD OR WISHED YOU COULD GO TO SLEEP AND NOT WAKE UP?: NO
2. HAVE YOU ACTUALLY HAD ANY THOUGHTS OF KILLING YOURSELF IN THE PAST MONTH?: NO

## 2025-03-19 ASSESSMENT — ACTIVITIES OF DAILY LIVING (ADL)
ADLS_ACUITY_SCORE: 41

## 2025-03-19 NOTE — ED TRIAGE NOTES
Triage Assessment (Adult)       Row Name 03/19/25 0959          Triage Assessment    Airway WDL WDL        Respiratory WDL    Respiratory WDL WDL        Skin Circulation/Temperature WDL    Skin Circulation/Temperature WDL WDL        Cardiac WDL    Cardiac WDL WDL        Peripheral/Neurovascular WDL    Peripheral Neurovascular WDL WDL        Cognitive/Neuro/Behavioral WDL    Cognitive/Neuro/Behavioral WDL WDL        Oketo Coma Scale    Best Eye Response 4-->(E4) spontaneous     Best Motor Response 6-->(M6) obeys commands     Best Verbal Response 5-->(V5) oriented     Le Coma Scale Score 15

## 2025-03-19 NOTE — ED PROVIDER NOTES
[facility] [place] ([facility])    3/19/25      ED PROVIDER NOTE   ED 4  History     Chief Complaint   Patient presents with    Nausea, Vomiting, & Diarrhea     Pt was seen in ED yesterday. After discharge pt began vomiting and continued to vomit all night. Pt C/O of constipation. Pt has hx of kidney transplant in 2023     The history is provided by the patient, medical records and a relative (Emir (son)). A  was used ([employer] [place]  Garcia).     Jass Fierro is a 74 year old Greenlandic speaking male with history of ESRD s/p kidney transplant (2/1/2023), T2DM, HTN, severe CAD status post PTCA who presents to the emergency department with constipation that started 3 days ago, abdominal pain that started 2 days ago, and nausea and vomiting that started last night.  He had presented to the emergency department yesterday with abdominal pain, flank pain and dysuria.  He was seen by Mignon Mcmillan PA-C as well as Dr. Kalyan Rudolph who performed workup with UA, CMP, lipase, CBC as well as CT of the abdomen pelvis with contrast.  His CT imaging showed some mild fullness of the right lower quadrant renal transplant collecting system as well as a mild thick-walled urinary bladder which was felt to be reflective of chronic bladder outlet obstruction.  Case was discussed with renal transplant service.  He was advised to take MiraLAX to help him with his constipation  and to follow-up with primary care and Transplant Nephrology, otherwise return to the ED for worsening symptoms.  He states he didn't feel better after the medications given yesterday (he was given a liter of IV fluids and acetaminophen).  He also nausea and multiple episodes of vomiting that started last night immediately after discharge.  The vomiting is triggered after any oral intake, even small sips of water. He wasn't able to sleep because he was up all night vomiting.  He also has heartburn  and gas problems with upper abdominal discomfort and bloating.  The pain is more on his left side.  Has had abdominal pain in the past but the nausea and vomiting is new.  He is not fasting for Ramadan given his kidney transplant status and need to take immunosuppressants. He was able to keep down his transplant medications today. He continues to have constipation, last stooled 3 days ago. He typically has bowel movements daily so this is atypical for him, doesn't know why he is having constipation. Stools have been harder in general. He states the stool softener he was prescribed doesn't treat nausea or vomiting.  He believes the vomiting is related to his constipation.  Prior to the last 3 days he was having a lot of small hard stools.  Did not have rectal exam yesterday. He is not on any antiemetic medications. Does make urine. No one else at home is sick. No rhinorrhea, cough, sore throat, shortness of breath. He notes having blood work yesterday and asks if it is necessary to repeat this.     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 internal sphincterotomy     ondansetron (ZOFRAN ODT) 4 MG ODT tab  SENNA-docusate sodium (SENNA S) 8.6-50  "MG tablet  acetaminophen (TYLENOL) 500 MG tablet  amLODIPine (NORVASC) 10 MG tablet  ASPIRIN 81 MG OR TABS  atorvastatin (LIPITOR) 40 MG tablet  blood glucose monitoring (NO BRAND SPECIFIED) meter device kit  Calcium Carb-Cholecalciferol (CALCIUM 500 + D) 500-3.125 MG-MCG TABS  clopidogrel (PLAVIX) 300 MG TABS tablet  clopidogrel (PLAVIX) 75 MG tablet  COMPRESSION STOCKINGS  COMPRESSION STOCKINGS  Continuous Glucose Sensor (FREESTYLE BLAKE 2 SENSOR) MISC  insulin aspart (NOVOLOG FLEXPEN) 100 UNIT/ML pen  insulin glargine (LANTUS PEN) 100 UNIT/ML pen  Lancets MISC  magnesium oxide (MAG-OX) 400 MG tablet  metoprolol succinate ER (TOPROL XL) 50 MG 24 hr tablet  mycophenolic acid (GENERIC EQUIVALENT) 180 MG EC tablet  mycophenolic acid (GENERIC EQUIVALENT) 360 MG EC tablet  polyethylene glycol (MIRALAX) 17 GM/Dose powder  predniSONE (DELTASONE) 5 MG tablet  tacrolimus (GENERIC EQUIVALENT) 0.5 MG capsule  tacrolimus (GENERIC) 1 MG capsule  tamsulosin (FLOMAX) 0.4 MG capsule  torsemide (DEMADEX) 10 MG tablet      Allergies   Allergen Reactions    Gramineae Pollens Itching     \"seasonal\"    Seasonal Allergies Itching     Family History  Family History   Problem Relation Age of Onset    Family History Negative Other     Diabetes Other     Hypertension Other     Diabetes Mother     Diabetes Father     Hypertension Father     Hypertension Brother     Cancer No family hx of     Glaucoma No family hx of      Social History   Social History     Tobacco Use    Smoking status: Never   Substance Use Topics    Alcohol use: No    Drug use: No      A medically appropriate review of systems was performed with pertinent positives and negatives noted in the HPI, and all other systems negative.    Physical Exam   BP: 132/83  Pulse: (!) 121  Temp: 98.1  F (36.7  C)  Resp: 25  Height: 171 cm (5' 7.32\")  Weight: 96.6 kg (213 lb)  SpO2: 98 %  Physical Exam  Vitals and nursing note reviewed.   Constitutional:       General: He is not in acute " distress.     Appearance: Normal appearance. He is not toxic-appearing.      Comments: Patient resting on cart, alert, conversant   HENT:      Head: Atraumatic.   Eyes:      General: No scleral icterus.     Conjunctiva/sclera: Conjunctivae normal.   Cardiovascular:      Rate and Rhythm: Normal rate.      Heart sounds: Normal heart sounds.   Pulmonary:      Effort: Pulmonary effort is normal. No respiratory distress.      Breath sounds: Normal breath sounds.   Abdominal:      Palpations: Abdomen is soft.      Tenderness: There is no abdominal tenderness.   Genitourinary:     Rectum: Guaiac result negative. Mass present. No tenderness.      Comments: Golf ball sized hard stool in rectal vault, unable to manually evacuate this  Musculoskeletal:         General: No deformity.      Cervical back: Neck supple.   Skin:     General: Skin is warm.   Neurological:      Mental Status: He is alert.       ED Course, Procedures, & Data      Procedures                Results for orders placed or performed during the hospital encounter of 03/19/25   Comprehensive metabolic panel     Status: Abnormal   Result Value Ref Range    Sodium 134 (L) 135 - 145 mmol/L    Potassium 5.2 3.4 - 5.3 mmol/L    Carbon Dioxide (CO2) 22 22 - 29 mmol/L    Anion Gap 16 (H) 7 - 15 mmol/L    Urea Nitrogen 10.2 8.0 - 23.0 mg/dL    Creatinine 0.80 0.67 - 1.17 mg/dL    GFR Estimate >90 >60 mL/min/1.73m2    Calcium 9.2 8.8 - 10.4 mg/dL    Chloride 96 (L) 98 - 107 mmol/L    Glucose 276 (H) 70 - 99 mg/dL    Alkaline Phosphatase 110 40 - 150 U/L    AST 39 0 - 45 U/L    ALT 38 0 - 70 U/L    Protein Total 8.0 6.4 - 8.3 g/dL    Albumin 4.3 3.5 - 5.2 g/dL    Bilirubin Total 0.6 <=1.2 mg/dL   Lipase     Status: Normal   Result Value Ref Range    Lipase 25 13 - 60 U/L   Influenza A/B, RSV and SARS-CoV2 PCR (COVID-19) Nasopharyngeal     Status: Normal    Specimen: Nasopharyngeal; Swab   Result Value Ref Range    Influenza A PCR Negative Negative    Influenza B PCR  Negative Negative    RSV PCR Negative Negative    SARS CoV2 PCR Negative Negative    Narrative    Testing was performed using the Xpert Xpress CoV2/Flu/RSV Assay on the [employer] GeneXpert Instrument. This test should be ordered for the detection of SARS-CoV2, influenza, and RSV viruses in individuals with signs and symptoms of respiratory tract infection. This test is for in vitro diagnostic use under the [place] FDA for laboratories certified under CLIA to perform high or moderate complexity testing. This test has been [place] FDA cleared. A negative result does not rule out the presence of PCR inhibitors in the specimen or target RNA in concentration below the limit of detection for the assay. If only one viral target is positive but coinfection with multiple targets is suspected, the sample should be re-tested with another FDA cleared, approved, or authorized test, if coninfection would change clinical management. This test was validated by the [facility]. These laboratories are certified under the Clinical Laboratory Improvement Amendments of 1988 (CLIA-88) as qualified to perfom high complexity laboratory testing.   CBC with platelets and differential     Status: Abnormal   Result Value Ref Range    WBC Count 16.1 (H) 4.0 - 11.0 10e3/uL    RBC Count 5.20 4.40 - 5.90 10e6/uL    Hemoglobin 14.4 13.3 - 17.7 g/dL    Hematocrit 44.1 40.0 - 53.0 %    MCV 85 78 - 100 fL    MCH 27.7 26.5 - 33.0 pg    MCHC 32.7 31.5 - 36.5 g/dL    RDW 14.0 10.0 - 15.0 %    Platelet Count 265 150 - 450 10e3/uL    % Neutrophils 87 %    % Lymphocytes 8 %    % Monocytes 5 %    % Eosinophils 0 %    % Basophils 0 %    % Immature Granulocytes 1 %    NRBCs per 100 WBC 0 <1 /100    Absolute Neutrophils 13.9 (H) 1.6 - 8.3 10e3/uL    Absolute Lymphocytes 1.2 0.8 - 5.3 10e3/uL    Absolute Monocytes 0.8 0.0 - 1.3 10e3/uL    Absolute Eosinophils 0.0 0.0 - 0.7 10e3/uL    Absolute Basophils 0.0 0.0 - 0.2 10e3/uL    Absolute Immature  Granulocytes 0.1 <=0.4 10e3/uL    Absolute NRBCs 0.0 10e3/uL   CBC with platelets differential     Status: Abnormal    Narrative    The following orders were created for panel order CBC with platelets differential.  Procedure                               Abnormality         Status                     ---------                               -----------         ------                     CBC with platelets and ...[[id number]]  Abnormal            Final result                 Please view results for these tests on the individual orders.     Medications   sodium chloride 0.9% BOLUS 1,000 mL (0 mLs Intravenous Stopped 3/19/25 1258)   ondansetron (ZOFRAN) injection 8 mg (8 mg Intravenous $Given 3/19/25 1100)   Enema Compound (docusate/mineral oil/NaPhos) NO MAG CIT PREMIX (226 mLs Rectal $Given 3/19/25 1156)     Labs Ordered and Resulted from Time of ED Arrival to Time of ED Departure   COMPREHENSIVE METABOLIC PANEL - Abnormal       Result Value    Sodium 134 (*)     Potassium 5.2      Carbon Dioxide (CO2) 22      Anion Gap 16 (*)     Urea Nitrogen 10.2      Creatinine 0.80      GFR Estimate >90      Calcium 9.2      Chloride 96 (*)     Glucose 276 (*)     Alkaline Phosphatase 110      AST 39      ALT 38      Protein Total 8.0      Albumin 4.3      Bilirubin Total 0.6     CBC WITH PLATELETS AND DIFFERENTIAL - Abnormal    WBC Count 16.1 (*)     RBC Count 5.20      Hemoglobin 14.4      Hematocrit 44.1      MCV 85      MCH 27.7      MCHC 32.7      RDW 14.0      Platelet Count 265      % Neutrophils 87      % Lymphocytes 8      % Monocytes 5      % Eosinophils 0      % Basophils 0      % Immature Granulocytes 1      NRBCs per 100 WBC 0      Absolute Neutrophils 13.9 (*)     Absolute Lymphocytes 1.2      Absolute Monocytes 0.8      Absolute Eosinophils 0.0      Absolute Basophils 0.0      Absolute Immature Granulocytes 0.1      Absolute NRBCs 0.0     LIPASE - Normal    Lipase 25     INFLUENZA A/B, RSV AND SARS-COV2 PCR -  Normal    Influenza A PCR Negative      Influenza B PCR Negative      RSV PCR Negative      SARS CoV2 PCR Negative     ROUTINE UA WITH MICROSCOPIC REFLEX TO CULTURE     No orders to display          Critical care was not performed.     Medical Decision Making  The patient's presentation was of moderate complexity (an acute illness with systemic symptoms).    The patient's evaluation involved:  review of external note(s) from 1 sources (review of documentation from ED visit dated 3/18/2025)  review of 3+ test result(s) ordered prior to this encounter (review of labs from visit on 3/18/2025, review of imaging from visit on 3/18/2025)  strong consideration of a test (repeat CT scan was considered, however based on his physical exam, serial exam, clinical course in the ED, was ultimately deferred) that was ultimately deferred  ordering and/or review of 3+ test(s) in this encounter (see separate area of note for details)    The patient's management necessitated moderate risk (prescription drug management including medications given in the ED).    Assessment & Plan    Jass Fierro is a 74 year old [place] Islander speaking male with history of ESRD s/p kidney transplant (2/1/2023), T2DM, HTN, severe CAD status post PTCA who presents to the emergency department with constipation that started 3 days ago, abdominal pain that started 2 days ago, and nausea and vomiting that started last night.  Extensive workup in the ED yesterday without acute findings on abdominal CT after labs reveal leukocytosis baseline renal function.  Was improved prior to discharge but is now frustrated as he has still not had a bowel movement is vomited several times.  Exam today he is tachycardic initially, other vitals normal.  He does not appear in any acute distress, converses with me without difficulty.  He is denying any respiratory symptoms, chest pain, palpitations or fevers.  He does have generalized abdominal tenderness without peritoneal  signs.  He has a golf ball sized piece of hard stool in the rectal vault, Hemoccult negative.  Physical exam otherwise unremarkable.  Labs are consistent with vomiting with some increase in his leukocytosis, mild anion gap metabolic acidosis and hyperglycemia.  He received IV fluids, Zofran, and requested an enema which seems appropriate given the clinical circumstances and recent negative imaging.  He had a pink lady and had a large resolved very hard large bowel movement.  Since that he has been asymptomatic, his serial exam is benign.  He requested and was given an oral challenge which she tolerated without difficulty.  Has not been in the ED for 5 hours without recurrent vomiting, pain resolved after large bowel movement, workup otherwise negative with the exception of labs which could be consistent with recent vomiting illness.  Based on the clinical findings and the entire clinical scenario, the patient appears stable at this time to be treated symptomatically, and to follow-up as an outpatient for any further evaluation and treatment.  Discussed expected course, need for follow up, and indications for return with the patient.  See discharge instructions.      I have reviewed the nursing notes. I have reviewed the findings, diagnosis, plan and need for follow up with the patient.    New Prescriptions    ONDANSETRON (ZOFRAN ODT) 4 MG ODT TAB    Take 1 tablet (4 mg) by mouth every 6 hours as needed for nausea or vomiting.    SENNA-DOCUSATE SODIUM (SENNA S) 8.6-50 MG TABLET    Take 1 tablet by mouth at bedtime.       Final diagnoses:   Nausea and vomiting, unspecified vomiting type   Generalized abdominal pain   Slow transit constipation     ILouise, am serving as a trained medical scribe to document services personally performed by Mark Singh MD based on the provider's statements to me on March 19, 2025.  This document has been checked and approved by the attending provider.    IMark MD,  was physically present and have reviewed and verified the accuracy of this note documented by Louise Alan, medical scribe.      Mark Singh MD   [facility] EMERGENCY DEPARTMENT  3/19/2025     Mark Singh MD  03/19/25 8215

## 2025-03-19 NOTE — DISCHARGE INSTRUCTIONS
Thank you for choosing [facility].     Please closely monitor for further symptoms. Return to the Emergency Department if you develop any new or worsening signs or symptoms.    If you received any opiate pain medications or sedatives during your visit, please do not drive for at least 8 hours.     Labs, cultures or final xray interpretations may still need to be reviewed.  We will call you if your plan of care needs to be changed.    Increase fiber and water in your diet.  Take Senokot to soften your stools.  May use Zofran if needed for nausea, gradually advance your diet as tolerated.  Please follow up with your primary care physician or clinic in the next 3 to 5 days for recheck.

## 2025-03-19 NOTE — CONSULTS
"Consult received for Vascular access care.  See LDA for details. For additional needs place \"Nursing to Consult for Vascular Access\" [id number] order in EPIC.   "

## 2025-04-10 ENCOUNTER — LAB (OUTPATIENT)
Dept: LAB | Facility: CLINIC | Age: 75
End: 2025-04-10
Attending: INTERNAL MEDICINE
Payer: COMMERCIAL

## 2025-04-10 ENCOUNTER — OFFICE VISIT (OUTPATIENT)
Dept: TRANSPLANT | Facility: CLINIC | Age: 75
End: 2025-04-10
Attending: INTERNAL MEDICINE
Payer: COMMERCIAL

## 2025-04-10 VITALS
SYSTOLIC BLOOD PRESSURE: 151 MMHG | HEART RATE: 68 BPM | WEIGHT: 222 LBS | OXYGEN SATURATION: 96 % | DIASTOLIC BLOOD PRESSURE: 80 MMHG | BODY MASS INDEX: 32.88 KG/M2 | HEIGHT: 69 IN

## 2025-04-10 DIAGNOSIS — Z48.298 AFTERCARE FOLLOWING ORGAN TRANSPLANT: ICD-10-CM

## 2025-04-10 DIAGNOSIS — Z98.890 OTHER SPECIFIED POSTPROCEDURAL STATES: ICD-10-CM

## 2025-04-10 DIAGNOSIS — Z94.0 KIDNEY REPLACED BY TRANSPLANT: ICD-10-CM

## 2025-04-10 DIAGNOSIS — Z94.0 HTN, KIDNEY TRANSPLANT RELATED: ICD-10-CM

## 2025-04-10 DIAGNOSIS — I15.1 HTN, KIDNEY TRANSPLANT RELATED: ICD-10-CM

## 2025-04-10 DIAGNOSIS — Z79.899 ENCOUNTER FOR LONG-TERM CURRENT USE OF MEDICATION: ICD-10-CM

## 2025-04-10 LAB
ANION GAP SERPL CALCULATED.3IONS-SCNC: 8 MMOL/L (ref 7–15)
BUN SERPL-MCNC: 10.5 MG/DL (ref 8–23)
CALCIUM SERPL-MCNC: 9.2 MG/DL (ref 8.8–10.4)
CHLORIDE SERPL-SCNC: 106 MMOL/L (ref 98–107)
CREAT SERPL-MCNC: 0.93 MG/DL (ref 0.67–1.17)
EGFRCR SERPLBLD CKD-EPI 2021: 85 ML/MIN/1.73M2
ERYTHROCYTE [DISTWIDTH] IN BLOOD BY AUTOMATED COUNT: 14.6 % (ref 10–15)
GLUCOSE SERPL-MCNC: 213 MG/DL (ref 70–99)
HCO3 SERPL-SCNC: 26 MMOL/L (ref 22–29)
HCT VFR BLD AUTO: 39.3 % (ref 40–53)
HGB BLD-MCNC: 12.6 G/DL (ref 13.3–17.7)
MAGNESIUM SERPL-MCNC: 1.6 MG/DL (ref 1.7–2.3)
MCH RBC QN AUTO: 28.1 PG (ref 26.5–33)
MCHC RBC AUTO-ENTMCNC: 32.1 G/DL (ref 31.5–36.5)
MCV RBC AUTO: 88 FL (ref 78–100)
PLATELET # BLD AUTO: 199 10E3/UL (ref 150–450)
POTASSIUM SERPL-SCNC: 4.3 MMOL/L (ref 3.4–5.3)
RBC # BLD AUTO: 4.49 10E6/UL (ref 4.4–5.9)
SODIUM SERPL-SCNC: 140 MMOL/L (ref 135–145)
TACROLIMUS BLD-MCNC: 6.7 UG/L (ref 5–15)
TME LAST DOSE: NORMAL H
TME LAST DOSE: NORMAL H
WBC # BLD AUTO: 9.5 10E3/UL (ref 4–11)

## 2025-04-10 PROCEDURE — 80197 ASSAY OF TACROLIMUS: CPT | Performed by: INTERNAL MEDICINE

## 2025-04-10 PROCEDURE — 99000 SPECIMEN HANDLING OFFICE-LAB: CPT | Performed by: PATHOLOGY

## 2025-04-10 PROCEDURE — 87799 DETECT AGENT NOS DNA QUANT: CPT | Performed by: INTERNAL MEDICINE

## 2025-04-10 RX ORDER — PREDNISONE 5 MG/1
5 TABLET ORAL DAILY
Qty: 90 TABLET | Refills: 3 | Status: SHIPPED | OUTPATIENT
Start: 2025-04-10

## 2025-04-10 RX ORDER — TACROLIMUS 0.5 MG/1
0.5 CAPSULE ORAL 2 TIMES DAILY
Qty: 180 CAPSULE | Refills: 3 | Status: SHIPPED | OUTPATIENT
Start: 2025-04-10

## 2025-04-10 RX ORDER — TACROLIMUS 1 MG/1
1 CAPSULE ORAL 2 TIMES DAILY
Qty: 180 CAPSULE | Refills: 3 | Status: SHIPPED | OUTPATIENT
Start: 2025-04-10

## 2025-04-10 RX ORDER — TORSEMIDE 10 MG/1
10 TABLET ORAL DAILY
Qty: 90 TABLET | Refills: 1 | Status: SHIPPED | OUTPATIENT
Start: 2025-04-10 | End: 2025-04-10

## 2025-04-10 RX ORDER — MYCOPHENOLIC ACID 180 MG/1
180 TABLET, DELAYED RELEASE ORAL 2 TIMES DAILY
Qty: 60 TABLET | Refills: 11 | Status: SHIPPED | OUTPATIENT
Start: 2025-04-10

## 2025-04-10 RX ORDER — HYDROCHLOROTHIAZIDE 12.5 MG/1
12.5 TABLET ORAL DAILY
Qty: 90 TABLET | Refills: 3 | Status: SHIPPED | OUTPATIENT
Start: 2025-04-10 | End: 2025-07-09

## 2025-04-10 ASSESSMENT — PAIN SCALES - GENERAL: PAINLEVEL_OUTOF10: NO PAIN (0)

## 2025-04-10 NOTE — PROGRESS NOTES
TRANSPLANT NEPHROLOGY CHRONIC POST TRANSPLANT VISIT    Recommend:  Start HCZ 12.5 mg po every day  BMP in 2 weeks  return for RN BP re-check in 1 month    Assessment & Plan   # LDKT: CKD stage 3aA1 Stable   - Baseline Creatinine:  ~ 1.1-1.3   - Proteinuria: Normal (<0.2 grams)   - Date DSA Last Checked: Not Known      Latest DSA: Transplanted at outside Tx Program (Transplanted in [facility])   - BK Viremia: No   - Kidney Tx Biopsy: No    # Immunosuppression: Tacrolimus immediate release (goal 4-6), Mycophenolic acid (dose 540 mg every 12 hours), and Prednisone (dose 5 mg daily)   - Continue with intensive monitoring of immunosuppression for efficacy and toxicity.   - On prednisone due to being transplanted at outside Transplant Program ([facility]) and came back to [place] on it.   - Changes: Not at this time    # Infection Prophylaxis:   - PJP: none CD4>200    # Leukocytosis: resolved on repeat    - denies any fevers. chills    # Hypertension: Inadequate control;  Goal BP: < 130/80   - Changes: Yes - add HCZ 12.5 mg po qd , continue amlodipine 10 mg po qhs    # Diabetes: Borderline control (HbA1c 7-9%) Last HbA1c: 7.5%   - Management as per primary care.    # Mineral Bone Disorder:    - Secondary renal hyperparathyroidism; PTH level: Mildly elevated (151-300 pg/ml)        On treatment: None  - Vitamin D; level: Not checked recently        On supplement: Yes  - Calcium; level: Normal        On supplement: Yes    # Electrolytes:   - Potassium; level: Normal        On supplement: No  - Magnesium; level: Not checked recently        On supplement: Yes  - Bicarbonate; level: Normal        On supplement: No    # Obesity, Class I (BMI = 32.8): Stable weight.   - Recommend weight loss for overall health by increasing exercise and watching caloric intake.    # Possible PAD:  L toe dry gangrene without drainage    hx of right leg wound   Referred to Vascular Surgery. Follows with podiatry     # H/o Latent TB: Patient supposedly completed  treatment with isoniazid, but not completely sure how many months.  Plan was to refer to Transplant ID, but that hasn't happened.   - Will refer to Transplant ID.    # Skin Cancer Risk:    - Discussed sun protection and recommend regular follow up with Dermatology.    # Health Maintenance and Vaccination Review: Recommend:  Flu and COVID    # Transplant History:  Etiology of Kidney Failure: Diabetes mellitus type 2  Tx: LDKT  Transplant: 2/1/2023 (Kidney)  Significant changes in immunosuppression: None  Significant transplant-related complications: None    Transplant Office Phone Number: [phone]    Assessment and plan was discussed with the patient and he voiced his understanding and agreement.    Return visit: Return in about 1 year (around 4/10/2026).    Zaid Deng MD    The longitudinal plan of care for kidney transplant was addressed during this visit. Due to the added complexity in care, I will continue to support Jass Fierro in the subsequent management of this condition(s) and with the ongoing continuity of care of this condition(s).    Chief Complaint   Mr. Fierro is a 74 year old here for kidney transplant and immunosuppression management.    History of Present Illness      Mr. Fierro reports feeling good overall.  Since last clinic visit:   Hospitalizations: No   New Medical Issues: No  Active/Exercise: No  Chest pain or shortness of breath: No  Lower extremity swelling: Yes worse after standing for along times, improves when he sits down. Not taking torsemide,   Weight change: Yes few kg  Nausea and vomiting: yes few days ago, seen in the ED symptoms attributed to constipation, improved with senna/miralax  Diarrhea: No  Heartburn symptoms: No  Fever, sweats or chills: No  Night sweats: No  Urinary complaints: No    Home BP:  140-150s systolic    Problem List   Patient Active Problem List   Diagnosis    Diabetes mellitus, type 2 (H)    Nonspecific reaction to tuberculin skin test  "without active tuberculosis    Anal fissure    HTN, kidney transplant related    TYPE 2 DIABETES, HBA1C GOAL < 7%    CARDIOVASCULAR SCREENING; LDL GOAL LESS THAN 100    Non-proliferative diabetic retinopathy, both eyes (H)    Pseudophakia, right eye    Cataract, left    Kidney replaced by transplant    Aftercare following organ transplant    Chronic kidney disease, stage 2 (mild)    Need for pneumocystis prophylaxis    Immunosuppressed status    Secondary renal hyperparathyroidism    Vitamin D deficiency    Hypomagnesemia    Obesity    Class 2 severe obesity due to excess calories with serious comorbidity in adult (H)       Allergies   Allergies   Allergen Reactions    Gramineae Pollens Itching     \"seasonal\"    Seasonal Allergies Itching       Medications   Current Outpatient Medications   Medication Sig Dispense Refill    acetaminophen (TYLENOL) 500 MG tablet Take 500-1,000 mg by mouth every 8 hours as needed.      amLODIPine (NORVASC) 10 MG tablet Take 1 tablet by mouth daily      ASPIRIN 81 MG OR TABS ONE DAILY 100 3    atorvastatin (LIPITOR) 40 MG tablet Take 1 tablet by mouth daily      blood glucose monitoring (NO BRAND SPECIFIED) meter device kit Use to test blood sugar 3 times daily or as directed. 1 kit 0    Calcium Carb-Cholecalciferol (CALCIUM 500 + D) 500-3.125 MG-MCG TABS Take 1 tablet by mouth daily      clopidogrel (PLAVIX) 300 MG TABS tablet 3 days prior to Angiogram procedure take 300 mg Plavix x 1,  then next day start 75 mg Plavix daily for 3 months 1 tablet 0    clopidogrel (PLAVIX) 75 MG tablet 3 days prior to Angiogram procedure take 300 mg Plavix loading dose, then next day start 75 mg Plavix daily for 3 months. 30 tablet 2    COMPRESSION STOCKINGS Please measure and distribute 2 pair of 20mmHg - 30mmHg knee high open or closed toe compression stockings with extra refills as indicated or what insurance will allow . 2 each 3    COMPRESSION STOCKINGS Please measure and distribute 2 pair of " 20mmHg - 30mmHg knee high open or closed toe compression stockings with extra refills as indicated or what insurance will allow . (Patient not taking: Reported on 9/16/2024) 2 each 4    Continuous Glucose Sensor (FREESTYLE BLAKE 2 SENSOR) MISC       insulin aspart (NOVOLOG FLEXPEN) 100 UNIT/ML pen Inject 13 Units Subcutaneous 3 times daily (with meals)      insulin glargine (LANTUS PEN) 100 UNIT/ML pen Inject 30 Units Subcutaneous at bedtime      Lancets MISC       magnesium oxide (MAG-OX) 400 MG tablet Take 1 tablet (400 mg) by mouth daily Take at 12 PM 90 tablet 3    metoprolol succinate ER (TOPROL XL) 50 MG 24 hr tablet Take 50 mg by mouth daily      mycophenolic acid (GENERIC EQUIVALENT) 180 MG EC tablet Take 1 tablet (180 mg) by mouth 2 times daily Total dose = 540 mg twice per day 60 tablet 11    mycophenolic acid (GENERIC EQUIVALENT) 360 MG EC tablet Take 1 tablet (360 mg) by mouth 2 times daily. Total dose = 540 mg twice per day 180 tablet 3    polyethylene glycol (MIRALAX) 17 GM/Dose powder Take 17 g (1 Capful) by mouth daily. 578 g 0    predniSONE (DELTASONE) 5 MG tablet Take 1 tablet (5 mg) by mouth daily. 90 tablet 3    SENNA-docusate sodium (SENNA S) 8.6-50 MG tablet Take 1 tablet by mouth at bedtime. 30 tablet 0    tacrolimus (GENERIC EQUIVALENT) 0.5 MG capsule Take 1 capsule (0.5 mg) by mouth 2 times daily. Total dose = 1.5 mg every 12 hours 180 capsule 3    tacrolimus (GENERIC) 1 MG capsule Take 1 capsule (1 mg) by mouth 2 times daily Total dose = 1.5 mg every 12 hours 180 capsule 3    tamsulosin (FLOMAX) 0.4 MG capsule Take 0.4 mg by mouth      torsemide (DEMADEX) 10 MG tablet Take 1 tablet (10 mg) by mouth daily 90 tablet 1     No current facility-administered medications for this visit.     There are no discontinued medications.      Physical Exam   Vital Signs: There were no vitals taken for this visit.    GENERAL APPEARANCE: alert and no distress  HENT: mouth without ulcers or lesions  RESP:  lungs clear to auscultation - no rales, rhonchi or wheezes  CV: regular rhythm, normal rate, no rub, no murmur  EDEMA: trace to 1+ LE edema bilaterally  ABDOMEN: soft, nondistended, nontender, bowel sounds normal, obese  MS: extremities normal - no gross deformities noted, no evidence of inflammation in joints, no muscle tenderness  SKIN: no rash  TX KIDNEY: normal  DIALYSIS ACCESS:  RUE AV fistula with good thrill  L toe c/d/i  Data         Latest Ref Rng & Units 3/19/2025    10:55 AM 3/18/2025     1:01 PM 11/15/2024     8:24 AM   Renal   Sodium 135 - 145 mmol/L 134  137  139    K 3.4 - 5.3 mmol/L 5.2  4.3  4.2    Cl 98 - 107 mmol/L 96  101  105    Cl (external) 98 - 107 mmol/L 96  101  105    CO2 22 - 29 mmol/L 22  24  25    Urea Nitrogen 8.0 - 23.0 mg/dL 10.2  16.6  14.8    Creatinine 0.67 - 1.17 mg/dL 0.80  1.00  1.05    Glucose 70 - 99 mg/dL 276  233  148    Calcium 8.8 - 10.4 mg/dL 9.2  9.4  9.2          Latest Ref Rng & Units 2/15/2024     7:36 AM 2/1/2024     8:18 AM 9/22/2023     8:31 AM   Bone Health   Phosphorus 2.5 - 4.5 mg/dL   3.9    Parathyroid Hormone Intact 15 - 65 pg/mL 112  101     Vit D Def 20 - 50 ng/mL 27  26           Latest Ref Rng & Units 3/19/2025    10:55 AM 3/18/2025     1:01 PM 11/15/2024     8:24 AM   Heme   WBC 4.0 - 11.0 10e3/uL 16.1  11.4  10.2    Hgb 13.3 - 17.7 g/dL 14.4  13.7  13.2    Plt 150 - 450 10e3/uL 265  235  203          Latest Ref Rng & Units 3/19/2025    10:55 AM 3/18/2025     1:01 PM 4/15/2024     7:40 AM   Liver   AP 40 - 150 U/L 110  108  79    TBili <=1.2 mg/dL 0.6  0.5  0.6    Bilirubin Direct 0.00 - 0.30 mg/dL   <0.20    ALT 0 - 70 U/L 38  45  24    AST 0 - 45 U/L 39  28  25    Tot Protein 6.4 - 8.3 g/dL 8.0  7.4  6.8    Albumin 3.5 - 5.2 g/dL 4.3  4.2  4.1          Latest Ref Rng & Units 3/19/2025    10:55 AM 3/18/2025     1:01 PM 2/1/2024     8:18 AM   Pancreas   A1C <5.7 %   7.5    Lipase (Roche) 13 - 60 U/L 25  34           Latest Ref Rng & Units 9/9/2021     12:28 PM 8/12/2011     9:49 AM   Iron studies   Iron 35 - 180 ug/dL 37  55    Iron Saturation Index 15 - 46 % 16  21    Ferritin 26 - 388 ng/mL 364  117          Latest Ref Rng & Units 6/20/2023     7:52 AM 8/12/2011     9:49 AM   [facility] Txp Virology   EBV CAPSID ANTIBODY IGG No detectable antibody. Positive     Hep B Surf   327.0        Recent Labs   Lab Test 04/15/24  0740 06/24/24  0634 11/15/24  0823   DOSTAC 4/14/2024 6/23/2024 11/14/2024   TACROL 6.8 6.8 7.9     Recent Labs   Lab Test 06/20/23  0752   DOSMPA 6/19/2023   7:00 PM   MPACID 0.92*   MPAG 32.9

## 2025-04-10 NOTE — PATIENT INSTRUCTIONS
Start HCZ 12.5 mg po every day    Repeat labs in 2 weeks    Labs every 2 months    Monitor blood pressure daily x 2 weeks, goal<130/80.     Transplant Patient Information  Your Post Transplant Coordinator is: Stacie Sanchez  You and your care team can contact your transplant coordinator Monday - Friday, 8am - 5pm at [phone] (Option 2 to reach the coordinator or Option 4 to schedule an appointment).  You can also reach your care team online via [email or web address].  After hours for urgent matters, please call [facility] at [phone].

## 2025-04-10 NOTE — Clinical Note
4/10/2025      Jass Fierro  [address]  [facility] [address]      Dear Colleague,    Thank you for referring your patient, Jass Fierro, to the [facility]. Please see a copy of my visit note below.    TRANSPLANT NEPHROLOGY CHRONIC POST TRANSPLANT VISIT    *** leukocytosis on last check 3/2025  ***IuB9T-6.5% 2/2  Assessment & Plan  # LDKT: CKD stage 3aA1 Stable   - Baseline Creatinine:  ~ 1.1-1.3   - Proteinuria: Normal (<0.2 grams)   - Date DSA Last Checked: Not Known      Latest DSA: Transplanted at outside Tx Program (Transplanted in [facility])   - BK Viremia: No   - Kidney Tx Biopsy: No    # Immunosuppression: Tacrolimus immediate release (goal 4-6), Mycophenolic acid (dose 540 mg every 12 hours), and Prednisone (dose 5 mg daily)   - Continue with intensive monitoring of immunosuppression for efficacy and toxicity.   - On prednisone due to being transplanted at outside Transplant Program ([facility]) and came back to [place] on it.   - Changes: Not at this time    # Infection Prophylaxis:   - PJP: Sulfa/TMP (Bactrim)    # Leukocytosis:   - repeat CBC with diff   - UA Ucx   - f/up podiatry    # Hypertension: Borderline control;  Goal BP: < 130/80   - Changes: Not at this time    # Diabetes: Borderline control (HbA1c 7-9%) Last HbA1c: 7.8%   - Management as per primary care.    # Mineral Bone Disorder:    - Secondary renal hyperparathyroidism; PTH level: Mildly elevated (151-300 pg/ml)        On treatment: None  - Vitamin D; level: Not checked recently        On supplement: Yes  - Calcium; level: Normal        On supplement: Yes    # Electrolytes:   - Potassium; level: Normal        On supplement: No  - Magnesium; level: Not checked recently        On supplement: Yes  - Bicarbonate; level: Normal        On supplement: No    # Obesity, Class I (BMI = 35.51): Stable weight.   - Recommend weight loss for overall health by increasing exercise and watching caloric  intake.    # Possible PAD: Patient with right leg wound and is being worked up for lower extremity vascular disease and was also referred to Vascular Surgery.    # H/o Latent TB: Patient supposedly completed treatment with isoniazid, but not completely sure how many months.  Plan was to refer to Transplant ID, but that hasn't happened.   - Will refer to Transplant ID.    # Skin Cancer Risk:    - Discussed sun protection and recommend regular follow up with Dermatology.    # Health Maintenance and Vaccination Review: Recommend:  Flu and COVID    # Transplant History:  Etiology of Kidney Failure: Diabetes mellitus type 2  Tx: LDKT  Transplant: 2/1/2023 (Kidney)  Significant changes in immunosuppression: None  Significant transplant-related complications: None    Transplant Office Phone Number: [phone]    Assessment and plan was discussed with the patient and he voiced his understanding and agreement.    Return visit: No follow-ups on file.    Zaid Deng MD    The longitudinal plan of care for kidney transplant was addressed during this visit. Due to the added complexity in care, I will continue to support Jass Fierro in the subsequent management of this condition(s) and with the ongoing continuity of care of this condition(s).    Chief Complaint  Mr. Fierro is a 74 year old here for kidney transplant and immunosuppression management.    History of Present Illness     Mr. Fierro reports feeling *** overall.  Since last clinic visit:   Hospitalizations: { :[id number]}   New Medical Issues: { :01133685}  Active/Exercise: { :91949226}  Chest pain or shortness of breath: { :81683596}  Lower extremity swelling: { :59541480}  Weight change: { :01241114}  Nausea and vomiting: { :43969216}  Diarrhea: { :89443578}  Heartburn symptoms: { :11961511}  Fever, sweats or chills: { :44967163}  Night sweats: { :37082700}  Urinary complaints: { :73572020}    Home BP: { :36995589}    Problem List  Patient Active Problem  "List   Diagnosis    Diabetes mellitus, type 2 (H)    Nonspecific reaction to tuberculin skin test without active tuberculosis    Anal fissure    HTN, kidney transplant related    TYPE 2 DIABETES, HBA1C GOAL < 7%    CARDIOVASCULAR SCREENING; LDL GOAL LESS THAN 100    Non-proliferative diabetic retinopathy, both eyes (H)    Pseudophakia, right eye    Cataract, left    Kidney replaced by transplant    Aftercare following organ transplant    Chronic kidney disease, stage 2 (mild)    Need for pneumocystis prophylaxis    Immunosuppressed status    Secondary renal hyperparathyroidism    Vitamin D deficiency    Hypomagnesemia    Obesity    Class 2 severe obesity due to excess calories with serious comorbidity in adult (H)       Allergies  Allergies   Allergen Reactions    Gramineae Pollens Itching     \"seasonal\"    Seasonal Allergies Itching       Medications  Current Outpatient Medications   Medication Sig Dispense Refill    acetaminophen (TYLENOL) 500 MG tablet Take 500-1,000 mg by mouth every 8 hours as needed.      amLODIPine (NORVASC) 10 MG tablet Take 1 tablet by mouth daily      ASPIRIN 81 MG OR TABS ONE DAILY 100 3    atorvastatin (LIPITOR) 40 MG tablet Take 1 tablet by mouth daily      blood glucose monitoring (NO BRAND SPECIFIED) meter device kit Use to test blood sugar 3 times daily or as directed. 1 kit 0    Calcium Carb-Cholecalciferol (CALCIUM 500 + D) 500-3.125 MG-MCG TABS Take 1 tablet by mouth daily      clopidogrel (PLAVIX) 300 MG TABS tablet 3 days prior to Angiogram procedure take 300 mg Plavix x 1,  then next day start 75 mg Plavix daily for 3 months 1 tablet 0    clopidogrel (PLAVIX) 75 MG tablet 3 days prior to Angiogram procedure take 300 mg Plavix loading dose, then next day start 75 mg Plavix daily for 3 months. 30 tablet 2    COMPRESSION STOCKINGS Please measure and distribute 2 pair of 20mmHg - 30mmHg knee high open or closed toe compression stockings with extra refills as indicated or what " insurance will allow . 2 each 3    COMPRESSION STOCKINGS Please measure and distribute 2 pair of 20mmHg - 30mmHg knee high open or closed toe compression stockings with extra refills as indicated or what insurance will allow . (Patient not taking: Reported on 9/16/2024) 2 each 4    Continuous Glucose Sensor (FREESTYLE BLAKE 2 SENSOR) MISC       insulin aspart (NOVOLOG FLEXPEN) 100 UNIT/ML pen Inject 13 Units Subcutaneous 3 times daily (with meals)      insulin glargine (LANTUS PEN) 100 UNIT/ML pen Inject 30 Units Subcutaneous at bedtime      Lancets MISC       magnesium oxide (MAG-OX) 400 MG tablet Take 1 tablet (400 mg) by mouth daily Take at 12 PM 90 tablet 3    metoprolol succinate ER (TOPROL XL) 50 MG 24 hr tablet Take 50 mg by mouth daily      mycophenolic acid (GENERIC EQUIVALENT) 180 MG EC tablet Take 1 tablet (180 mg) by mouth 2 times daily Total dose = 540 mg twice per day 60 tablet 11    mycophenolic acid (GENERIC EQUIVALENT) 360 MG EC tablet Take 1 tablet (360 mg) by mouth 2 times daily. Total dose = 540 mg twice per day 180 tablet 3    polyethylene glycol (MIRALAX) 17 GM/Dose powder Take 17 g (1 Capful) by mouth daily. 578 g 0    predniSONE (DELTASONE) 5 MG tablet Take 1 tablet (5 mg) by mouth daily. 90 tablet 3    SENNA-docusate sodium (SENNA S) 8.6-50 MG tablet Take 1 tablet by mouth at bedtime. 30 tablet 0    tacrolimus (GENERIC EQUIVALENT) 0.5 MG capsule Take 1 capsule (0.5 mg) by mouth 2 times daily. Total dose = 1.5 mg every 12 hours 180 capsule 3    tacrolimus (GENERIC) 1 MG capsule Take 1 capsule (1 mg) by mouth 2 times daily Total dose = 1.5 mg every 12 hours 180 capsule 3    tamsulosin (FLOMAX) 0.4 MG capsule Take 0.4 mg by mouth      torsemide (DEMADEX) 10 MG tablet Take 1 tablet (10 mg) by mouth daily 90 tablet 1     No current facility-administered medications for this visit.     There are no discontinued medications.      Physical Exam  Vital Signs: There were no vitals taken for this  visit.    GENERAL APPEARANCE: alert and no distress  HENT: mouth without ulcers or lesions  RESP: lungs clear to auscultation - no rales, rhonchi or wheezes  CV: regular rhythm, normal rate, no rub, no murmur  EDEMA: trace to 1+ LE edema bilaterally  ABDOMEN: soft, nondistended, nontender, bowel sounds normal, obese  MS: extremities normal - no gross deformities noted, no evidence of inflammation in joints, no muscle tenderness  SKIN: no rash  TX KIDNEY: normal  DIALYSIS ACCESS:  RUE AV fistula with good thrill  L toe c/d/i  Data        Latest Ref Rng & Units 3/19/2025    10:55 AM 3/18/2025     1:01 PM 11/15/2024     8:24 AM   Renal   Sodium 135 - 145 mmol/L 134  137  139    K 3.4 - 5.3 mmol/L 5.2  4.3  4.2    Cl 98 - 107 mmol/L 96  101  105    Cl (external) 98 - 107 mmol/L 96  101  105    CO2 22 - 29 mmol/L 22  24  25    Urea Nitrogen 8.0 - 23.0 mg/dL 10.2  16.6  14.8    Creatinine 0.67 - 1.17 mg/dL 0.80  1.00  1.05    Glucose 70 - 99 mg/dL 276  233  148    Calcium 8.8 - 10.4 mg/dL 9.2  9.4  9.2          Latest Ref Rng & Units 2/15/2024     7:36 AM 2/1/2024     8:18 AM 9/22/2023     8:31 AM   Bone Health   Phosphorus 2.5 - 4.5 mg/dL   3.9    Parathyroid Hormone Intact 15 - 65 pg/mL 112  101     Vit D Def 20 - 50 ng/mL 27  26           Latest Ref Rng & Units 3/19/2025    10:55 AM 3/18/2025     1:01 PM 11/15/2024     8:24 AM   Heme   WBC 4.0 - 11.0 10e3/uL 16.1  11.4  10.2    Hgb 13.3 - 17.7 g/dL 14.4  13.7  13.2    Plt 150 - 450 10e3/uL 265  235  203          Latest Ref Rng & Units 3/19/2025    10:55 AM 3/18/2025     1:01 PM 4/15/2024     7:40 AM   Liver   AP 40 - 150 U/L 110  108  79    TBili <=1.2 mg/dL 0.6  0.5  0.6    Bilirubin Direct 0.00 - 0.30 mg/dL   <0.20    ALT 0 - 70 U/L 38  45  24    AST 0 - 45 U/L 39  28  25    Tot Protein 6.4 - 8.3 g/dL 8.0  7.4  6.8    Albumin 3.5 - 5.2 g/dL 4.3  4.2  4.1          Latest Ref Rng & Units 3/19/2025    10:55 AM 3/18/2025     1:01 PM 2/1/2024     8:18 AM   Pancreas   A1C  <5.7 %   7.5    Lipase (Roche) 13 - 60 U/L 25  34           Latest Ref Rng & Units 9/9/2021    12:28 PM 8/12/2011     9:49 AM   Iron studies   Iron 35 - 180 ug/dL 37  55    Iron Saturation Index 15 - 46 % 16  21    Ferritin 26 - 388 ng/mL 364  117          Latest Ref Rng & Units 6/20/2023     7:52 AM 8/12/2011     9:49 AM   [facility] Txp Virology   EBV CAPSID ANTIBODY IGG No detectable antibody. Positive     Hep B Surf   327.0        Recent Labs   Lab Test 04/15/24  0740 06/24/24  0634 11/15/24  0823   DOSTAC 4/14/2024 6/23/2024 11/14/2024   TACROL 6.8 6.8 7.9     Recent Labs   Lab Test 06/20/23  0752   DOSMPA 6/19/2023   7:00 PM   MPACID 0.92*   MPAG 32.9          TRANSPLANT NEPHROLOGY CHRONIC POST TRANSPLANT VISIT    *** leukocytosis on last check 3/2025  ***RyW2U-3.5% 2/2  Assessment & Plan  # LDKT: CKD stage 3aA1 Stable   - Baseline Creatinine:  ~ 1.1-1.3   - Proteinuria: Normal (<0.2 grams)   - Date DSA Last Checked: Not Known      Latest DSA: Transplanted at outside Tx Program (Transplanted in [facility])   - BK Viremia: No   - Kidney Tx Biopsy: No    # Immunosuppression: Tacrolimus immediate release (goal 4-6), Mycophenolic acid (dose 540 mg every 12 hours), and Prednisone (dose 5 mg daily)   - Continue with intensive monitoring of immunosuppression for efficacy and toxicity.   - On prednisone due to being transplanted at outside Transplant Program ([facility]) and came back to [place] on it.   - Changes: Not at this time    # Infection Prophylaxis:   - PJP: Sulfa/TMP (Bactrim)    # Leukocytosis:   - repeat CBC with diff   - UA Ucx   - f/up podiatry    # Hypertension: Borderline control;  Goal BP: < 130/80   - Changes: Not at this time    # Diabetes: Borderline control (HbA1c 7-9%) Last HbA1c: 7.8%   - Management as per primary care.    # Mineral Bone Disorder:    - Secondary renal hyperparathyroidism; PTH level: Mildly elevated (151-300 pg/ml)        On treatment: None  - Vitamin D; level: Not checked recently        On  supplement: Yes  - Calcium; level: Normal        On supplement: Yes    # Electrolytes:   - Potassium; level: Normal        On supplement: No  - Magnesium; level: Not checked recently        On supplement: Yes  - Bicarbonate; level: Normal        On supplement: No    # Obesity, Class I (BMI = 35.51): Stable weight.   - Recommend weight loss for overall health by increasing exercise and watching caloric intake.    # Possible PAD: Patient with right leg wound and is being worked up for lower extremity vascular disease and was also referred to Vascular Surgery.    # H/o Latent TB: Patient supposedly completed treatment with isoniazid, but not completely sure how many months.  Plan was to refer to Transplant ID, but that hasn't happened.   - Will refer to Transplant ID.    # Skin Cancer Risk:    - Discussed sun protection and recommend regular follow up with Dermatology.    # Health Maintenance and Vaccination Review: Recommend:  Flu and COVID    # Transplant History:  Etiology of Kidney Failure: Diabetes mellitus type 2  Tx: LDKT  Transplant: 2/1/2023 (Kidney)  Significant changes in immunosuppression: None  Significant transplant-related complications: None    Transplant Office Phone Number: [phone]    Assessment and plan was discussed with the patient and he voiced his understanding and agreement.    Return visit: No follow-ups on file.    Zaid Deng MD    The longitudinal plan of care for kidney transplant was addressed during this visit. Due to the added complexity in care, I will continue to support Jass Fierro in the subsequent management of this condition(s) and with the ongoing continuity of care of this condition(s).    Chief Complaint  Mr. Fierro is a 74 year old here for kidney transplant and immunosuppression management.    History of Present Illness     Mr. Fierro reports feeling *** overall.  Since last clinic visit:   Hospitalizations: { :[id number]}   New Medical Issues: {  ":21507921}  Active/Exercise: { :99129279}  Chest pain or shortness of breath: { :46620400}  Lower extremity swelling: Yes  Weight change: { :86043002}  Nausea and vomiting: { :23090603}  Diarrhea: { :94648414}  Heartburn symptoms: { :79464749}  Fever, sweats or chills: { :13786386}  Night sweats: { :19068633}  Urinary complaints: { :68331848}    Home BP: { :72716065}    Problem List  Patient Active Problem List   Diagnosis     Diabetes mellitus, type 2 (H)     Nonspecific reaction to tuberculin skin test without active tuberculosis     Anal fissure     HTN, kidney transplant related     TYPE 2 DIABETES, HBA1C GOAL < 7%     CARDIOVASCULAR SCREENING; LDL GOAL LESS THAN 100     Non-proliferative diabetic retinopathy, both eyes (H)     Pseudophakia, right eye     Cataract, left     Kidney replaced by transplant     Aftercare following organ transplant     Chronic kidney disease, stage 2 (mild)     Need for pneumocystis prophylaxis     Immunosuppressed status     Secondary renal hyperparathyroidism     Vitamin D deficiency     Hypomagnesemia     Obesity     Class 2 severe obesity due to excess calories with serious comorbidity in adult (H)       Allergies  Allergies   Allergen Reactions     Gramineae Pollens Itching     \"seasonal\"     Seasonal Allergies Itching       Medications  Current Outpatient Medications   Medication Sig Dispense Refill     acetaminophen (TYLENOL) 500 MG tablet Take 500-1,000 mg by mouth every 8 hours as needed.       amLODIPine (NORVASC) 10 MG tablet Take 1 tablet by mouth daily       ASPIRIN 81 MG OR TABS ONE DAILY 100 3     atorvastatin (LIPITOR) 40 MG tablet Take 1 tablet by mouth daily       blood glucose monitoring (NO BRAND SPECIFIED) meter device kit Use to test blood sugar 3 times daily or as directed. 1 kit 0     Calcium Carb-Cholecalciferol (CALCIUM 500 + D) 500-3.125 MG-MCG TABS Take 1 tablet by mouth daily       clopidogrel (PLAVIX) 300 MG TABS tablet 3 days prior to Angiogram " procedure take 300 mg Plavix x 1,  then next day start 75 mg Plavix daily for 3 months 1 tablet 0     clopidogrel (PLAVIX) 75 MG tablet 3 days prior to Angiogram procedure take 300 mg Plavix loading dose, then next day start 75 mg Plavix daily for 3 months. 30 tablet 2     COMPRESSION STOCKINGS Please measure and distribute 2 pair of 20mmHg - 30mmHg knee high open or closed toe compression stockings with extra refills as indicated or what insurance will allow . 2 each 3     COMPRESSION STOCKINGS Please measure and distribute 2 pair of 20mmHg - 30mmHg knee high open or closed toe compression stockings with extra refills as indicated or what insurance will allow . (Patient not taking: Reported on 9/16/2024) 2 each 4     Continuous Glucose Sensor (FREESTYLE BLAKE 2 SENSOR) MISC        insulin aspart (NOVOLOG FLEXPEN) 100 UNIT/ML pen Inject 13 Units Subcutaneous 3 times daily (with meals)       insulin glargine (LANTUS PEN) 100 UNIT/ML pen Inject 30 Units Subcutaneous at bedtime       Lancets MISC        magnesium oxide (MAG-OX) 400 MG tablet Take 1 tablet (400 mg) by mouth daily Take at 12 PM 90 tablet 3     metoprolol succinate ER (TOPROL XL) 50 MG 24 hr tablet Take 50 mg by mouth daily       mycophenolic acid (GENERIC EQUIVALENT) 180 MG EC tablet Take 1 tablet (180 mg) by mouth 2 times daily Total dose = 540 mg twice per day 60 tablet 11     mycophenolic acid (GENERIC EQUIVALENT) 360 MG EC tablet Take 1 tablet (360 mg) by mouth 2 times daily. Total dose = 540 mg twice per day 180 tablet 3     polyethylene glycol (MIRALAX) 17 GM/Dose powder Take 17 g (1 Capful) by mouth daily. 578 g 0     predniSONE (DELTASONE) 5 MG tablet Take 1 tablet (5 mg) by mouth daily. 90 tablet 3     SENNA-docusate sodium (SENNA S) 8.6-50 MG tablet Take 1 tablet by mouth at bedtime. 30 tablet 0     tacrolimus (GENERIC EQUIVALENT) 0.5 MG capsule Take 1 capsule (0.5 mg) by mouth 2 times daily. Total dose = 1.5 mg every 12 hours 180 capsule 3      tacrolimus (GENERIC) 1 MG capsule Take 1 capsule (1 mg) by mouth 2 times daily Total dose = 1.5 mg every 12 hours 180 capsule 3     tamsulosin (FLOMAX) 0.4 MG capsule Take 0.4 mg by mouth       torsemide (DEMADEX) 10 MG tablet Take 1 tablet (10 mg) by mouth daily 90 tablet 1     No current facility-administered medications for this visit.     There are no discontinued medications.      Physical Exam  Vital Signs: There were no vitals taken for this visit.    GENERAL APPEARANCE: alert and no distress  HENT: mouth without ulcers or lesions  RESP: lungs clear to auscultation - no rales, rhonchi or wheezes  CV: regular rhythm, normal rate, no rub, no murmur  EDEMA: trace to 1+ LE edema bilaterally  ABDOMEN: soft, nondistended, nontender, bowel sounds normal, obese  MS: extremities normal - no gross deformities noted, no evidence of inflammation in joints, no muscle tenderness  SKIN: no rash  TX KIDNEY: normal  DIALYSIS ACCESS:  RUE AV fistula with good thrill  L toe c/d/i  Data        Latest Ref Rng & Units 3/19/2025    10:55 AM 3/18/2025     1:01 PM 11/15/2024     8:24 AM   Renal   Sodium 135 - 145 mmol/L 134  137  139    K 3.4 - 5.3 mmol/L 5.2  4.3  4.2    Cl 98 - 107 mmol/L 96  101  105    Cl (external) 98 - 107 mmol/L 96  101  105    CO2 22 - 29 mmol/L 22  24  25    Urea Nitrogen 8.0 - 23.0 mg/dL 10.2  16.6  14.8    Creatinine 0.67 - 1.17 mg/dL 0.80  1.00  1.05    Glucose 70 - 99 mg/dL 276  233  148    Calcium 8.8 - 10.4 mg/dL 9.2  9.4  9.2          Latest Ref Rng & Units 2/15/2024     7:36 AM 2/1/2024     8:18 AM 9/22/2023     8:31 AM   Bone Health   Phosphorus 2.5 - 4.5 mg/dL   3.9    Parathyroid Hormone Intact 15 - 65 pg/mL 112  101     Vit D Def 20 - 50 ng/mL 27  26           Latest Ref Rng & Units 3/19/2025    10:55 AM 3/18/2025     1:01 PM 11/15/2024     8:24 AM   Heme   WBC 4.0 - 11.0 10e3/uL 16.1  11.4  10.2    Hgb 13.3 - 17.7 g/dL 14.4  13.7  13.2    Plt 150 - 450 10e3/uL 265  235  203          Latest Ref  Rng & Units 3/19/2025    10:55 AM 3/18/2025     1:01 PM 4/15/2024     7:40 AM   Liver   AP 40 - 150 U/L 110  108  79    TBili <=1.2 mg/dL 0.6  0.5  0.6    Bilirubin Direct 0.00 - 0.30 mg/dL   <0.20    ALT 0 - 70 U/L 38  45  24    AST 0 - 45 U/L 39  28  25    Tot Protein 6.4 - 8.3 g/dL 8.0  7.4  6.8    Albumin 3.5 - 5.2 g/dL 4.3  4.2  4.1          Latest Ref Rng & Units 3/19/2025    10:55 AM 3/18/2025     1:01 PM 2/1/2024     8:18 AM   Pancreas   A1C <5.7 %   7.5    Lipase (Roche) 13 - 60 U/L 25  34           Latest Ref Rng & Units 9/9/2021    12:28 PM 8/12/2011     9:49 AM   Iron studies   Iron 35 - 180 ug/dL 37  55    Iron Saturation Index 15 - 46 % 16  21    Ferritin 26 - 388 ng/mL 364  117          Latest Ref Rng & Units 6/20/2023     7:52 AM 8/12/2011     9:49 AM   [facility] Txp Virology   EBV CAPSID ANTIBODY IGG No detectable antibody. Positive     Hep B Surf   327.0        Recent Labs   Lab Test 04/15/24  0740 06/24/24  0634 11/15/24  0823   DOSTAC 4/14/2024 6/23/2024 11/14/2024   TACROL 6.8 6.8 7.9     Recent Labs   Lab Test 06/20/23  0752   DOSMPA 6/19/2023   7:00 PM   MPACID 0.92*   MPAG 32.9            Again, thank you for allowing me to participate in the care of your patient.        Sincerely,        Zaid Deng MD    Electronically signed

## 2025-05-28 ENCOUNTER — LAB (OUTPATIENT)
Dept: LAB | Facility: CLINIC | Age: 75
End: 2025-05-28
Payer: COMMERCIAL

## 2025-05-28 ENCOUNTER — RESULTS FOLLOW-UP (OUTPATIENT)
Dept: TRANSPLANT | Facility: CLINIC | Age: 75
End: 2025-05-28

## 2025-05-28 DIAGNOSIS — I15.1 HTN, KIDNEY TRANSPLANT RELATED: ICD-10-CM

## 2025-05-28 DIAGNOSIS — Z94.0 KIDNEY REPLACED BY TRANSPLANT: ICD-10-CM

## 2025-05-28 DIAGNOSIS — Z48.298 AFTERCARE FOLLOWING ORGAN TRANSPLANT: ICD-10-CM

## 2025-05-28 DIAGNOSIS — Z94.0 HTN, KIDNEY TRANSPLANT RELATED: ICD-10-CM

## 2025-05-28 LAB
ALBUMIN MFR UR ELPH: 16.3 MG/DL
ANION GAP SERPL CALCULATED.3IONS-SCNC: 11 MMOL/L (ref 7–15)
BUN SERPL-MCNC: 12.4 MG/DL (ref 8–23)
CALCIUM SERPL-MCNC: 9.3 MG/DL (ref 8.8–10.4)
CHLORIDE SERPL-SCNC: 105 MMOL/L (ref 98–107)
CREAT SERPL-MCNC: 0.91 MG/DL (ref 0.67–1.17)
CREAT UR-MCNC: 60.4 MG/DL
EGFRCR SERPLBLD CKD-EPI 2021: 87 ML/MIN/1.73M2
ERYTHROCYTE [DISTWIDTH] IN BLOOD BY AUTOMATED COUNT: 13.9 % (ref 10–15)
GLUCOSE SERPL-MCNC: 143 MG/DL (ref 70–99)
HCO3 SERPL-SCNC: 24 MMOL/L (ref 22–29)
HCT VFR BLD AUTO: 40 % (ref 40–53)
HGB BLD-MCNC: 12.9 G/DL (ref 13.3–17.7)
MCH RBC QN AUTO: 28.2 PG (ref 26.5–33)
MCHC RBC AUTO-ENTMCNC: 32.3 G/DL (ref 31.5–36.5)
MCV RBC AUTO: 87 FL (ref 78–100)
PLATELET # BLD AUTO: 199 10E3/UL (ref 150–450)
POTASSIUM SERPL-SCNC: 4 MMOL/L (ref 3.4–5.3)
PROT/CREAT 24H UR: 0.27 MG/MG CR (ref 0–0.2)
RBC # BLD AUTO: 4.58 10E6/UL (ref 4.4–5.9)
SODIUM SERPL-SCNC: 140 MMOL/L (ref 135–145)
TACROLIMUS BLD-MCNC: 7.3 UG/L (ref 5–15)
TME LAST DOSE: NORMAL H
TME LAST DOSE: NORMAL H
WBC # BLD AUTO: 10.9 10E3/UL (ref 4–11)

## 2025-05-28 PROCEDURE — 80048 BASIC METABOLIC PNL TOTAL CA: CPT | Performed by: PATHOLOGY

## 2025-05-28 PROCEDURE — 36415 COLL VENOUS BLD VENIPUNCTURE: CPT | Performed by: PATHOLOGY

## 2025-05-28 PROCEDURE — 87799 DETECT AGENT NOS DNA QUANT: CPT | Performed by: INTERNAL MEDICINE

## 2025-05-28 PROCEDURE — 84156 ASSAY OF PROTEIN URINE: CPT | Performed by: PATHOLOGY

## 2025-05-28 PROCEDURE — 80197 ASSAY OF TACROLIMUS: CPT | Performed by: INTERNAL MEDICINE

## 2025-05-28 PROCEDURE — 99000 SPECIMEN HANDLING OFFICE-LAB: CPT | Performed by: PATHOLOGY

## 2025-05-28 PROCEDURE — 85027 COMPLETE CBC AUTOMATED: CPT | Performed by: PATHOLOGY

## 2025-05-29 LAB
BK VIRUS SPECIMEN TYPE: NORMAL
BKV DNA # SPEC NAA+PROBE: NOT DETECTED IU/ML

## 2025-08-07 ENCOUNTER — LAB (OUTPATIENT)
Dept: LAB | Facility: CLINIC | Age: 75
End: 2025-08-07
Payer: COMMERCIAL

## 2025-08-07 DIAGNOSIS — Z48.298 AFTERCARE FOLLOWING ORGAN TRANSPLANT: ICD-10-CM

## 2025-08-07 LAB
ANION GAP SERPL CALCULATED.3IONS-SCNC: 12 MMOL/L (ref 7–15)
BK VIRUS SPECIMEN TYPE: NORMAL
BKV DNA # SPEC NAA+PROBE: NOT DETECTED IU/ML
BUN SERPL-MCNC: 10.7 MG/DL (ref 8–23)
CALCIUM SERPL-MCNC: 8.9 MG/DL (ref 8.8–10.4)
CHLORIDE SERPL-SCNC: 107 MMOL/L (ref 98–107)
CREAT SERPL-MCNC: 0.94 MG/DL (ref 0.67–1.17)
EGFRCR SERPLBLD CKD-EPI 2021: 84 ML/MIN/1.73M2
ERYTHROCYTE [DISTWIDTH] IN BLOOD BY AUTOMATED COUNT: 13.9 % (ref 10–15)
GLUCOSE SERPL-MCNC: 153 MG/DL (ref 70–99)
HCO3 SERPL-SCNC: 23 MMOL/L (ref 22–29)
HCT VFR BLD AUTO: 39.8 % (ref 40–53)
HGB BLD-MCNC: 12.7 G/DL (ref 13.3–17.7)
MCH RBC QN AUTO: 27.9 PG (ref 26.5–33)
MCHC RBC AUTO-ENTMCNC: 31.9 G/DL (ref 31.5–36.5)
MCV RBC AUTO: 88 FL (ref 78–100)
PLATELET # BLD AUTO: 213 10E3/UL (ref 150–450)
POTASSIUM SERPL-SCNC: 3.8 MMOL/L (ref 3.4–5.3)
RBC # BLD AUTO: 4.55 10E6/UL (ref 4.4–5.9)
SODIUM SERPL-SCNC: 142 MMOL/L (ref 135–145)
TACROLIMUS BLD-MCNC: 8.6 UG/L (ref 5–15)
TME LAST DOSE: NORMAL H
TME LAST DOSE: NORMAL H
WBC # BLD AUTO: 11.3 10E3/UL (ref 4–11)

## 2025-08-07 PROCEDURE — 99000 SPECIMEN HANDLING OFFICE-LAB: CPT | Performed by: PATHOLOGY

## 2025-08-07 PROCEDURE — 80197 ASSAY OF TACROLIMUS: CPT | Performed by: INTERNAL MEDICINE

## 2025-08-07 PROCEDURE — 87799 DETECT AGENT NOS DNA QUANT: CPT | Performed by: INTERNAL MEDICINE

## 2025-08-08 ENCOUNTER — LAB REQUISITION (OUTPATIENT)
Dept: LAB | Facility: CLINIC | Age: 75
End: 2025-08-08
Payer: COMMERCIAL

## 2025-08-08 DIAGNOSIS — E11.69 TYPE 2 DIABETES MELLITUS WITH OTHER SPECIFIED COMPLICATION (H): ICD-10-CM

## 2025-08-08 DIAGNOSIS — Z79.4 LONG TERM (CURRENT) USE OF INSULIN (H): ICD-10-CM

## 2025-08-08 LAB
PTH-INTACT SERPL-MCNC: 104 PG/ML (ref 15–65)
VIT D+METAB SERPL-MCNC: 27 NG/ML (ref 20–50)

## 2025-08-08 PROCEDURE — 83970 ASSAY OF PARATHORMONE: CPT | Mod: ORL | Performed by: INTERNAL MEDICINE

## 2025-08-08 PROCEDURE — 82306 VITAMIN D 25 HYDROXY: CPT | Mod: ORL | Performed by: INTERNAL MEDICINE

## 2025-08-12 ENCOUNTER — PATIENT OUTREACH (OUTPATIENT)
Dept: CARE COORDINATION | Facility: CLINIC | Age: 75
End: 2025-08-12
Payer: COMMERCIAL

## 2025-08-14 ENCOUNTER — PATIENT OUTREACH (OUTPATIENT)
Dept: CARE COORDINATION | Facility: CLINIC | Age: 75
End: 2025-08-14
Payer: COMMERCIAL

## 2025-08-18 ENCOUNTER — LAB (OUTPATIENT)
Dept: LAB | Facility: CLINIC | Age: 75
End: 2025-08-18
Payer: COMMERCIAL

## 2025-08-18 DIAGNOSIS — Z79.899 ENCOUNTER FOR LONG-TERM CURRENT USE OF MEDICATION: ICD-10-CM

## 2025-08-18 DIAGNOSIS — Z94.0 KIDNEY REPLACED BY TRANSPLANT: ICD-10-CM

## 2025-08-18 LAB
ANION GAP SERPL CALCULATED.3IONS-SCNC: 13 MMOL/L (ref 7–15)
BUN SERPL-MCNC: 11.1 MG/DL (ref 8–23)
CALCIUM SERPL-MCNC: 9.2 MG/DL (ref 8.8–10.4)
CHLORIDE SERPL-SCNC: 103 MMOL/L (ref 98–107)
CREAT SERPL-MCNC: 0.92 MG/DL (ref 0.67–1.17)
EGFRCR SERPLBLD CKD-EPI 2021: 86 ML/MIN/1.73M2
GLUCOSE SERPL-MCNC: 160 MG/DL (ref 70–99)
HCO3 SERPL-SCNC: 24 MMOL/L (ref 22–29)
POTASSIUM SERPL-SCNC: 4.1 MMOL/L (ref 3.4–5.3)
SODIUM SERPL-SCNC: 140 MMOL/L (ref 135–145)
TACROLIMUS BLD-MCNC: 5.1 UG/L (ref 5–15)
TME LAST DOSE: NORMAL H
TME LAST DOSE: NORMAL H

## 2025-08-18 PROCEDURE — 80048 BASIC METABOLIC PNL TOTAL CA: CPT | Performed by: PATHOLOGY

## 2025-08-18 PROCEDURE — 80197 ASSAY OF TACROLIMUS: CPT | Performed by: INTERNAL MEDICINE

## 2025-08-18 PROCEDURE — 99000 SPECIMEN HANDLING OFFICE-LAB: CPT | Performed by: PATHOLOGY

## 2025-08-18 PROCEDURE — 36415 COLL VENOUS BLD VENIPUNCTURE: CPT | Performed by: PATHOLOGY

## 2025-09-04 ENCOUNTER — HOSPITAL ENCOUNTER (INPATIENT)
Facility: CLINIC | Age: 75
End: 2025-09-04
Attending: EMERGENCY MEDICINE | Admitting: INTERNAL MEDICINE
Payer: COMMERCIAL

## 2025-09-04 VITALS
WEIGHT: 203.93 LBS | TEMPERATURE: 98.8 F | HEIGHT: 69 IN | BODY MASS INDEX: 30.2 KG/M2 | DIASTOLIC BLOOD PRESSURE: 74 MMHG | RESPIRATION RATE: 16 BRPM | OXYGEN SATURATION: 96 % | HEART RATE: 69 BPM | SYSTOLIC BLOOD PRESSURE: 151 MMHG

## 2025-09-04 DIAGNOSIS — D84.9 IMMUNOSUPPRESSED STATUS: ICD-10-CM

## 2025-09-04 DIAGNOSIS — E11.628 TYPE 2 DIABETES MELLITUS WITH OTHER SKIN COMPLICATION, WITH LONG-TERM CURRENT USE OF INSULIN (H): ICD-10-CM

## 2025-09-04 DIAGNOSIS — Z79.4 TYPE 2 DIABETES MELLITUS WITH OTHER SKIN COMPLICATION, WITH LONG-TERM CURRENT USE OF INSULIN (H): ICD-10-CM

## 2025-09-04 DIAGNOSIS — I73.9 PAD (PERIPHERAL ARTERY DISEASE): ICD-10-CM

## 2025-09-04 DIAGNOSIS — Z94.0 STATUS POST KIDNEY TRANSPLANT: ICD-10-CM

## 2025-09-04 DIAGNOSIS — I96 GANGRENOUS TOE (H): Primary | ICD-10-CM

## 2025-09-04 LAB
ALBUMIN SERPL BCG-MCNC: 4 G/DL (ref 3.5–5.2)
ALBUMIN UR-MCNC: NEGATIVE MG/DL
ALP SERPL-CCNC: 116 U/L (ref 40–150)
ALT SERPL W P-5'-P-CCNC: 23 U/L (ref 0–70)
ANION GAP SERPL CALCULATED.3IONS-SCNC: 12 MMOL/L (ref 7–15)
APPEARANCE UR: CLEAR
AST SERPL W P-5'-P-CCNC: 22 U/L (ref 0–45)
BASOPHILS # BLD AUTO: 0.03 10E3/UL (ref 0–0.2)
BASOPHILS NFR BLD AUTO: 0.3 %
BILIRUB SERPL-MCNC: 0.5 MG/DL
BILIRUB UR QL STRIP: NEGATIVE
BUN SERPL-MCNC: 11.4 MG/DL (ref 8–23)
CALCIUM SERPL-MCNC: 9.1 MG/DL (ref 8.8–10.4)
CHLORIDE SERPL-SCNC: 103 MMOL/L (ref 98–107)
COLOR UR AUTO: ABNORMAL
CREAT SERPL-MCNC: 0.94 MG/DL (ref 0.67–1.17)
CRP SERPL-MCNC: 15.68 MG/L
EGFRCR SERPLBLD CKD-EPI 2021: 84 ML/MIN/1.73M2
EOSINOPHIL # BLD AUTO: 0.1 10E3/UL (ref 0–0.7)
EOSINOPHIL NFR BLD AUTO: 1.1 %
ERYTHROCYTE [DISTWIDTH] IN BLOOD BY AUTOMATED COUNT: 14.1 % (ref 10–15)
ERYTHROCYTE [SEDIMENTATION RATE] IN BLOOD BY WESTERGREN METHOD: 25 MM/HR (ref 0–20)
EST. AVERAGE GLUCOSE BLD GHB EST-MCNC: 174 MG/DL
GLUCOSE BLDC GLUCOMTR-MCNC: 100 MG/DL (ref 70–99)
GLUCOSE BLDC GLUCOMTR-MCNC: 227 MG/DL (ref 70–99)
GLUCOSE BLDC GLUCOMTR-MCNC: 242 MG/DL (ref 70–99)
GLUCOSE SERPL-MCNC: 146 MG/DL (ref 70–99)
GLUCOSE UR STRIP-MCNC: NEGATIVE MG/DL
HBA1C MFR BLD: 7.7 %
HCO3 SERPL-SCNC: 24 MMOL/L (ref 22–29)
HCT VFR BLD AUTO: 39.6 % (ref 40–53)
HGB BLD-MCNC: 13.2 G/DL (ref 13.3–17.7)
HGB UR QL STRIP: NEGATIVE
IMM GRANULOCYTES # BLD: 0.03 10E3/UL
IMM GRANULOCYTES NFR BLD: 0.3 %
KETONES UR STRIP-MCNC: NEGATIVE MG/DL
LEUKOCYTE ESTERASE UR QL STRIP: NEGATIVE
LYMPHOCYTES # BLD AUTO: 1.39 10E3/UL (ref 0.8–5.3)
LYMPHOCYTES NFR BLD AUTO: 14.7 %
MCH RBC QN AUTO: 29 PG (ref 26.5–33)
MCHC RBC AUTO-ENTMCNC: 33.3 G/DL (ref 31.5–36.5)
MCV RBC AUTO: 87 FL (ref 78–100)
MONOCYTES # BLD AUTO: 0.7 10E3/UL (ref 0–1.3)
MONOCYTES NFR BLD AUTO: 7.4 %
MRSA DNA SPEC QL NAA+PROBE: NEGATIVE
NEUTROPHILS # BLD AUTO: 7.18 10E3/UL (ref 1.6–8.3)
NEUTROPHILS NFR BLD AUTO: 76.2 %
NITRATE UR QL: NEGATIVE
NRBC # BLD AUTO: <0.03 10E3/UL
NRBC BLD AUTO-RTO: 0 /100
PH UR STRIP: 8 [PH] (ref 5–7)
PLATELET # BLD AUTO: 222 10E3/UL (ref 150–450)
POTASSIUM SERPL-SCNC: 4.4 MMOL/L (ref 3.4–5.3)
PROT SERPL-MCNC: 7 G/DL (ref 6.4–8.3)
RBC # BLD AUTO: 4.55 10E6/UL (ref 4.4–5.9)
RBC URINE: <1 /HPF
SA TARGET DNA: NEGATIVE
SODIUM SERPL-SCNC: 139 MMOL/L (ref 135–145)
SP GR UR STRIP: 1.02 (ref 1–1.03)
UROBILINOGEN UR STRIP-MCNC: NORMAL MG/DL
WBC # BLD AUTO: 9.43 10E3/UL (ref 4–11)
WBC URINE: <1 /HPF

## 2025-09-04 PROCEDURE — 36415 COLL VENOUS BLD VENIPUNCTURE: CPT | Performed by: PHYSICIAN ASSISTANT

## 2025-09-04 PROCEDURE — 81001 URINALYSIS AUTO W/SCOPE: CPT | Performed by: PHYSICIAN ASSISTANT

## 2025-09-04 PROCEDURE — 250N000011 HC RX IP 250 OP 636

## 2025-09-04 PROCEDURE — 250N000013 HC RX MED GY IP 250 OP 250 PS 637

## 2025-09-04 PROCEDURE — 250N000012 HC RX MED GY IP 250 OP 636 PS 637

## 2025-09-04 PROCEDURE — 250N000011 HC RX IP 250 OP 636: Performed by: PHYSICIAN ASSISTANT

## 2025-09-04 PROCEDURE — 999N000040 HC STATISTIC CONSULT NO CHARGE VASC ACCESS

## 2025-09-04 PROCEDURE — 250N000011 HC RX IP 250 OP 636: Performed by: EMERGENCY MEDICINE

## 2025-09-04 PROCEDURE — 999N000127 HC STATISTIC PERIPHERAL IV START W US GUIDANCE

## 2025-09-04 PROCEDURE — 85652 RBC SED RATE AUTOMATED: CPT | Performed by: PHYSICIAN ASSISTANT

## 2025-09-04 PROCEDURE — 83036 HEMOGLOBIN GLYCOSYLATED A1C: CPT

## 2025-09-04 PROCEDURE — 85048 AUTOMATED LEUKOCYTE COUNT: CPT | Performed by: PHYSICIAN ASSISTANT

## 2025-09-04 PROCEDURE — 36415 COLL VENOUS BLD VENIPUNCTURE: CPT

## 2025-09-04 PROCEDURE — 255N000002 HC RX 255 OP 636: Performed by: PHYSICIAN ASSISTANT

## 2025-09-04 PROCEDURE — 82310 ASSAY OF CALCIUM: CPT | Performed by: PHYSICIAN ASSISTANT

## 2025-09-04 PROCEDURE — 87640 STAPH A DNA AMP PROBE: CPT

## 2025-09-04 PROCEDURE — 87040 BLOOD CULTURE FOR BACTERIA: CPT | Performed by: PHYSICIAN ASSISTANT

## 2025-09-04 PROCEDURE — 86140 C-REACTIVE PROTEIN: CPT | Performed by: PHYSICIAN ASSISTANT

## 2025-09-04 PROCEDURE — 82962 GLUCOSE BLOOD TEST: CPT

## 2025-09-04 PROCEDURE — 250N000009 HC RX 250: Performed by: PHYSICIAN ASSISTANT

## 2025-09-04 PROCEDURE — 120N000002 HC R&B MED SURG/OB UMMC

## 2025-09-04 RX ORDER — PIPERACILLIN SODIUM, TAZOBACTAM SODIUM 4; .5 G/20ML; G/20ML
4.5 INJECTION, POWDER, LYOPHILIZED, FOR SOLUTION INTRAVENOUS ONCE
Status: COMPLETED | OUTPATIENT
Start: 2025-09-04 | End: 2025-09-04

## 2025-09-04 RX ORDER — ONDANSETRON 4 MG/1
4 TABLET, ORALLY DISINTEGRATING ORAL EVERY 6 HOURS PRN
Status: DISPENSED | OUTPATIENT
Start: 2025-09-04

## 2025-09-04 RX ORDER — AMLODIPINE BESYLATE 5 MG/1
10 TABLET ORAL DAILY
Status: CANCELLED | OUTPATIENT
Start: 2025-09-05

## 2025-09-04 RX ORDER — ACETAMINOPHEN 650 MG/1
650 SUPPOSITORY RECTAL EVERY 4 HOURS PRN
Status: ACTIVE | OUTPATIENT
Start: 2025-09-04

## 2025-09-04 RX ORDER — PREDNISONE 5 MG/1
5 TABLET ORAL DAILY
Status: CANCELLED | OUTPATIENT
Start: 2025-09-04

## 2025-09-04 RX ORDER — ATORVASTATIN CALCIUM 40 MG/1
40 TABLET, FILM COATED ORAL DAILY
Status: ACTIVE | OUTPATIENT
Start: 2025-09-05

## 2025-09-04 RX ORDER — ACETAMINOPHEN 325 MG/1
650 TABLET ORAL EVERY 4 HOURS PRN
Status: DISPENSED | OUTPATIENT
Start: 2025-09-04

## 2025-09-04 RX ORDER — LIDOCAINE 40 MG/G
CREAM TOPICAL
Status: ACTIVE | OUTPATIENT
Start: 2025-09-04

## 2025-09-04 RX ORDER — AMOXICILLIN 250 MG
1 CAPSULE ORAL 2 TIMES DAILY PRN
Status: ACTIVE | OUTPATIENT
Start: 2025-09-04

## 2025-09-04 RX ORDER — MAGNESIUM OXIDE 400 MG/1
400 TABLET ORAL
Status: CANCELLED | OUTPATIENT
Start: 2025-09-05

## 2025-09-04 RX ORDER — DEXTROSE MONOHYDRATE 25 G/50ML
25-50 INJECTION, SOLUTION INTRAVENOUS
Status: ACTIVE | OUTPATIENT
Start: 2025-09-04

## 2025-09-04 RX ORDER — PIPERACILLIN SODIUM, TAZOBACTAM SODIUM 3; .375 G/15ML; G/15ML
3.38 INJECTION, POWDER, LYOPHILIZED, FOR SOLUTION INTRAVENOUS EVERY 6 HOURS
Status: DISPENSED | OUTPATIENT
Start: 2025-09-04

## 2025-09-04 RX ORDER — ATORVASTATIN CALCIUM 40 MG/1
40 TABLET, FILM COATED ORAL EVERY EVENING
Status: CANCELLED | OUTPATIENT
Start: 2025-09-04

## 2025-09-04 RX ORDER — NICOTINE POLACRILEX 4 MG
15-30 LOZENGE BUCCAL
Status: ACTIVE | OUTPATIENT
Start: 2025-09-04

## 2025-09-04 RX ORDER — TAMSULOSIN HYDROCHLORIDE 0.4 MG/1
0.4 CAPSULE ORAL EVERY EVENING
Status: CANCELLED | OUTPATIENT
Start: 2025-09-04

## 2025-09-04 RX ORDER — PREDNISONE 5 MG/1
5 TABLET ORAL DAILY
Status: ACTIVE | OUTPATIENT
Start: 2025-09-05

## 2025-09-04 RX ORDER — CALCIUM CARBONATE 500 MG/1
1000 TABLET, CHEWABLE ORAL 4 TIMES DAILY PRN
Status: ACTIVE | OUTPATIENT
Start: 2025-09-04

## 2025-09-04 RX ORDER — ONDANSETRON 2 MG/ML
4 INJECTION INTRAMUSCULAR; INTRAVENOUS EVERY 6 HOURS PRN
Status: DISPENSED | OUTPATIENT
Start: 2025-09-04

## 2025-09-04 RX ORDER — HYDROCHLOROTHIAZIDE 12.5 MG/1
12.5 TABLET ORAL DAILY
Status: CANCELLED | OUTPATIENT
Start: 2025-09-05

## 2025-09-04 RX ORDER — SENNA AND DOCUSATE SODIUM 50; 8.6 MG/1; MG/1
1 TABLET, FILM COATED ORAL AT BEDTIME
Status: CANCELLED | OUTPATIENT
Start: 2025-09-04

## 2025-09-04 RX ORDER — TAMSULOSIN HYDROCHLORIDE 0.4 MG/1
0.4 CAPSULE ORAL EVERY EVENING
Status: DISPENSED | OUTPATIENT
Start: 2025-09-04

## 2025-09-04 RX ORDER — POLYETHYLENE GLYCOL 3350 17 G/17G
17 POWDER, FOR SOLUTION ORAL DAILY
Status: CANCELLED | OUTPATIENT
Start: 2025-09-04

## 2025-09-04 RX ORDER — ASPIRIN 81 MG/1
81 TABLET ORAL DAILY
Status: ON HOLD | COMMUNITY

## 2025-09-04 RX ORDER — AMLODIPINE BESYLATE 10 MG/1
10 TABLET ORAL DAILY
Status: DISPENSED | OUTPATIENT
Start: 2025-09-04

## 2025-09-04 RX ORDER — ASPIRIN 81 MG/1
81 TABLET ORAL DAILY
Status: ACTIVE | OUTPATIENT
Start: 2025-09-05

## 2025-09-04 RX ORDER — TACROLIMUS 1 MG/1
1 CAPSULE ORAL 2 TIMES DAILY
Status: CANCELLED | OUTPATIENT
Start: 2025-09-04

## 2025-09-04 RX ORDER — TACROLIMUS 1 MG/1
1 CAPSULE ORAL 2 TIMES DAILY
Status: DISPENSED | OUTPATIENT
Start: 2025-09-04

## 2025-09-04 RX ORDER — METOPROLOL SUCCINATE 50 MG/1
50 TABLET, EXTENDED RELEASE ORAL DAILY
Status: ACTIVE | OUTPATIENT
Start: 2025-09-05

## 2025-09-04 RX ORDER — AMOXICILLIN 250 MG
2 CAPSULE ORAL 2 TIMES DAILY PRN
Status: ACTIVE | OUTPATIENT
Start: 2025-09-04

## 2025-09-04 RX ADMIN — PIPERACILLIN AND TAZOBACTAM 4.5 G: 4; .5 INJECTION, POWDER, FOR SOLUTION INTRAVENOUS at 14:23

## 2025-09-04 RX ADMIN — Medication 1000 MG: at 16:32

## 2025-09-04 RX ADMIN — SODIUM CHLORIDE 60 ML: 9 INJECTION, SOLUTION INTRAVENOUS at 12:11

## 2025-09-04 RX ADMIN — TAMSULOSIN HYDROCHLORIDE 0.4 MG: 0.4 CAPSULE ORAL at 20:29

## 2025-09-04 RX ADMIN — INSULIN GLARGINE 17 UNITS: 100 INJECTION, SOLUTION SUBCUTANEOUS at 23:31

## 2025-09-04 RX ADMIN — PIPERACILLIN AND TAZOBACTAM 3.38 G: 3; .375 INJECTION, POWDER, LYOPHILIZED, FOR SOLUTION INTRAVENOUS at 20:32

## 2025-09-04 RX ADMIN — MYCOPHENOLIC ACID 540 MG: 360 TABLET, DELAYED RELEASE ORAL at 20:29

## 2025-09-04 RX ADMIN — TACROLIMUS 1 MG: 1 CAPSULE ORAL at 20:29

## 2025-09-04 RX ADMIN — AMLODIPINE BESYLATE 10 MG: 5 TABLET ORAL at 20:29

## 2025-09-04 RX ADMIN — PIPERACILLIN AND TAZOBACTAM 4.5 G: 4; .5 INJECTION, POWDER, FOR SOLUTION INTRAVENOUS at 14:29

## 2025-09-04 RX ADMIN — IOHEXOL 94 ML: 350 INJECTION, SOLUTION INTRAVENOUS at 12:10

## 2025-09-04 ASSESSMENT — ACTIVITIES OF DAILY LIVING (ADL)
ADLS_ACUITY_SCORE: 41
ADLS_ACUITY_SCORE: 36
ADLS_ACUITY_SCORE: 41

## 2025-09-04 ASSESSMENT — COLUMBIA-SUICIDE SEVERITY RATING SCALE - C-SSRS
1. IN THE PAST MONTH, HAVE YOU WISHED YOU WERE DEAD OR WISHED YOU COULD GO TO SLEEP AND NOT WAKE UP?: NO
6. HAVE YOU EVER DONE ANYTHING, STARTED TO DO ANYTHING, OR PREPARED TO DO ANYTHING TO END YOUR LIFE?: NO
2. HAVE YOU ACTUALLY HAD ANY THOUGHTS OF KILLING YOURSELF IN THE PAST MONTH?: NO

## (undated) DEVICE — PACK CATARACT CUSTOM ASC SEY15CPUMC

## (undated) DEVICE — EYE SHIELD PLASTIC

## (undated) DEVICE — EYE CANN IRR 25GA CYSTOTOME 581610

## (undated) DEVICE — EYE KNIFE SLIT XSTAR VISITEC 2.6MM 45DEG 373726

## (undated) DEVICE — LINEN TOWEL PACK X5 5464

## (undated) DEVICE — EYE PACK CUSTOM ANTERIOR 30DEG TIP CENTURION PPK6682-04

## (undated) DEVICE — GLOVE PROTEXIS MICRO 6.5  2D73PM65

## (undated) DEVICE — EYE KNIFE STILETTO VISITEC 1.1MM ANG 45DEG SIDEPORT 376620

## (undated) DEVICE — EYE CANN IRR 27GA ANTERIOR CHAMBER 581280

## (undated) DEVICE — EYE TIP IRRIGATION & ASPIRATION POLYMER CVD 0.3MM 8065751512

## (undated) RX ORDER — ACETAMINOPHEN 325 MG/1
TABLET ORAL
Status: DISPENSED
Start: 2019-03-01

## (undated) RX ORDER — BEVACIZUMAB 2.5 MG/0.1
SYRINGE (ML) INTRAOCULAR
Status: DISPENSED
Start: 2019-03-01